# Patient Record
Sex: FEMALE | Race: WHITE | NOT HISPANIC OR LATINO | Employment: PART TIME | URBAN - METROPOLITAN AREA
[De-identification: names, ages, dates, MRNs, and addresses within clinical notes are randomized per-mention and may not be internally consistent; named-entity substitution may affect disease eponyms.]

---

## 2017-03-27 ENCOUNTER — TRANSFERRED RECORDS (OUTPATIENT)
Dept: HEALTH INFORMATION MANAGEMENT | Facility: CLINIC | Age: 27
End: 2017-03-27

## 2017-04-07 ENCOUNTER — TRANSFERRED RECORDS (OUTPATIENT)
Dept: HEALTH INFORMATION MANAGEMENT | Facility: CLINIC | Age: 27
End: 2017-04-07

## 2017-04-13 ENCOUNTER — TRANSFERRED RECORDS (OUTPATIENT)
Dept: HEALTH INFORMATION MANAGEMENT | Facility: CLINIC | Age: 27
End: 2017-04-13

## 2017-08-23 ENCOUNTER — TRANSFERRED RECORDS (OUTPATIENT)
Dept: HEALTH INFORMATION MANAGEMENT | Facility: CLINIC | Age: 27
End: 2017-08-23

## 2017-09-28 ENCOUNTER — RECORDS - HEALTHEAST (OUTPATIENT)
Dept: ADMINISTRATIVE | Facility: OTHER | Age: 27
End: 2017-09-28

## 2017-09-28 LAB

## 2018-01-09 ENCOUNTER — RECORDS - HEALTHEAST (OUTPATIENT)
Dept: LAB | Facility: CLINIC | Age: 28
End: 2018-01-09

## 2018-01-09 LAB — HIV 1+2 AB+HIV1 P24 AG SERPL QL IA: NEGATIVE

## 2018-01-10 LAB
C TRACH DNA SPEC QL PROBE+SIG AMP: NEGATIVE
N GONORRHOEA DNA SPEC QL NAA+PROBE: NEGATIVE
SYPHILIS RPR SCREEN - HISTORICAL: NORMAL

## 2018-02-07 ENCOUNTER — TRANSFERRED RECORDS (OUTPATIENT)
Dept: HEALTH INFORMATION MANAGEMENT | Facility: CLINIC | Age: 28
End: 2018-02-07

## 2019-04-24 ENCOUNTER — RECORDS - HEALTHEAST (OUTPATIENT)
Dept: LAB | Facility: HOSPITAL | Age: 29
End: 2019-04-24

## 2019-04-24 LAB
ALBUMIN SERPL-MCNC: 3.7 G/DL (ref 3.5–5)
ALP SERPL-CCNC: 61 U/L (ref 45–120)
ALT SERPL W P-5'-P-CCNC: 16 U/L (ref 0–45)
ANION GAP SERPL CALCULATED.3IONS-SCNC: 6 MMOL/L (ref 5–18)
AST SERPL W P-5'-P-CCNC: 15 U/L (ref 0–40)
BASOPHILS # BLD AUTO: 0 THOU/UL (ref 0–0.2)
BASOPHILS NFR BLD AUTO: 1 % (ref 0–2)
BILIRUB SERPL-MCNC: 0.8 MG/DL (ref 0–1)
BUN SERPL-MCNC: 10 MG/DL (ref 8–22)
CALCIUM SERPL-MCNC: 9.4 MG/DL (ref 8.5–10.5)
CHLORIDE BLD-SCNC: 105 MMOL/L (ref 98–107)
CO2 SERPL-SCNC: 29 MMOL/L (ref 22–31)
CREAT SERPL-MCNC: 0.71 MG/DL (ref 0.6–1.1)
EOSINOPHIL # BLD AUTO: 0.1 THOU/UL (ref 0–0.4)
EOSINOPHIL NFR BLD AUTO: 3 % (ref 0–6)
ERYTHROCYTE [DISTWIDTH] IN BLOOD BY AUTOMATED COUNT: 12.2 % (ref 11–14.5)
GFR SERPL CREATININE-BSD FRML MDRD: >60 ML/MIN/1.73M2
GLUCOSE BLD-MCNC: 84 MG/DL (ref 70–125)
HCT VFR BLD AUTO: 44.3 % (ref 35–47)
HGB BLD-MCNC: 14.9 G/DL (ref 12–16)
LYMPHOCYTES # BLD AUTO: 1.5 THOU/UL (ref 0.8–4.4)
LYMPHOCYTES NFR BLD AUTO: 41 % (ref 20–40)
MCH RBC QN AUTO: 31.7 PG (ref 27–34)
MCHC RBC AUTO-ENTMCNC: 33.6 G/DL (ref 32–36)
MCV RBC AUTO: 94 FL (ref 80–100)
MONOCYTES # BLD AUTO: 0.2 THOU/UL (ref 0–0.9)
MONOCYTES NFR BLD AUTO: 6 % (ref 2–10)
NEUTROPHILS # BLD AUTO: 1.8 THOU/UL (ref 2–7.7)
NEUTROPHILS NFR BLD AUTO: 50 % (ref 50–70)
PLATELET # BLD AUTO: 238 THOU/UL (ref 140–440)
PMV BLD AUTO: 11.9 FL (ref 8.5–12.5)
POTASSIUM BLD-SCNC: 4.7 MMOL/L (ref 3.5–5)
PROT SERPL-MCNC: 6.9 G/DL (ref 6–8)
RBC # BLD AUTO: 4.7 MILL/UL (ref 3.8–5.4)
SODIUM SERPL-SCNC: 140 MMOL/L (ref 136–145)
WBC: 3.7 THOU/UL (ref 4–11)

## 2019-04-25 LAB — 25(OH)D3 SERPL-MCNC: 57.7 NG/ML (ref 30–80)

## 2019-05-06 ENCOUNTER — TRANSFERRED RECORDS (OUTPATIENT)
Dept: HEALTH INFORMATION MANAGEMENT | Facility: CLINIC | Age: 29
End: 2019-05-06

## 2019-06-12 ENCOUNTER — TRANSFERRED RECORDS (OUTPATIENT)
Dept: HEALTH INFORMATION MANAGEMENT | Facility: CLINIC | Age: 29
End: 2019-06-12

## 2019-06-13 ENCOUNTER — TRANSFERRED RECORDS (OUTPATIENT)
Dept: HEALTH INFORMATION MANAGEMENT | Facility: CLINIC | Age: 29
End: 2019-06-13

## 2019-07-24 ENCOUNTER — RECORDS - HEALTHEAST (OUTPATIENT)
Dept: LAB | Facility: HOSPITAL | Age: 29
End: 2019-07-24

## 2019-07-24 ENCOUNTER — TRANSFERRED RECORDS (OUTPATIENT)
Dept: HEALTH INFORMATION MANAGEMENT | Facility: CLINIC | Age: 29
End: 2019-07-24

## 2019-07-24 LAB
ALBUMIN SERPL-MCNC: 3.9 G/DL (ref 3.5–5)
ALP SERPL-CCNC: 63 U/L (ref 45–120)
ALT SERPL W P-5'-P-CCNC: 13 U/L (ref 0–45)
ANION GAP SERPL CALCULATED.3IONS-SCNC: 6 MMOL/L (ref 5–18)
AST SERPL W P-5'-P-CCNC: 13 U/L (ref 0–40)
BASOPHILS # BLD AUTO: 0 THOU/UL (ref 0–0.2)
BASOPHILS NFR BLD AUTO: 0 % (ref 0–2)
BILIRUB SERPL-MCNC: 0.6 MG/DL (ref 0–1)
BUN SERPL-MCNC: 15 MG/DL (ref 8–22)
CALCIUM SERPL-MCNC: 9.3 MG/DL (ref 8.5–10.5)
CHLORIDE BLD-SCNC: 106 MMOL/L (ref 98–107)
CO2 SERPL-SCNC: 28 MMOL/L (ref 22–31)
CREAT SERPL-MCNC: 0.69 MG/DL (ref 0.6–1.1)
EOSINOPHIL # BLD AUTO: 0.1 THOU/UL (ref 0–0.4)
EOSINOPHIL NFR BLD AUTO: 2 % (ref 0–6)
ERYTHROCYTE [DISTWIDTH] IN BLOOD BY AUTOMATED COUNT: 12.7 % (ref 11–14.5)
GFR SERPL CREATININE-BSD FRML MDRD: >60 ML/MIN/1.73M2
GLUCOSE BLD-MCNC: 85 MG/DL (ref 70–125)
HCT VFR BLD AUTO: 42.8 % (ref 35–47)
HGB BLD-MCNC: 14.5 G/DL (ref 12–16)
LYMPHOCYTES # BLD AUTO: 1.4 THOU/UL (ref 0.8–4.4)
LYMPHOCYTES NFR BLD AUTO: 21 % (ref 20–40)
MCH RBC QN AUTO: 31.8 PG (ref 27–34)
MCHC RBC AUTO-ENTMCNC: 33.9 G/DL (ref 32–36)
MCV RBC AUTO: 94 FL (ref 80–100)
MONOCYTES # BLD AUTO: 0.4 THOU/UL (ref 0–0.9)
MONOCYTES NFR BLD AUTO: 6 % (ref 2–10)
NEUTROPHILS # BLD AUTO: 4.8 THOU/UL (ref 2–7.7)
NEUTROPHILS NFR BLD AUTO: 71 % (ref 50–70)
PLATELET # BLD AUTO: 225 THOU/UL (ref 140–440)
PMV BLD AUTO: 11.5 FL (ref 8.5–12.5)
POTASSIUM BLD-SCNC: 4.5 MMOL/L (ref 3.5–5)
PROT SERPL-MCNC: 6.7 G/DL (ref 6–8)
RBC # BLD AUTO: 4.56 MILL/UL (ref 3.8–5.4)
SODIUM SERPL-SCNC: 140 MMOL/L (ref 136–145)
WBC: 6.8 THOU/UL (ref 4–11)

## 2019-08-19 ENCOUNTER — RECORDS - HEALTHEAST (OUTPATIENT)
Dept: LAB | Facility: CLINIC | Age: 29
End: 2019-08-19

## 2019-08-19 LAB — HIV 1+2 AB+HIV1 P24 AG SERPL QL IA: NEGATIVE

## 2019-08-20 LAB
C TRACH DNA SPEC QL PROBE+SIG AMP: NEGATIVE
N GONORRHOEA DNA SPEC QL NAA+PROBE: NEGATIVE
T PALLIDUM AB SER QL: NEGATIVE

## 2019-12-24 ENCOUNTER — AMBULATORY - HEALTHEAST (OUTPATIENT)
Dept: OBGYN | Facility: CLINIC | Age: 29
End: 2019-12-24

## 2019-12-24 DIAGNOSIS — O98.311 GENITAL HERPES AFFECTING PREGNANCY IN FIRST TRIMESTER: ICD-10-CM

## 2019-12-24 DIAGNOSIS — A60.09 GENITAL HERPES AFFECTING PREGNANCY IN FIRST TRIMESTER: ICD-10-CM

## 2019-12-24 RX ORDER — VALACYCLOVIR HYDROCHLORIDE 1 G/1
1000 TABLET, FILM COATED ORAL DAILY
Qty: 5 TABLET | Refills: 10 | Status: SHIPPED | OUTPATIENT
Start: 2019-12-24 | End: 2024-06-26

## 2021-05-31 ENCOUNTER — RECORDS - HEALTHEAST (OUTPATIENT)
Dept: ADMINISTRATIVE | Facility: CLINIC | Age: 31
End: 2021-05-31

## 2022-09-20 ENCOUNTER — LAB REQUISITION (OUTPATIENT)
Dept: LAB | Facility: CLINIC | Age: 32
End: 2022-09-20

## 2022-09-20 DIAGNOSIS — Z12.4 ENCOUNTER FOR SCREENING FOR MALIGNANT NEOPLASM OF CERVIX: ICD-10-CM

## 2022-09-20 DIAGNOSIS — Z11.3 ENCOUNTER FOR SCREENING FOR INFECTIONS WITH A PREDOMINANTLY SEXUAL MODE OF TRANSMISSION: ICD-10-CM

## 2022-09-20 PROCEDURE — 86780 TREPONEMA PALLIDUM: CPT | Performed by: PHYSICIAN ASSISTANT

## 2022-09-20 PROCEDURE — 87389 HIV-1 AG W/HIV-1&-2 AB AG IA: CPT | Performed by: PHYSICIAN ASSISTANT

## 2022-09-20 PROCEDURE — 87624 HPV HI-RISK TYP POOLED RSLT: CPT | Performed by: PHYSICIAN ASSISTANT

## 2022-09-20 PROCEDURE — G0145 SCR C/V CYTO,THINLAYER,RESCR: HCPCS | Performed by: PHYSICIAN ASSISTANT

## 2022-09-21 LAB — T PALLIDUM AB SER QL: NONREACTIVE

## 2022-09-23 LAB
BKR LAB AP GYN ADEQUACY: NORMAL
BKR LAB AP GYN INTERPRETATION: NORMAL
BKR LAB AP HPV REFLEX: NORMAL
BKR LAB AP LMP: NORMAL
BKR LAB AP PREVIOUS ABNORMAL: NORMAL
HIV 1+2 AB+HIV1 P24 AG SERPL QL IA: NONREACTIVE
PATH REPORT.COMMENTS IMP SPEC: NORMAL
PATH REPORT.COMMENTS IMP SPEC: NORMAL
PATH REPORT.RELEVANT HX SPEC: NORMAL

## 2022-09-27 LAB
HUMAN PAPILLOMA VIRUS 16 DNA: NEGATIVE
HUMAN PAPILLOMA VIRUS 18 DNA: NEGATIVE
HUMAN PAPILLOMA VIRUS FINAL DIAGNOSIS: NORMAL
HUMAN PAPILLOMA VIRUS OTHER HR: NEGATIVE

## 2022-10-10 ENCOUNTER — LAB REQUISITION (OUTPATIENT)
Dept: LAB | Facility: CLINIC | Age: 32
End: 2022-10-10

## 2022-10-10 DIAGNOSIS — J02.9 ACUTE PHARYNGITIS, UNSPECIFIED: ICD-10-CM

## 2022-10-10 PROCEDURE — 87081 CULTURE SCREEN ONLY: CPT | Performed by: PHYSICIAN ASSISTANT

## 2022-10-12 LAB — BACTERIA SPEC CULT: NORMAL

## 2022-10-30 ENCOUNTER — ANESTHESIA EVENT (OUTPATIENT)
Dept: SURGERY | Facility: CLINIC | Age: 32
End: 2022-10-30
Payer: COMMERCIAL

## 2022-10-31 ENCOUNTER — ANESTHESIA (OUTPATIENT)
Dept: SURGERY | Facility: CLINIC | Age: 32
End: 2022-10-31
Payer: COMMERCIAL

## 2022-10-31 ENCOUNTER — HOSPITAL ENCOUNTER (OUTPATIENT)
Facility: CLINIC | Age: 32
Discharge: HOME | End: 2022-10-31
Attending: OBSTETRICS & GYNECOLOGY | Admitting: OBSTETRICS & GYNECOLOGY
Payer: COMMERCIAL

## 2022-10-31 VITALS
DIASTOLIC BLOOD PRESSURE: 78 MMHG | OXYGEN SATURATION: 96 % | SYSTOLIC BLOOD PRESSURE: 123 MMHG | TEMPERATURE: 100.2 F | HEART RATE: 93 BPM | BODY MASS INDEX: 22.23 KG/M2 | RESPIRATION RATE: 16 BRPM | HEIGHT: 69 IN | WEIGHT: 150.1 LBS

## 2022-10-31 DIAGNOSIS — Z90.710 S/P LAPAROSCOPIC HYSTERECTOMY: Primary | ICD-10-CM

## 2022-10-31 LAB — HCG SERPL QL: NEGATIVE

## 2022-10-31 PROCEDURE — 272N000001 HC OR GENERAL SUPPLY STERILE: Performed by: OBSTETRICS & GYNECOLOGY

## 2022-10-31 PROCEDURE — 360N000080 HC SURGERY LEVEL 7, PER MIN: Performed by: OBSTETRICS & GYNECOLOGY

## 2022-10-31 PROCEDURE — 88307 TISSUE EXAM BY PATHOLOGIST: CPT | Mod: TC | Performed by: OBSTETRICS & GYNECOLOGY

## 2022-10-31 PROCEDURE — 250N000011 HC RX IP 250 OP 636: Performed by: NURSE ANESTHETIST, CERTIFIED REGISTERED

## 2022-10-31 PROCEDURE — 250N000013 HC RX MED GY IP 250 OP 250 PS 637: Performed by: ANESTHESIOLOGY

## 2022-10-31 PROCEDURE — 258N000003 HC RX IP 258 OP 636: Performed by: OBSTETRICS & GYNECOLOGY

## 2022-10-31 PROCEDURE — 250N000025 HC SEVOFLURANE, PER MIN: Performed by: OBSTETRICS & GYNECOLOGY

## 2022-10-31 PROCEDURE — 250N000011 HC RX IP 250 OP 636: Performed by: OBSTETRICS & GYNECOLOGY

## 2022-10-31 PROCEDURE — 36415 COLL VENOUS BLD VENIPUNCTURE: CPT | Performed by: OBSTETRICS & GYNECOLOGY

## 2022-10-31 PROCEDURE — 250N000011 HC RX IP 250 OP 636: Performed by: ANESTHESIOLOGY

## 2022-10-31 PROCEDURE — 258N000003 HC RX IP 258 OP 636: Performed by: ANESTHESIOLOGY

## 2022-10-31 PROCEDURE — 710N000010 HC RECOVERY PHASE 1, LEVEL 2, PER MIN: Performed by: OBSTETRICS & GYNECOLOGY

## 2022-10-31 PROCEDURE — 710N000012 HC RECOVERY PHASE 2, PER MINUTE: Performed by: OBSTETRICS & GYNECOLOGY

## 2022-10-31 PROCEDURE — 88307 TISSUE EXAM BY PATHOLOGIST: CPT | Mod: 26 | Performed by: PATHOLOGY

## 2022-10-31 PROCEDURE — 250N000009 HC RX 250: Performed by: OBSTETRICS & GYNECOLOGY

## 2022-10-31 PROCEDURE — 250N000009 HC RX 250: Performed by: NURSE ANESTHETIST, CERTIFIED REGISTERED

## 2022-10-31 PROCEDURE — 999N000141 HC STATISTIC PRE-PROCEDURE NURSING ASSESSMENT: Performed by: OBSTETRICS & GYNECOLOGY

## 2022-10-31 PROCEDURE — 84703 CHORIONIC GONADOTROPIN ASSAY: CPT | Performed by: OBSTETRICS & GYNECOLOGY

## 2022-10-31 PROCEDURE — 370N000017 HC ANESTHESIA TECHNICAL FEE, PER MIN: Performed by: OBSTETRICS & GYNECOLOGY

## 2022-10-31 RX ORDER — KETOROLAC TROMETHAMINE 30 MG/ML
15 INJECTION, SOLUTION INTRAMUSCULAR; INTRAVENOUS EVERY 6 HOURS PRN
Status: DISCONTINUED | OUTPATIENT
Start: 2022-10-31 | End: 2022-10-31 | Stop reason: HOSPADM

## 2022-10-31 RX ORDER — SODIUM CHLORIDE, SODIUM LACTATE, POTASSIUM CHLORIDE, CALCIUM CHLORIDE 600; 310; 30; 20 MG/100ML; MG/100ML; MG/100ML; MG/100ML
INJECTION, SOLUTION INTRAVENOUS CONTINUOUS
Status: DISCONTINUED | OUTPATIENT
Start: 2022-10-31 | End: 2022-10-31 | Stop reason: HOSPADM

## 2022-10-31 RX ORDER — GLYCOPYRROLATE 0.2 MG/ML
INJECTION, SOLUTION INTRAMUSCULAR; INTRAVENOUS PRN
Status: DISCONTINUED | OUTPATIENT
Start: 2022-10-31 | End: 2022-10-31

## 2022-10-31 RX ORDER — ONDANSETRON 4 MG/1
4 TABLET, ORALLY DISINTEGRATING ORAL EVERY 30 MIN PRN
Status: DISCONTINUED | OUTPATIENT
Start: 2022-10-31 | End: 2022-10-31 | Stop reason: HOSPADM

## 2022-10-31 RX ORDER — BUPIVACAINE HYDROCHLORIDE 2.5 MG/ML
INJECTION, SOLUTION INFILTRATION; PERINEURAL PRN
Status: DISCONTINUED | OUTPATIENT
Start: 2022-10-31 | End: 2022-10-31 | Stop reason: HOSPADM

## 2022-10-31 RX ORDER — MEPERIDINE HYDROCHLORIDE 25 MG/ML
12.5 INJECTION INTRAMUSCULAR; INTRAVENOUS; SUBCUTANEOUS
Status: DISCONTINUED | OUTPATIENT
Start: 2022-10-31 | End: 2022-10-31 | Stop reason: HOSPADM

## 2022-10-31 RX ORDER — VENLAFAXINE HYDROCHLORIDE 75 MG/1
75 CAPSULE, EXTENDED RELEASE ORAL DAILY
COMMUNITY
Start: 2022-10-01 | End: 2024-02-20

## 2022-10-31 RX ORDER — ONDANSETRON 4 MG/1
4 TABLET, ORALLY DISINTEGRATING ORAL EVERY 8 HOURS PRN
Qty: 4 TABLET | Refills: 0 | Status: SHIPPED | OUTPATIENT
Start: 2022-10-31 | End: 2023-08-09

## 2022-10-31 RX ORDER — ONDANSETRON 2 MG/ML
4 INJECTION INTRAMUSCULAR; INTRAVENOUS EVERY 30 MIN PRN
Status: DISCONTINUED | OUTPATIENT
Start: 2022-10-31 | End: 2022-10-31 | Stop reason: HOSPADM

## 2022-10-31 RX ORDER — OXYCODONE HYDROCHLORIDE 5 MG/1
5-10 TABLET ORAL EVERY 4 HOURS PRN
Qty: 12 TABLET | Refills: 0 | Status: SHIPPED | OUTPATIENT
Start: 2022-10-31 | End: 2023-08-09

## 2022-10-31 RX ORDER — LIDOCAINE 40 MG/G
CREAM TOPICAL
Status: DISCONTINUED | OUTPATIENT
Start: 2022-10-31 | End: 2022-10-31 | Stop reason: HOSPADM

## 2022-10-31 RX ORDER — FENTANYL CITRATE 50 UG/ML
25 INJECTION, SOLUTION INTRAMUSCULAR; INTRAVENOUS
Status: DISCONTINUED | OUTPATIENT
Start: 2022-10-31 | End: 2022-10-31 | Stop reason: HOSPADM

## 2022-10-31 RX ORDER — HYDROMORPHONE HCL IN WATER/PF 6 MG/30 ML
0.2 PATIENT CONTROLLED ANALGESIA SYRINGE INTRAVENOUS EVERY 5 MIN PRN
Status: DISCONTINUED | OUTPATIENT
Start: 2022-10-31 | End: 2022-10-31 | Stop reason: HOSPADM

## 2022-10-31 RX ORDER — PROPOFOL 10 MG/ML
INJECTION, EMULSION INTRAVENOUS PRN
Status: DISCONTINUED | OUTPATIENT
Start: 2022-10-31 | End: 2022-10-31

## 2022-10-31 RX ORDER — SODIUM CHLORIDE, SODIUM LACTATE, POTASSIUM CHLORIDE, AND CALCIUM CHLORIDE .6; .31; .03; .02 G/100ML; G/100ML; G/100ML; G/100ML
IRRIGANT IRRIGATION PRN
Status: DISCONTINUED | OUTPATIENT
Start: 2022-10-31 | End: 2022-10-31 | Stop reason: HOSPADM

## 2022-10-31 RX ORDER — CEFAZOLIN SODIUM/WATER 2 G/20 ML
SYRINGE (ML) INTRAVENOUS PRN
Status: DISCONTINUED | OUTPATIENT
Start: 2022-10-31 | End: 2022-10-31

## 2022-10-31 RX ORDER — FENTANYL CITRATE 50 UG/ML
25 INJECTION, SOLUTION INTRAMUSCULAR; INTRAVENOUS EVERY 5 MIN PRN
Status: DISCONTINUED | OUTPATIENT
Start: 2022-10-31 | End: 2022-10-31 | Stop reason: HOSPADM

## 2022-10-31 RX ORDER — OXYCODONE HYDROCHLORIDE 5 MG/1
5 TABLET ORAL
Status: DISCONTINUED | OUTPATIENT
Start: 2022-10-31 | End: 2022-10-31 | Stop reason: HOSPADM

## 2022-10-31 RX ORDER — FENTANYL CITRATE 50 UG/ML
INJECTION, SOLUTION INTRAMUSCULAR; INTRAVENOUS PRN
Status: DISCONTINUED | OUTPATIENT
Start: 2022-10-31 | End: 2022-10-31

## 2022-10-31 RX ORDER — AMOXICILLIN 250 MG
1-2 CAPSULE ORAL 2 TIMES DAILY
Qty: 30 TABLET | Refills: 0 | Status: SHIPPED | OUTPATIENT
Start: 2022-10-31 | End: 2023-08-09

## 2022-10-31 RX ORDER — DEXAMETHASONE SODIUM PHOSPHATE 4 MG/ML
INJECTION, SOLUTION INTRA-ARTICULAR; INTRALESIONAL; INTRAMUSCULAR; INTRAVENOUS; SOFT TISSUE PRN
Status: DISCONTINUED | OUTPATIENT
Start: 2022-10-31 | End: 2022-10-31

## 2022-10-31 RX ORDER — ONDANSETRON 2 MG/ML
INJECTION INTRAMUSCULAR; INTRAVENOUS PRN
Status: DISCONTINUED | OUTPATIENT
Start: 2022-10-31 | End: 2022-10-31

## 2022-10-31 RX ORDER — OXYCODONE HYDROCHLORIDE 5 MG/1
5 TABLET ORAL EVERY 4 HOURS PRN
Status: DISCONTINUED | OUTPATIENT
Start: 2022-10-31 | End: 2022-10-31 | Stop reason: HOSPADM

## 2022-10-31 RX ADMIN — OXYCODONE HYDROCHLORIDE 5 MG: 5 TABLET ORAL at 10:46

## 2022-10-31 RX ADMIN — GLYCOPYRROLATE 0.2 MG: 0.2 INJECTION, SOLUTION INTRAMUSCULAR; INTRAVENOUS at 07:46

## 2022-10-31 RX ADMIN — HYDROMORPHONE HYDROCHLORIDE 0.5 MG: 1 INJECTION, SOLUTION INTRAMUSCULAR; INTRAVENOUS; SUBCUTANEOUS at 08:15

## 2022-10-31 RX ADMIN — MIDAZOLAM 2 MG: 1 INJECTION INTRAMUSCULAR; INTRAVENOUS at 07:41

## 2022-10-31 RX ADMIN — PROPOFOL 150 MG: 10 INJECTION, EMULSION INTRAVENOUS at 07:49

## 2022-10-31 RX ADMIN — SODIUM CHLORIDE, POTASSIUM CHLORIDE, SODIUM LACTATE AND CALCIUM CHLORIDE: 600; 310; 30; 20 INJECTION, SOLUTION INTRAVENOUS at 08:25

## 2022-10-31 RX ADMIN — FENTANYL CITRATE 25 MCG: 50 INJECTION, SOLUTION INTRAMUSCULAR; INTRAVENOUS at 09:45

## 2022-10-31 RX ADMIN — SODIUM CHLORIDE, POTASSIUM CHLORIDE, SODIUM LACTATE AND CALCIUM CHLORIDE: 600; 310; 30; 20 INJECTION, SOLUTION INTRAVENOUS at 07:08

## 2022-10-31 RX ADMIN — Medication 2 G: at 07:54

## 2022-10-31 RX ADMIN — SODIUM CHLORIDE, POTASSIUM CHLORIDE, SODIUM LACTATE AND CALCIUM CHLORIDE: 600; 310; 30; 20 INJECTION, SOLUTION INTRAVENOUS at 06:30

## 2022-10-31 RX ADMIN — SUGAMMADEX 100 MG: 100 INJECTION, SOLUTION INTRAVENOUS at 08:59

## 2022-10-31 RX ADMIN — ROCURONIUM BROMIDE 30 MG: 10 INJECTION, SOLUTION INTRAVENOUS at 07:49

## 2022-10-31 RX ADMIN — ONDANSETRON 4 MG: 2 INJECTION INTRAMUSCULAR; INTRAVENOUS at 07:49

## 2022-10-31 RX ADMIN — HYDROMORPHONE HYDROCHLORIDE 0.2 MG: 0.2 INJECTION, SOLUTION INTRAMUSCULAR; INTRAVENOUS; SUBCUTANEOUS at 10:05

## 2022-10-31 RX ADMIN — FENTANYL CITRATE 50 MCG: 50 INJECTION, SOLUTION INTRAMUSCULAR; INTRAVENOUS at 08:18

## 2022-10-31 RX ADMIN — ONDANSETRON 4 MG: 2 INJECTION INTRAMUSCULAR; INTRAVENOUS at 10:44

## 2022-10-31 RX ADMIN — DEXAMETHASONE SODIUM PHOSPHATE 10 MG: 4 INJECTION, SOLUTION INTRA-ARTICULAR; INTRALESIONAL; INTRAMUSCULAR; INTRAVENOUS; SOFT TISSUE at 07:58

## 2022-10-31 RX ADMIN — FENTANYL CITRATE 25 MCG: 50 INJECTION, SOLUTION INTRAMUSCULAR; INTRAVENOUS at 09:40

## 2022-10-31 RX ADMIN — KETOROLAC TROMETHAMINE 15 MG: 30 INJECTION, SOLUTION INTRAMUSCULAR; INTRAVENOUS at 09:57

## 2022-10-31 RX ADMIN — FENTANYL CITRATE 25 MCG: 50 INJECTION, SOLUTION INTRAMUSCULAR; INTRAVENOUS at 09:55

## 2022-10-31 RX ADMIN — FENTANYL CITRATE 100 MCG: 50 INJECTION, SOLUTION INTRAMUSCULAR; INTRAVENOUS at 07:49

## 2022-10-31 RX ADMIN — HYDROMORPHONE HYDROCHLORIDE 0.2 MG: 0.2 INJECTION, SOLUTION INTRAMUSCULAR; INTRAVENOUS; SUBCUTANEOUS at 10:12

## 2022-10-31 RX ADMIN — SUGAMMADEX 100 MG: 100 INJECTION, SOLUTION INTRAVENOUS at 09:08

## 2022-10-31 ASSESSMENT — ACTIVITIES OF DAILY LIVING (ADL)
ADLS_ACUITY_SCORE: 35

## 2022-10-31 NOTE — ANESTHESIA POSTPROCEDURE EVALUATION
Patient: Mary Joshua    Procedure: Procedure(s):  ROBOTIC TOTAL LAPAROSCOPIC HYSTERECTOMY, BILATERAL SALPINGECTOMY, CYSTOSCOPY       Anesthesia Type:  General    Note:  Disposition: Inpatient   Postop Pain Control: Uneventful            Sign Out: Well controlled pain   PONV: No   Neuro/Psych: Uneventful            Sign Out: Acceptable/Baseline neuro status   Airway/Respiratory: Uneventful            Sign Out: Acceptable/Baseline resp. status   CV/Hemodynamics: Uneventful            Sign Out: Acceptable CV status; No obvious hypovolemia; No obvious fluid overload   Other NRE: NONE   DID A NON-ROUTINE EVENT OCCUR? No           Last vitals:  Vitals Value Taken Time   /76 10/31/22 0940   Temp 36.7  C (98  F) 10/31/22 0916   Pulse 86 10/31/22 0947   Resp 14 10/31/22 0947   SpO2 95 % 10/31/22 0947   Vitals shown include unvalidated device data.    Electronically Signed By: Roshan Skinner MD  October 31, 2022  9:49 AM

## 2022-10-31 NOTE — ANESTHESIA CARE TRANSFER NOTE
Patient: Mary Joshua    Procedure: Procedure(s):  ROBOTIC TOTAL LAPAROSCOPIC HYSTERECTOMY, BILATERAL SALPINGECTOMY, CYSTOSCOPY       Diagnosis: Excessive and frequent menstruation with regular cycle [N92.0]  Diagnosis Additional Information: No value filed.    Anesthesia Type:   General     Note:    Oropharynx: oropharynx clear of all foreign objects  Level of Consciousness: awake  Oxygen Supplementation: face mask  Level of Supplemental Oxygen (L/min / FiO2): 5  Independent Airway: airway patency satisfactory and stable  Dentition: dentition unchanged  Vital Signs Stable: post-procedure vital signs reviewed and stable  Report to RN Given: handoff report given  Patient transferred to: PACU    Handoff Report: Identifed the Patient, Identified the Reponsible Provider, Reviewed the pertinent medical history, Discussed the surgical course, Reviewed Intra-OP anesthesia mangement and issues during anesthesia, Set expectations for post-procedure period and Allowed opportunity for questions and acknowledgement of understanding      Vitals:  Vitals Value Taken Time   /80 10/31/22 0917   Temp 36.7  C (98  F) 10/31/22 0916   Pulse 105 10/31/22 0917   Resp 17 10/31/22 0917   SpO2 100 % 10/31/22 0917   Vitals shown include unvalidated device data.    Electronically Signed By: KEVIN Alanis CRNA  October 31, 2022  9:19 AM

## 2022-10-31 NOTE — ANESTHESIA POSTPROCEDURE EVALUATION
Patient: Mary Joshua    Procedure: Procedure(s):  ROBOTIC TOTAL LAPAROSCOPIC HYSTERECTOMY, BILATERAL SALPINGECTOMY, CYSTOSCOPY       Anesthesia Type:  General    Note:  Disposition: Outpatient   Postop Pain Control: Uneventful            Sign Out: Well controlled pain   PONV: No   Neuro/Psych: Uneventful            Sign Out: Acceptable/Baseline neuro status   Airway/Respiratory: Uneventful            Sign Out: Acceptable/Baseline resp. status   CV/Hemodynamics: Uneventful            Sign Out: Acceptable CV status; No obvious hypovolemia; No obvious fluid overload   Other NRE: NONE   DID A NON-ROUTINE EVENT OCCUR? No           Last vitals:  Vitals Value Taken Time   /76 10/31/22 0940   Temp 36.7  C (98  F) 10/31/22 0916   Pulse 86 10/31/22 0948   Resp 13 10/31/22 0948   SpO2 95 % 10/31/22 0948   Vitals shown include unvalidated device data.    Electronically Signed By: Roshan Skinner MD  October 31, 2022  9:50 AM

## 2022-10-31 NOTE — ANESTHESIA PREPROCEDURE EVALUATION
Anesthesia Pre-Procedure Evaluation    Patient: Mary Joshua   MRN: [id number] : 1990        Procedure : Procedure(s):  ROBOTIC TOTAL LAPAROSCOPIC HYSTERECTOMY, BILATERAL SALPINGECTOMY,  POSSIBLE LEFT OVARIAN CYSTECTOMY          Past Medical History:   Diagnosis Date     Multiple sclerosis (H)       Past Surgical History:   Procedure Laterality Date     C/SECTION, LOW TRANSVERSE  2016    breech / dr kassie caruso / [facility]       SECTION       DILATION AND CURETTAGE       OTHER SURGICAL HISTORY      uterine septum revision      No Known Allergies   Social History     Tobacco Use     Smoking status: Never     Smokeless tobacco: Not on file   Substance Use Topics     Alcohol use: No      Wt Readings from Last 1 Encounters:   16 79.4 kg (175 lb)        Anesthesia Evaluation   Pt has had prior anesthetic.     No history of anesthetic complications       ROS/MED HX  ENT/Pulmonary:  - neg pulmonary ROS     Neurologic:     (+) Multiple Sclerosis,     Cardiovascular:  - neg cardiovascular ROS     METS/Exercise Tolerance:     Hematologic:  - neg hematologic  ROS     Musculoskeletal:  - neg musculoskeletal ROS     GI/Hepatic:  - neg GI/hepatic ROS     Renal/Genitourinary:  - neg Renal ROS     Endo:  - neg endo ROS     Psychiatric/Substance Use:  - neg psychiatric ROS     Infectious Disease:  - neg infectious disease ROS     Malignancy:  - neg malignancy ROS     Other:            Physical Exam    Airway  airway exam normal      Mallampati: I       Respiratory Devices and Support         Dental           Cardiovascular   cardiovascular exam normal       Rhythm and rate: regular and normal     Pulmonary   pulmonary exam normal        breath sounds clear to auscultation           OUTSIDE LABS:  CBC:   Lab Results   Component Value Date    WBC 6.8 2019    WBC 3.7 (L) 2019    HGB 14.5 2019    HGB 14.9 2019    HCT 42.8 2019    HCT 44.3 2019      07/24/2019     04/24/2019     BMP:   Lab Results   Component Value Date     07/24/2019     04/24/2019    POTASSIUM 4.5 07/24/2019    POTASSIUM 4.7 04/24/2019    CHLORIDE 106 07/24/2019    CHLORIDE 105 04/24/2019    CO2 28 07/24/2019    CO2 29 04/24/2019    BUN 15 07/24/2019    BUN 10 04/24/2019    CR 0.69 07/24/2019    CR 0.71 04/24/2019    GLC 85 07/24/2019    GLC 84 04/24/2019     COAGS: No results found for: PTT, INR, FIBR  POC: No results found for: BGM, HCG, HCGS  HEPATIC:   Lab Results   Component Value Date    ALBUMIN 3.9 07/24/2019    PROTTOTAL 6.7 07/24/2019    ALT 13 07/24/2019    AST 13 07/24/2019    ALKPHOS 63 07/24/2019    BILITOTAL 0.6 07/24/2019     OTHER:   Lab Results   Component Value Date    MICKEY 9.3 07/24/2019       Anesthesia Plan    ASA Status:  2      Anesthesia Type: General.     - Airway: ETT   Induction: Intravenous.   Maintenance: Balanced.        Consents    Anesthesia Plan(s) and associated risks, benefits, and realistic alternatives discussed. Questions answered and patient/representative(s) expressed understanding.    - Discussed:     - Discussed with:  Patient, Spouse         Postoperative Care    Pain management: IV analgesics.   PONV prophylaxis: Ondansetron (or other 5HT-3), Dexamethasone or Solumedrol     Comments:                Roshan Skinner MD

## 2022-10-31 NOTE — ANESTHESIA PROCEDURE NOTES
Airway         Procedure Start/Stop Times: 10/31/2022 7:52 AM  Staff -        CRNA: Santhosh Terry APRN CRNA       Performed By: CRNA  Consent for Airway        Urgency: elective  Indications and Patient Condition       Indications for airway management: astrid-procedural       Induction type:intravenous       Mask difficulty assessment: 1 - vent by mask    Final Airway Details       Final airway type: endotracheal airway       Successful airway: ETT - single  Endotracheal Airway Details        ETT size (mm): 7.0       Cuffed: yes       Successful intubation technique: direct laryngoscopy       DL Blade Type: Alfredo 2       Grade View of Cords: 1       Adjucts: stylet       Position: Right       Measured from: lips       Secured at (cm): 23       Bite block used: None    Post intubation assessment        Placement verified by: capnometry, equal breath sounds and chest rise        Number of attempts at approach: 1       Number of other approaches attempted: 0       Secured with: silk tape       Ease of procedure: easy       Dentition: Intact       Dental guard used and removed. Dental Guard Type: Proguard Red.    Medication(s) Administered   Medication Administration Time: 10/31/2022 7:52 AM

## 2022-10-31 NOTE — OP NOTE
Name:  Mary Joshua  Record # [id number]   : 1990  Care Provider: Tala Moreno DO   Admit Date:  10/31/2022       Obstetrics Gynecology - Operative Report    DATE OF SERVICE: 10/31/2022     PREOPERATIVE DIAGNOSIS: menorrhagia; left ovarian cyst  POSTOPERATIVE DIAGNOSIS: same; left ovarian cyst - resolved.    PROCEDURE:   Da Susie total laparoscopic assisted hysterectomy, bilateral salpingectomy and cystoscopy    FINDINGS:  Normal findings were noted. Via cystoscopy normal bladder with efflux of urine from both ureters at the completion of the procedure.    PHYSICIAN:  Tala Moreno DO     ASSISTANT:   Kristin Srivastava    ESTIMATED BLOOD LOSS: 50 ml    ANESTHESIA:  General.    SPECIMENS REMOVED:  Uterus, cervix and bilateral tubes.    COMPLICATIONS:  None noted.    COMMENTS: Mary Joshua was met preoperatively with her  where we discussed the procedure and the risks associated with the procedure.  She understood these to be, but not limited to injury to adjacent organs including bladder, bowel, ureter, infection and bleeding.  Patient signed consent and was brought back to the operating room in stable condition.    Patient underwent induction of a general anesthetic, she was carefully prepped and draped for the procedure in sterile fashion. Timeout was performed. Bladder was drained with a Woodall catheter, uterine manipulator placed into the uterus.     Attention was turned to the abdomen.  An incision was made above the umbilicus.  A Veress needle was introduced with a 2 pop technique, saline drop test confirmed adequate placement. Opening pressure was 5mmHg.   Insufflation was now done until 15mm of pressure were established.  An 11/12 nonbladed trocar was placed above the umbilicus. Two robotic assist ports were place approximately 8 cm lateral to this initial incision.  A right upper quadrant 8 nonbladed trocar was placed and a right lateral quadrant 5 mm trocar was placed. [facility]  assistance was used and the davinci was then brought to the patient s bedside.  The da Susie was then docked.  The PK bipolar forceps were placed at the left da Susie port, the Vessel Sealer scissors were placed in right side of the body and I took my turn to the da Susie console.     The procedure began with identifying the normal anatomy.  The uterus appeared normal in contour and was mobile.  The tube on the left side was cauterized, transected and removed.  The ovarian ligament on the left side was then cauterized and transected.  The round ligament on the left side was cauterized and transected creating an anterior and posterior leave of the broad ligament. This was carried out in similar fashion on the right side.  The anterior bladder flap was created.  The ureter was then identified and its course where it dives under the uterine vasculature.  This was repeated on the right side.  At this junction, the uterine vasculature on both sides near the uterocervical isthmus were cauterized and transected.  This cauterization and transection was continued along the cervix until the level of the cardinal ligament.  At this junction anterior colpotomy and posterior colpotomy were performed. The uterus, cervix and tubes were then delivered through the vagina.  The vaginal cuff was closed with 0 V-lock suture.  Both angles were elevated to its ipsilateral uterosacral ligament.    At this junction, the procedure  was complete.  Sponge, lap and needle counts were correct x 2.  I took my turn to cystoscopy, noting normal ureter and bladder anatomy. Strong urine efflux bilaterally was noted from bilateral ureteral orfices.    The trocars were removed and the gas was removed from the abdomen.  The fascia was closed with umbilical and upper right quadrant port with 0 Vicryl.  Skin was closed with 4-0 Monocryl. Steri-Strips applied.  She was brought to the recovery in stable condition.      Tala Moreno DO     Cc:

## 2022-11-01 LAB
PATH REPORT.COMMENTS IMP SPEC: NORMAL
PATH REPORT.FINAL DX SPEC: NORMAL
PATH REPORT.GROSS SPEC: NORMAL
PATH REPORT.RELEVANT HX SPEC: NORMAL
PHOTO IMAGE: NORMAL

## 2022-11-30 NOTE — TELEPHONE ENCOUNTER
"Action 11/30/22 MV 10.22am   Action Taken 1) records request faxed to  for old [facility] records  2) imaging request faxed to [facility]    12/5/22 Mv 2.41pm  1) Recs rec'd and sent to scanning ; [facility] images resolved in PACS  2) imaging request faxed to [facility]   3) records and imaging [facility]    12/6/22 V 1.59pm  [facility] images resolved in PACS ; reports sent to scanning - waiting for [facility] records and imaging    12/8/22 MV 9.21am  Records rec'd from [facility] and sent to scanning ; still need images    12/20/22 MV 2.14pm  Called [facility] and was able to find the images in PACS under \"MARYAM MATOS KATELYN\". Images resolved         RECORDS RECEIVED FROM: self   REASON FOR VISIT: MS   Date of Appt: 1/11/23   NOTES (FOR ALL VISITS) STATUS DETAILS   OFFICE NOTE from other specialist Received Dr Hector Yo @ [facility]:  7/24/19  4/24/19  10/3/16  (additional)    Dr Carie Gould @ [facility]:  8/23/17  3/27/17   MEDICATION LIST Internal    IMAGING  (FOR ALL VISITS)     MRI Received [facility]:  MRI Head 6/12/19  MRI Cervical 6/12/19  Thoracic Spine 6/12/19    [facility]:  MRI Cervical Spine 2/7/18  MRI Head 4/7/17  MRI Cervical Spine 4/7/17   CT (HEAD, NECK, SPINE) Received [facility]:  CT Cervical Spine 1/1/18  CT Head 1/1/18        "

## 2023-01-11 ENCOUNTER — PRE VISIT (OUTPATIENT)
Dept: NEUROLOGY | Facility: CLINIC | Age: 33
End: 2023-01-11

## 2023-01-13 ENCOUNTER — OFFICE VISIT (OUTPATIENT)
Dept: NEUROLOGY | Facility: CLINIC | Age: 33
End: 2023-01-13
Attending: PSYCHIATRY & NEUROLOGY
Payer: COMMERCIAL

## 2023-01-13 VITALS
WEIGHT: 156.4 LBS | OXYGEN SATURATION: 97 % | HEART RATE: 94 BPM | DIASTOLIC BLOOD PRESSURE: 86 MMHG | BODY MASS INDEX: 23.1 KG/M2 | SYSTOLIC BLOOD PRESSURE: 135 MMHG

## 2023-01-13 DIAGNOSIS — G35 MS (MULTIPLE SCLEROSIS) (H): Primary | ICD-10-CM

## 2023-01-13 DIAGNOSIS — R53.82 CHRONIC FATIGUE: ICD-10-CM

## 2023-01-13 DIAGNOSIS — N31.9 NEUROGENIC BLADDER: ICD-10-CM

## 2023-01-13 DIAGNOSIS — Z51.81 THERAPEUTIC DRUG MONITORING: ICD-10-CM

## 2023-01-13 PROCEDURE — 99205 OFFICE O/P NEW HI 60 MIN: CPT | Performed by: PSYCHIATRY & NEUROLOGY

## 2023-01-13 PROCEDURE — G0463 HOSPITAL OUTPT CLINIC VISIT: HCPCS

## 2023-01-13 PROCEDURE — G0463 HOSPITAL OUTPT CLINIC VISIT: HCPCS | Performed by: PSYCHIATRY & NEUROLOGY

## 2023-01-13 RX ORDER — DIPHENHYDRAMINE HYDROCHLORIDE 50 MG/ML
50 INJECTION INTRAMUSCULAR; INTRAVENOUS
Status: CANCELLED
Start: 2023-05-01

## 2023-01-13 RX ORDER — METHYLPREDNISOLONE SODIUM SUCCINATE 125 MG/2ML
125 INJECTION, POWDER, LYOPHILIZED, FOR SOLUTION INTRAMUSCULAR; INTRAVENOUS ONCE
Status: CANCELLED | OUTPATIENT
Start: 2023-05-01

## 2023-01-13 RX ORDER — ALBUTEROL SULFATE 0.83 MG/ML
2.5 SOLUTION RESPIRATORY (INHALATION)
Status: CANCELLED | OUTPATIENT
Start: 2023-05-01

## 2023-01-13 RX ORDER — HEPARIN SODIUM (PORCINE) LOCK FLUSH IV SOLN 100 UNIT/ML 100 UNIT/ML
5 SOLUTION INTRAVENOUS
Status: CANCELLED | OUTPATIENT
Start: 2023-05-01

## 2023-01-13 RX ORDER — METHYLPREDNISOLONE SODIUM SUCCINATE 125 MG/2ML
125 INJECTION, POWDER, LYOPHILIZED, FOR SOLUTION INTRAMUSCULAR; INTRAVENOUS
Status: CANCELLED
Start: 2023-05-01

## 2023-01-13 RX ORDER — MEPERIDINE HYDROCHLORIDE 25 MG/ML
25 INJECTION INTRAMUSCULAR; INTRAVENOUS; SUBCUTANEOUS EVERY 30 MIN PRN
Status: CANCELLED | OUTPATIENT
Start: 2023-05-01

## 2023-01-13 RX ORDER — ALBUTEROL SULFATE 90 UG/1
1-2 AEROSOL, METERED RESPIRATORY (INHALATION)
Status: CANCELLED
Start: 2023-05-01

## 2023-01-13 RX ORDER — ACETAMINOPHEN 325 MG/1
650 TABLET ORAL ONCE
Status: CANCELLED | OUTPATIENT
Start: 2023-05-01

## 2023-01-13 RX ORDER — MODAFINIL 100 MG/1
100-200 TABLET ORAL DAILY PRN
Qty: 60 TABLET | Refills: 5 | Status: SHIPPED | OUTPATIENT
Start: 2023-01-13 | End: 2023-08-09

## 2023-01-13 RX ORDER — EPINEPHRINE 1 MG/ML
0.3 INJECTION, SOLUTION, CONCENTRATE INTRAVENOUS EVERY 5 MIN PRN
Status: CANCELLED | OUTPATIENT
Start: 2023-05-01

## 2023-01-13 RX ORDER — DIPHENHYDRAMINE HCL 25 MG
50 CAPSULE ORAL ONCE
Status: CANCELLED | OUTPATIENT
Start: 2023-05-01

## 2023-01-13 RX ORDER — HEPARIN SODIUM,PORCINE 10 UNIT/ML
5 VIAL (ML) INTRAVENOUS
Status: CANCELLED | OUTPATIENT
Start: 2023-05-01

## 2023-01-13 RX ORDER — OXYBUTYNIN CHLORIDE 5 MG/1
5 TABLET ORAL 2 TIMES DAILY
Qty: 60 TABLET | Refills: 4 | Status: SHIPPED | OUTPATIENT
Start: 2023-01-13 | End: 2023-06-05

## 2023-01-13 ASSESSMENT — PAIN SCALES - GENERAL: PAINLEVEL: NO PAIN (0)

## 2023-01-13 NOTE — PROGRESS NOTES
Date of Service: 1/13/2023    [facility] Neurology   MS Clinic Evaluation    Subjective: 31 y/o woman who presents for evaluation of MS     Disease onset at age 17 when she experienced numbness and tingling in her hands and feet and a right ON.     She has been on a number of disease modifying therapies since diagnosis.  Most recently she was placed on ocrevus and is tolerating this well.     She notes that whenever she gets a fever she is not able to walk. She routinely experiences uhthoff's phenomenon.      Right foot drop sets in after less than a mile.   When writing her right hand will fatigue.     She exercises routinely.  Bikes x 1 hour.      She struggles with urinary urgency and occasional hesitation. No UTIs.     She struggles with fatigue that will hit her around 1PM.  Methylphenidate 5 mg bid worked well for her in the past, but she is open to other options. She doesn't like having the controlled substance in her home with young children.     She is taking vitamin D3 5000 international unit(s) daily.     Disease onset: age 17, cervical sensory myelitis, R ON   LR uncertain     DMD hx:   Avonex 2008, brief, suffered severe flu side effects  betaseron 2008 x several months, radiologic progression   Copaxone several years, interrupted for pregnancy  Copaxone 40 mg - developed hives  Gilenya 2016 - discontinued for pregnancy  Copaxone 20 mg consistently [id number], radiologic progression   tecfidera 2019   ocrevus LD 11/2022, tolerating       No Known Allergies    Current Outpatient Medications   Medication     ocrelizumab 600 mg     ondansetron (ZOFRAN ODT) 4 MG ODT tab     PNV62/FA/OM3/DHA/EPA/FISH OIL (PRENATAL GUMMY ORAL)     valACYclovir (VALTREX) 1000 MG tablet     venlafaxine (EFFEXOR XR) 75 MG 24 hr capsule     oxyCODONE (ROXICODONE) 5 MG tablet     senna-docusate (SENOKOT-S/PERICOLACE) 8.6-50 MG tablet     No current facility-administered medications for this visit.        Past medical, surgical,  social and family history was personally reviewed. Pertinent details noted above.     Physical Examination:   /86 (BP Location: Right arm, Patient Position: Sitting, Cuff Size: Adult Small)   Pulse 94   Wt 70.9 kg (156 lb 6.4 oz)   SpO2 97%   BMI 23.10 kg/m      General: no acute distress  Cranial nerves:   VFFC  PERRL w/no RAPD  EOM full w/no LUIS CARLOS   Face symmetric  Hearing intact  No dysarthria   Motor:   Tone is mildly increased in the lower extremities  Bulk is normal     R L  Deltoid  5 5  Biceps  5 5  Triceps 5 5  Wrist ext 5 5  Finger ext 5 5  Finger abd 5 5    Hip flexion 4+ 4+  Knee flexion 4+ 5  Knee ext 5 5  Ankle d/f 5 5    Reflexes: 2+ and symmetric throughout, babinski absent bilaterally  Sensory: vibration is severely reduced in the toes, mod ankles, mild knees, JPS normal in the toes   Romberg is present  Coordination: no ataxia or dysmetria  Gait: normal base and stride, tandem gait is nmildly impaired, able to balance on one foot and hop x 5 on the left foot, x 2 on the right     Tests/Imaging:     Vitamin D 57.7  JCV Ab unk    Abs Lymph unk    MRI Brain  2019 - low lesion burden, multiple small pvl, few jcl    MRI Cervical spine   2018 - study limited by motion artifact, multiple eccentric cord lesions in upper cervical cord with large lesion dorsal cord c2    MRI Thoracic spine   2019 - images done, but no t2 images available for personal review    Assessment: 31 y/o woman with relapsing remitting MS.  She does have some motor fatigue. Unclear if this is due to incomplete recovery from a relapse vs secondary progressive disease. She is appropriately maintaining an exercise routine.     It is appropriate for her to continue with a high potency disease modifying therapy given high lesion burden. She is tolerating ocrevus.  Monitoring discussed. Next due in May.     For fatigue she was agreeable to a trial of modafinil. Common risks and side effects were reviewed.     She is also having  symptoms of neurogenic bladder. Agreeable to a trial of oxybutynin prn.     Plan:   - blood work to assess for hematologic/immunologic toxicity  - continue ocrevus q6 mo   - modafinil 100-200 mg daily prn fatigue   - follow up in 6 months, mri 1 year    Note was completed with the assistance of Dragon Fluency software which can often result in accidental word substitutions.     Billing based on complexity  Darby Sandoval MD on 1/13/2023 at 2:57 PM

## 2023-01-13 NOTE — NURSING NOTE
Chief Complaint   Patient presents with     MS     New Patient     Establish care      Vitals were taken and medications were reconciled.   Son Gonzalez, EMT  2:32 PM

## 2023-01-13 NOTE — PATIENT INSTRUCTIONS
Continue Ocrevus     Blood work on the day of infusion     Take vitamin D3 5000 international unit(s) daily     Try oxybuytnin as needed for your bladder   Side effects include dry mouth    Modafinil for fatigue   You can take 1-2 tablets a day   Do not take after 3PM because it will interfere with sleep     Follow up in 6 months  *we need records from [facility]

## 2023-01-13 NOTE — LETTER
1/13/2023       RE: Mary Joshua  [address] [place] [address]     Dear Colleague,    Thank you for referring your patient, Mary Joshua, to the [facility] [place] [facility]. Please see a copy of my visit note below.    Date of Service: 1/13/2023    [facility] Neurology   MS Clinic Evaluation    Subjective: 31 y/o woman who presents for evaluation of MS     Disease onset at age 17 when she experienced numbness and tingling in her hands and feet and a right ON.     She has been on a number of disease modifying therapies since diagnosis.  Most recently she was placed on ocrevus and is tolerating this well.     She notes that whenever she gets a fever she is not able to walk. She routinely experiences uhthoff's phenomenon.      Right foot drop sets in after less than a mile.   When writing her right hand will fatigue.     She exercises routinely.  Bikes x 1 hour.      She struggles with urinary urgency and occasional hesitation. No UTIs.     She struggles with fatigue that will hit her around 1PM.  Methylphenidate 5 mg bid worked well for her in the past, but she is open to other options. She doesn't like having the controlled substance in her home with young children.     She is taking vitamin D3 5000 international unit(s) daily.     Disease onset: age 17, cervical sensory myelitis, R ON   LR uncertain     DMD hx:   Avonex 2008, brief, suffered severe flu side effects  betaseron 2008 x several months, radiologic progression   Copaxone several years, interrupted for pregnancy  Copaxone 40 mg - developed hives  Gilenya 2016 - discontinued for pregnancy  Copaxone 20 mg consistently [id number], radiologic progression   tecfidera 2019   ocrevus LD 11/2022, tolerating       No Known Allergies    Current Outpatient Medications   Medication     ocrelizumab 600 mg     ondansetron (ZOFRAN ODT) 4 MG ODT tab      PNV62/FA/OM3/DHA/EPA/FISH OIL (PRENATAL GUMMY ORAL)     valACYclovir (VALTREX) 1000 MG tablet     venlafaxine (EFFEXOR XR) 75 MG 24 hr capsule     oxyCODONE (ROXICODONE) 5 MG tablet     senna-docusate (SENOKOT-S/PERICOLACE) 8.6-50 MG tablet     No current facility-administered medications for this visit.        Past medical, surgical, social and family history was personally reviewed. Pertinent details noted above.     Physical Examination:   /86 (BP Location: Right arm, Patient Position: Sitting, Cuff Size: Adult Small)   Pulse 94   Wt 70.9 kg (156 lb 6.4 oz)   SpO2 97%   BMI 23.10 kg/m      General: no acute distress  Cranial nerves:   VFFC  PERRL w/no RAPD  EOM full w/no LUIS CARLOS   Face symmetric  Hearing intact  No dysarthria   Motor:   Tone is mildly increased in the lower extremities  Bulk is normal     R L  Deltoid  5 5  Biceps  5 5  Triceps 5 5  Wrist ext 5 5  Finger ext 5 5  Finger abd 5 5    Hip flexion 4+ 4+  Knee flexion 4+ 5  Knee ext 5 5  Ankle d/f 5 5    Reflexes: 2+ and symmetric throughout, babinski absent bilaterally  Sensory: vibration is severely reduced in the toes, mod ankles, mild knees, JPS normal in the toes   Romberg is present  Coordination: no ataxia or dysmetria  Gait: normal base and stride, tandem gait is nmildly impaired, able to balance on one foot and hop x 5 on the left foot, x 2 on the right     Tests/Imaging:     Vitamin D 57.7  JCV Ab unk    Abs Lymph unk    MRI Brain  2019 - low lesion burden, multiple small pvl, few jcl    MRI Cervical spine   2018 - study limited by motion artifact, multiple eccentric cord lesions in upper cervical cord with large lesion dorsal cord c2    MRI Thoracic spine   2019 - images done, but no t2 images available for personal review    Assessment: 31 y/o woman with relapsing remitting MS.  She does have some motor fatigue. Unclear if this is due to incomplete recovery from a relapse vs secondary progressive disease. She is appropriately  maintaining an exercise routine.     It is appropriate for her to continue with a high potency disease modifying therapy given high lesion burden. She is tolerating ocrevus.  Monitoring discussed. Next due in May.     For fatigue she was agreeable to a trial of modafinil. Common risks and side effects were reviewed.     She is also having symptoms of neurogenic bladder. Agreeable to a trial of oxybutynin prn.     Plan:   - blood work to assess for hematologic/immunologic toxicity  - continue ocrevus q6 mo   - modafinil 100-200 mg daily prn fatigue   - follow up in 6 months, mri 1 year    Note was completed with the assistance of Dragon Fluency software which can often result in accidental word substitutions.     Billing based on complexity      Darby Sandoval MD on 1/13/2023 at 2:57 PM

## 2023-05-04 ENCOUNTER — INFUSION THERAPY VISIT (OUTPATIENT)
Dept: INFUSION THERAPY | Facility: CLINIC | Age: 33
End: 2023-05-04
Attending: PHYSICIAN ASSISTANT
Payer: COMMERCIAL

## 2023-05-04 VITALS
WEIGHT: 140.9 LBS | DIASTOLIC BLOOD PRESSURE: 78 MMHG | TEMPERATURE: 97.8 F | SYSTOLIC BLOOD PRESSURE: 106 MMHG | RESPIRATION RATE: 16 BRPM | BODY MASS INDEX: 20.81 KG/M2 | HEART RATE: 75 BPM | OXYGEN SATURATION: 95 %

## 2023-05-04 DIAGNOSIS — G35 MS (MULTIPLE SCLEROSIS) (H): Primary | ICD-10-CM

## 2023-05-04 DIAGNOSIS — Z51.81 THERAPEUTIC DRUG MONITORING: ICD-10-CM

## 2023-05-04 LAB
BASOPHILS # BLD AUTO: 0 10E3/UL (ref 0–0.2)
BASOPHILS NFR BLD AUTO: 1 %
CD19 B CELL COMMENT: ABNORMAL
CD19 CELLS # BLD: <1 CELLS/UL (ref 107–698)
CD19 CELLS NFR BLD: <1 % (ref 6–27)
EOSINOPHIL # BLD AUTO: 0 10E3/UL (ref 0–0.7)
EOSINOPHIL NFR BLD AUTO: 1 %
ERYTHROCYTE [DISTWIDTH] IN BLOOD BY AUTOMATED COUNT: 12.3 % (ref 10–15)
HCT VFR BLD AUTO: 39.3 % (ref 35–47)
HGB BLD-MCNC: 13.4 G/DL (ref 11.7–15.7)
IGG SERPL-MCNC: 741 MG/DL (ref 610–1616)
IMM GRANULOCYTES # BLD: 0 10E3/UL
IMM GRANULOCYTES NFR BLD: 0 %
LYMPHOCYTES # BLD AUTO: 1 10E3/UL (ref 0.8–5.3)
LYMPHOCYTES NFR BLD AUTO: 33 %
MCH RBC QN AUTO: 32.1 PG (ref 26.5–33)
MCHC RBC AUTO-ENTMCNC: 34.1 G/DL (ref 31.5–36.5)
MCV RBC AUTO: 94 FL (ref 78–100)
MONOCYTES # BLD AUTO: 0.2 10E3/UL (ref 0–1.3)
MONOCYTES NFR BLD AUTO: 8 %
NEUTROPHILS # BLD AUTO: 1.8 10E3/UL (ref 1.6–8.3)
NEUTROPHILS NFR BLD AUTO: 57 %
NRBC # BLD AUTO: 0 10E3/UL
NRBC BLD AUTO-RTO: 0 /100
PLATELET # BLD AUTO: 192 10E3/UL (ref 150–450)
RBC # BLD AUTO: 4.18 10E6/UL (ref 3.8–5.2)
WBC # BLD AUTO: 3.1 10E3/UL (ref 4–11)

## 2023-05-04 PROCEDURE — 96366 THER/PROPH/DIAG IV INF ADDON: CPT

## 2023-05-04 PROCEDURE — 96365 THER/PROPH/DIAG IV INF INIT: CPT

## 2023-05-04 PROCEDURE — 250N000011 HC RX IP 250 OP 636: Performed by: PSYCHIATRY & NEUROLOGY

## 2023-05-04 PROCEDURE — 250N000013 HC RX MED GY IP 250 OP 250 PS 637: Performed by: PSYCHIATRY & NEUROLOGY

## 2023-05-04 PROCEDURE — 85025 COMPLETE CBC W/AUTO DIFF WBC: CPT

## 2023-05-04 PROCEDURE — 36415 COLL VENOUS BLD VENIPUNCTURE: CPT

## 2023-05-04 PROCEDURE — 258N000003 HC RX IP 258 OP 636: Performed by: PSYCHIATRY & NEUROLOGY

## 2023-05-04 PROCEDURE — 96375 TX/PRO/DX INJ NEW DRUG ADDON: CPT

## 2023-05-04 PROCEDURE — 82784 ASSAY IGA/IGD/IGG/IGM EACH: CPT

## 2023-05-04 PROCEDURE — 86355 B CELLS TOTAL COUNT: CPT

## 2023-05-04 RX ORDER — ALBUTEROL SULFATE 90 UG/1
1-2 AEROSOL, METERED RESPIRATORY (INHALATION)
Status: CANCELLED
Start: 2023-10-31

## 2023-05-04 RX ORDER — ALBUTEROL SULFATE 0.83 MG/ML
2.5 SOLUTION RESPIRATORY (INHALATION)
Status: DISCONTINUED | OUTPATIENT
Start: 2023-05-04 | End: 2023-05-04 | Stop reason: HOSPADM

## 2023-05-04 RX ORDER — ACETAMINOPHEN 325 MG/1
650 TABLET ORAL ONCE
Status: COMPLETED | OUTPATIENT
Start: 2023-05-04 | End: 2023-05-04

## 2023-05-04 RX ORDER — DIPHENHYDRAMINE HCL 50 MG
50 CAPSULE ORAL ONCE
Status: COMPLETED | OUTPATIENT
Start: 2023-05-04 | End: 2023-05-04

## 2023-05-04 RX ORDER — HEPARIN SODIUM (PORCINE) LOCK FLUSH IV SOLN 100 UNIT/ML 100 UNIT/ML
5 SOLUTION INTRAVENOUS
Status: CANCELLED | OUTPATIENT
Start: 2023-10-31

## 2023-05-04 RX ORDER — DIPHENHYDRAMINE HYDROCHLORIDE 50 MG/ML
50 INJECTION INTRAMUSCULAR; INTRAVENOUS
Status: CANCELLED
Start: 2023-10-31

## 2023-05-04 RX ORDER — METHYLPREDNISOLONE SODIUM SUCCINATE 125 MG/2ML
125 INJECTION, POWDER, LYOPHILIZED, FOR SOLUTION INTRAMUSCULAR; INTRAVENOUS ONCE
Status: COMPLETED | OUTPATIENT
Start: 2023-05-04 | End: 2023-05-04

## 2023-05-04 RX ORDER — DIPHENHYDRAMINE HYDROCHLORIDE 50 MG/ML
50 INJECTION INTRAMUSCULAR; INTRAVENOUS
Status: DISCONTINUED | OUTPATIENT
Start: 2023-05-04 | End: 2023-05-04 | Stop reason: HOSPADM

## 2023-05-04 RX ORDER — METHYLPREDNISOLONE SODIUM SUCCINATE 125 MG/2ML
125 INJECTION, POWDER, LYOPHILIZED, FOR SOLUTION INTRAMUSCULAR; INTRAVENOUS
Status: DISCONTINUED | OUTPATIENT
Start: 2023-05-04 | End: 2023-05-04 | Stop reason: HOSPADM

## 2023-05-04 RX ORDER — ALBUTEROL SULFATE 0.83 MG/ML
2.5 SOLUTION RESPIRATORY (INHALATION)
Status: CANCELLED | OUTPATIENT
Start: 2023-10-31

## 2023-05-04 RX ORDER — MEPERIDINE HYDROCHLORIDE 50 MG/ML
25 INJECTION INTRAMUSCULAR; INTRAVENOUS; SUBCUTANEOUS EVERY 30 MIN PRN
Status: DISCONTINUED | OUTPATIENT
Start: 2023-05-04 | End: 2023-05-04 | Stop reason: HOSPADM

## 2023-05-04 RX ORDER — METHYLPREDNISOLONE SODIUM SUCCINATE 125 MG/2ML
125 INJECTION, POWDER, LYOPHILIZED, FOR SOLUTION INTRAMUSCULAR; INTRAVENOUS
Status: CANCELLED
Start: 2023-10-31

## 2023-05-04 RX ORDER — EPINEPHRINE 1 MG/ML
0.3 INJECTION, SOLUTION INTRAMUSCULAR; SUBCUTANEOUS EVERY 5 MIN PRN
Status: CANCELLED | OUTPATIENT
Start: 2023-10-31

## 2023-05-04 RX ORDER — EPINEPHRINE 1 MG/ML
0.3 INJECTION, SOLUTION INTRAMUSCULAR; SUBCUTANEOUS EVERY 5 MIN PRN
Status: DISCONTINUED | OUTPATIENT
Start: 2023-05-04 | End: 2023-05-04 | Stop reason: HOSPADM

## 2023-05-04 RX ORDER — METHYLPREDNISOLONE SODIUM SUCCINATE 125 MG/2ML
125 INJECTION, POWDER, LYOPHILIZED, FOR SOLUTION INTRAMUSCULAR; INTRAVENOUS ONCE
Status: CANCELLED | OUTPATIENT
Start: 2023-10-31

## 2023-05-04 RX ORDER — HEPARIN SODIUM,PORCINE 10 UNIT/ML
5 VIAL (ML) INTRAVENOUS
Status: CANCELLED | OUTPATIENT
Start: 2023-10-31

## 2023-05-04 RX ORDER — DIPHENHYDRAMINE HCL 50 MG
50 CAPSULE ORAL ONCE
Status: CANCELLED | OUTPATIENT
Start: 2023-10-31

## 2023-05-04 RX ORDER — MEPERIDINE HYDROCHLORIDE 50 MG/ML
25 INJECTION INTRAMUSCULAR; INTRAVENOUS; SUBCUTANEOUS EVERY 30 MIN PRN
Status: CANCELLED | OUTPATIENT
Start: 2023-10-31

## 2023-05-04 RX ORDER — ACETAMINOPHEN 325 MG/1
650 TABLET ORAL ONCE
Status: CANCELLED | OUTPATIENT
Start: 2023-10-31

## 2023-05-04 RX ORDER — ALBUTEROL SULFATE 90 UG/1
1-2 AEROSOL, METERED RESPIRATORY (INHALATION)
Status: DISCONTINUED | OUTPATIENT
Start: 2023-05-04 | End: 2023-05-04 | Stop reason: HOSPADM

## 2023-05-04 RX ADMIN — DIPHENHYDRAMINE HYDROCHLORIDE 50 MG: 50 CAPSULE ORAL at 08:37

## 2023-05-04 RX ADMIN — OCRELIZUMAB 600 MG: 300 INJECTION INTRAVENOUS at 08:59

## 2023-05-04 RX ADMIN — SODIUM CHLORIDE 250 ML: 9 INJECTION, SOLUTION INTRAVENOUS at 08:44

## 2023-05-04 RX ADMIN — ACETAMINOPHEN 650 MG: 325 TABLET ORAL at 08:37

## 2023-05-04 RX ADMIN — METHYLPREDNISOLONE SODIUM SUCCINATE 125 MG: 125 INJECTION, POWDER, FOR SOLUTION INTRAMUSCULAR; INTRAVENOUS at 08:37

## 2023-05-04 NOTE — PROGRESS NOTES
~~~ NOTE: If the patient answers yes to any of the questions below, hold the infusion and contact ordering provider or on-call provider.    1. Have you recently had an elevated temperature, fever, chills, productive cough, coughing for 3 weeks or longer or hemoptysis,  abnormal vital signs, night sweats,  chest pain or have you noticed a decrease in your appetite, unexplained weight loss or fatigue? No  2. Do you have any open wounds or new incisions? No  3. Do you have any upcoming hospitalizations or surgeries? Does not include esophagogastroduodenoscopy, colonoscopy, endoscopic retrograde cholangiopancreatography (ERCP), endoscopic ultrasound (EUS), dental procedures or joint aspiration/steroid injections No  4. Do you currently have any signs of illness or infection or are you on any antibiotics? No  5. Have you had any new, sudden or worsening abdominal pain? No  6. Have you or anyone in your household received a live vaccination in the past 4 weeks? Please note: No live vaccines while on biologic/chemotherapy until 6 months after the last treatment. Patient can receive the flu vaccine (shot only), pneumovax and the Covid vaccine. It is optimal for the patient to get these vaccines mid cycle, but they can be given at any time as long as it is not on the day of the infusion. No  7. Have you recently been diagnosed with any new nervous system diseases (ie. Multiple sclerosis, Guillain [place], seizures, neurological changes) or cancer diagnosis? Are you on any form of radiation or chemotherapy? No  8. Are you pregnant or breast feeding or do you have plans of pregnancy in the future? No  9. Have you been having any signs of worsening depression or suicidal ideations?  (benlysta only) No  10. Have there been any other new onset medical symptoms? No  11. Have you had any new blood clots? (IVIG only) No       Infusion Nursing Note:  Mary Joshua presents today for Ocrevus for MS. Pt has received Ocrevus 5 or 6  times previously she states but just moved back to the [facility] from [place] and will get her 6 month Ocrevus infusion here.    Patient seen by provider today: No   present during visit today: Not Applicable.    Note: Pt tolerated infusion well. Pt instructed to stay for observation for 1 hour after infusion but pt declined. Vitals rechecked and pt discharged.  Pt given po benadryl, tylenol and solumedrol iv    Intravenous Access:  Peripheral IV placed.    Treatment Conditions:  Not Applicable.  Treatment not dependent on labs drawn.    Post Infusion Assessment:  Patient tolerated infusion without incident.       Discharge Plan:   Patient and/or family verbalized understanding of discharge instructions and all questions answered.      Dodie Nunn RN

## 2023-05-05 ENCOUNTER — DOCUMENTATION ONLY (OUTPATIENT)
Dept: NEUROLOGY | Facility: CLINIC | Age: 33
End: 2023-05-05
Payer: COMMERCIAL

## 2023-05-05 NOTE — PROGRESS NOTES
[facility] approval for service received, valid from 05/04/2023 through 05/02/2024.  Son Gonzalez EMT 05/05/2023 8:34AM

## 2023-05-05 NOTE — RESULT ENCOUNTER NOTE
Please notify pt that her blood work reveals appropriate absence of b cells. Her blood count is normal aside from her white blood cell count - it is a touch low because of her treatment. This is not concerning. Protein level is at goal. Darby Sandoval MD on 5/4/2023 at 7:06 PM

## 2023-05-11 LAB — SCANNED LAB RESULT: NORMAL

## 2023-06-05 DIAGNOSIS — N31.9 NEUROGENIC BLADDER: ICD-10-CM

## 2023-06-05 DIAGNOSIS — G35 MS (MULTIPLE SCLEROSIS) (H): ICD-10-CM

## 2023-06-05 RX ORDER — OXYBUTYNIN CHLORIDE 5 MG/1
5 TABLET ORAL 2 TIMES DAILY
Qty: 60 TABLET | Refills: 4 | Status: SHIPPED | OUTPATIENT
Start: 2023-06-05 | End: 2023-12-01

## 2023-06-05 NOTE — TELEPHONE ENCOUNTER
Received refill request for oxybutynin from [facility]; Patient was last seen in Jan 2023 and has follow up appointment in July 2023 with Dr Sandoval. Refilled per MS refill protocol.    Vidya Weinberg RN

## 2023-08-09 ENCOUNTER — OFFICE VISIT (OUTPATIENT)
Dept: NEUROLOGY | Facility: CLINIC | Age: 33
End: 2023-08-09
Attending: PSYCHIATRY & NEUROLOGY
Payer: COMMERCIAL

## 2023-08-09 VITALS
HEART RATE: 94 BPM | WEIGHT: 139.5 LBS | DIASTOLIC BLOOD PRESSURE: 87 MMHG | OXYGEN SATURATION: 98 % | BODY MASS INDEX: 20.6 KG/M2 | SYSTOLIC BLOOD PRESSURE: 121 MMHG

## 2023-08-09 DIAGNOSIS — R53.82 CHRONIC FATIGUE: ICD-10-CM

## 2023-08-09 DIAGNOSIS — R25.2 SPASTICITY: ICD-10-CM

## 2023-08-09 DIAGNOSIS — G82.20 PARAPARESIS (H): ICD-10-CM

## 2023-08-09 DIAGNOSIS — G35 MS (MULTIPLE SCLEROSIS) (H): Primary | ICD-10-CM

## 2023-08-09 PROCEDURE — 99214 OFFICE O/P EST MOD 30 MIN: CPT | Performed by: PSYCHIATRY & NEUROLOGY

## 2023-08-09 PROCEDURE — G0463 HOSPITAL OUTPT CLINIC VISIT: HCPCS | Performed by: PSYCHIATRY & NEUROLOGY

## 2023-08-09 RX ORDER — BACLOFEN 10 MG/1
TABLET ORAL
Qty: 60 TABLET | Refills: 5 | Status: SHIPPED | OUTPATIENT
Start: 2023-08-09 | End: 2023-10-23 | Stop reason: SINTOL

## 2023-08-09 RX ORDER — DIAZEPAM 5 MG
TABLET ORAL
Qty: 3 TABLET | Refills: 0 | Status: SHIPPED | OUTPATIENT
Start: 2024-01-15 | End: 2024-02-20

## 2023-08-09 RX ORDER — MODAFINIL 100 MG/1
100-200 TABLET ORAL DAILY PRN
Qty: 60 TABLET | Refills: 5 | Status: SHIPPED | OUTPATIENT
Start: 2023-08-09 | End: 2024-02-20

## 2023-08-09 ASSESSMENT — PAIN SCALES - GENERAL: PAINLEVEL: NO PAIN (0)

## 2023-08-09 NOTE — LETTER
8/9/2023       RE: Mary Joshua  [address] [place] [address]       Dear Colleague,    Thank you for referring your patient, Mary Joshua, to the [facility] [place] at [facility]. Please see a copy of my visit note below.    Date of Service: 8/9/2023    [facility] Neurology   MS Clinic Evaluation    Subjective: 34 y/o woman who presents for evaluation of MS     She does not report any discrete new symptoms related to multiple sclerosis.  However she does feel that her chronic symptoms have gotten worse progressively.  Leaves that the change has occurred gradually, though she is uncertain.  She notices that she has a tendency to sit to tip over when she stands up.  She has had an increased number of falls.  Symptoms are worse when she is active.  Difficulty walking through doorways.    She remains active by biking for approximately 1 hour each day.  Timing of the bike ride varies.  This is a stationary bike.    She reports some restlessness in her legs.  This alternates the right or the left.  It can happen during the daytime, but is a bit more common in the evening time.  It affects her calf.  It is a tightening sensation.  She does stretch regularly.  Symptoms also come on when she is driving or sitting for long period of time.  She has never tried a medication for spasticity.    She is taking vitamin D3 5000 international unit(s) daily.     Disease onset: age 17, cervical sensory myelitis, R ON   LR uncertain     DMD hx:   Avonex 2008, brief, suffered severe flu side effects  betaseron 2008 x several months, radiologic progression   Copaxone several years, interrupted for pregnancy  Copaxone 40 mg - developed hives  Gilenya 2016 - discontinued for pregnancy  Copaxone 20 mg consistently [id number], radiologic progression   tecfidera 2019   ocrevus LD 11/2022, tolerating       No Known Allergies    Current Outpatient  Medications   Medication    modafinil (PROVIGIL) 100 MG tablet    ocrelizumab 600 mg    oxybutynin (DITROPAN) 5 MG tablet    PNV62/FA/OM3/DHA/EPA/FISH OIL (PRENATAL GUMMY ORAL)    valACYclovir (VALTREX) 1000 MG tablet    venlafaxine (EFFEXOR XR) 75 MG 24 hr capsule     No current facility-administered medications for this visit.        Past medical, surgical, social and family history was personally reviewed. Pertinent details noted above.     Physical Examination:   /87 (BP Location: Left arm, Patient Position: Sitting, Cuff Size: Adult Small)   Pulse 94   Wt 63.3 kg (139 lb 8 oz)   SpO2 98%   BMI 20.60 kg/m      General: no acute distress  Cranial nerves:   VFFC  PERRL w/no RAPD  EOM full w/no LUIS CARLOS   Face symmetric  Hearing intact  No dysarthria   Motor:   Tone is mildly increased in the lower extremities  Bulk is normal     R L  Deltoid  5 5  Biceps  5 5  Triceps 5 5  Wrist ext 5 5  Finger ext 5 5  Finger abd 5 5    Hip flexion 4 4+  Knee flexion 4+ 5  Knee ext 5 5  Ankle d/f 5- 5    Reflexes: 2+ and symmetric throughout, babinski absent bilaterally  Sensory: vibration is severely reduced in the toes, mod ankles, mild knees, JPS normal in the toes   Romberg is present  Coordination: no ataxia or dysmetria  Gait: spastic LLE sl wide based, tandem gait is moderately impaired, able to balance on one foot x 10 seconds     Tests/Imaging:     Vitamin D 57.7  JCV Ab neg    ALC 1000  Igg 749    MRI Brain  2019 - low lesion burden, multiple small pvl, few jcl    MRI Cervical spine   2018 - study limited by motion artifact, multiple eccentric cord lesions in upper cervical cord with large lesion dorsal cord c2    MRI Thoracic spine   2019 - images done, but no t2 images available for personal review    Assessment: 34 y/o woman with relapsing remitting MS. clinical examination today does reveal some progression of right lower extremity weakness.  She also is noted to have spasticity of the left lower extremity when  she walks.  I suspect that this is related to aging of old MS lesions.  She will continue with Ocrevus every 6 months.  She has not experienced any adverse effects to the treatment.    Think that she would benefit from working with physical therapy.  I have also recommended a trial of baclofen.  Common risks and side effects were discussed in detail.    She was encouraged to continue with her routine exercise, though I do have concerns that if she exercises earlier in the day this may be draining herself of energy and contributing to gait instability.  I have therefore encouraged her to do her exercise routine toward the end of the day so that it does not increase her risk of falls.    Plan:   -Continue Ocrevus every 6 months  - Blood work the day of infusion to assess for hematologic or immunologic toxicity  - Baclofen trial  - Physical therapy referral  - Modafinil refilled  - Follow-up in 6 months    Note was completed with the assistance of Dragon Fluency software which can often result in accidental word substitutions.     30 minutes spent in the care of this patient on the date of service.  Darby Sandoval MD on 8/9/2023 at 3:39 PM        Again, thank you for allowing me to participate in the care of your patient.      Sincerely,    Darby Sandoval MD

## 2023-08-09 NOTE — PATIENT INSTRUCTIONS
Your right leg has gotten a bit weaker     I recommend you work with physical therapy     Biking - consider moving to evening hours (?using up your energy too early in the day)   Or split in two sessions     I think that muscle tightness is causing the restless legs and slowing down your walking   Try baclofen   Start with 5 mg twice per day for 1 week, then take 1 tablet twice per day   Let me know if you feel weaker or too tired when you take this medication     Mri in 6 months     See me after mri

## 2023-08-09 NOTE — NURSING NOTE
Chief Complaint   Patient presents with    MS    RECHECK     MS follow up     Vitals were taken and medications were reconciled.   Son Gonzalez, EMT  3:07 PM

## 2023-08-09 NOTE — PROGRESS NOTES
Date of Service: 8/9/2023    [facility] Neurology   MS Clinic Evaluation    Subjective: 34 y/o woman who presents for evaluation of MS     She does not report any discrete new symptoms related to multiple sclerosis.  However she does feel that her chronic symptoms have gotten worse progressively.  Leaves that the change has occurred gradually, though she is uncertain.  She notices that she has a tendency to sit to tip over when she stands up.  She has had an increased number of falls.  Symptoms are worse when she is active.  Difficulty walking through doorways.    She remains active by biking for approximately 1 hour each day.  Timing of the bike ride varies.  This is a stationary bike.    She reports some restlessness in her legs.  This alternates the right or the left.  It can happen during the daytime, but is a bit more common in the evening time.  It affects her calf.  It is a tightening sensation.  She does stretch regularly.  Symptoms also come on when she is driving or sitting for long period of time.  She has never tried a medication for spasticity.    She is taking vitamin D3 5000 international unit(s) daily.     Disease onset: age 17, cervical sensory myelitis, R ON   LR uncertain     DMD hx:   Avonex 2008, brief, suffered severe flu side effects  betaseron 2008 x several months, radiologic progression   Copaxone several years, interrupted for pregnancy  Copaxone 40 mg - developed hives  Gilenya 2016 - discontinued for pregnancy  Copaxone 20 mg consistently [id number], radiologic progression   tecfidera 2019   ocrevus LD 11/2022, tolerating       No Known Allergies    Current Outpatient Medications   Medication    modafinil (PROVIGIL) 100 MG tablet    ocrelizumab 600 mg    oxybutynin (DITROPAN) 5 MG tablet    PNV62/FA/OM3/DHA/EPA/FISH OIL (PRENATAL GUMMY ORAL)    valACYclovir (VALTREX) 1000 MG tablet    venlafaxine (EFFEXOR XR) 75 MG 24 hr capsule     No current facility-administered medications for this  visit.        Past medical, surgical, social and family history was personally reviewed. Pertinent details noted above.     Physical Examination:   /87 (BP Location: Left arm, Patient Position: Sitting, Cuff Size: Adult Small)   Pulse 94   Wt 63.3 kg (139 lb 8 oz)   SpO2 98%   BMI 20.60 kg/m      General: no acute distress  Cranial nerves:   VFFC  PERRL w/no RAPD  EOM full w/no LUIS CARLOS   Face symmetric  Hearing intact  No dysarthria   Motor:   Tone is mildly increased in the lower extremities  Bulk is normal     R L  Deltoid  5 5  Biceps  5 5  Triceps 5 5  Wrist ext 5 5  Finger ext 5 5  Finger abd 5 5    Hip flexion 4 4+  Knee flexion 4+ 5  Knee ext 5 5  Ankle d/f 5- 5    Reflexes: 2+ and symmetric throughout, babinski absent bilaterally  Sensory: vibration is severely reduced in the toes, mod ankles, mild knees, JPS normal in the toes   Romberg is present  Coordination: no ataxia or dysmetria  Gait: spastic LLE sl wide based, tandem gait is moderately impaired, able to balance on one foot x 10 seconds     Tests/Imaging:     Vitamin D 57.7  JCV Ab neg    ALC 1000  Igg 749    MRI Brain  2019 - low lesion burden, multiple small pvl, few jcl    MRI Cervical spine   2018 - study limited by motion artifact, multiple eccentric cord lesions in upper cervical cord with large lesion dorsal cord c2    MRI Thoracic spine   2019 - images done, but no t2 images available for personal review    Assessment: 32 y/o woman with relapsing remitting MS. clinical examination today does reveal some progression of right lower extremity weakness.  She also is noted to have spasticity of the left lower extremity when she walks.  I suspect that this is related to aging of old MS lesions.  She will continue with Ocrevus every 6 months.  She has not experienced any adverse effects to the treatment.    Think that she would benefit from working with physical therapy.  I have also recommended a trial of baclofen.  Common risks and side  effects were discussed in detail.    She was encouraged to continue with her routine exercise, though I do have concerns that if she exercises earlier in the day this may be draining herself of energy and contributing to gait instability.  I have therefore encouraged her to do her exercise routine toward the end of the day so that it does not increase her risk of falls.    Plan:   -Continue Ocrevus every 6 months  - Blood work the day of infusion to assess for hematologic or immunologic toxicity  - Baclofen trial  - Physical therapy referral  - Modafinil refilled  - Follow-up in 6 months    Note was completed with the assistance of Dragon Fluency software which can often result in accidental word substitutions.     30 minutes spent in the care of this patient on the date of service.  Darby Sandoval MD on 8/9/2023 at 3:39 PM

## 2023-08-18 ENCOUNTER — THERAPY VISIT (OUTPATIENT)
Dept: PHYSICAL THERAPY | Facility: CLINIC | Age: 33
End: 2023-08-18
Attending: PSYCHIATRY & NEUROLOGY
Payer: COMMERCIAL

## 2023-08-18 DIAGNOSIS — G35 MS (MULTIPLE SCLEROSIS) (H): ICD-10-CM

## 2023-08-18 DIAGNOSIS — G82.20 PARAPARESIS (H): ICD-10-CM

## 2023-08-18 DIAGNOSIS — R25.2 SPASTICITY: ICD-10-CM

## 2023-08-18 PROCEDURE — 97161 PT EVAL LOW COMPLEX 20 MIN: CPT | Mod: GP

## 2023-08-18 PROCEDURE — 97110 THERAPEUTIC EXERCISES: CPT | Mod: GP

## 2023-08-18 PROCEDURE — 97112 NEUROMUSCULAR REEDUCATION: CPT | Mod: GP

## 2023-08-18 NOTE — PROGRESS NOTES
PHYSICAL THERAPY EVALUATION  Type of Visit: Evaluation    See electronic medical record for Abuse and Falls Screening details.    Subjective       Presenting condition or subjective complaint:    Pt is a 32 y/o female with relapse remitting MS with complaints of worsening balance and gait. Stairs have become more difficult to coordinate. She started exercising more frequently recently which she says has improved her mood but has not improved symptoms. She has been having more falls recently (weekly), most recently fell walking up the stairs yesterday.  No injuries to report.  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  She went to the zoo today for an hour and had to use [employer] Dashboard for support after 20 minutes.    Date of onset: 06/02/08    Relevant medical history:     Dates & types of surgery:      Prior diagnostic imaging/testing results:     MRI  Prior therapy history for the same diagnosis, illness or injury:    Yes, most recently in 2016    Prior Level of Function  Transfers: Independent  Ambulation: Independent  ADL: Independent  IADL:  IND    Living Environment  Social support:   4 kids (2-12 years old), 2 with disability. Recently moved from [place] after divorce.  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  Pain more generalized, not focal.     Objective   Cognitive Status Examination  Orientation: Oriented to person, place and time   Level of Consciousness: Alert  Follows Commands and Answers Questions: 100% of the time  Personal Safety and Judgement: Intact  Memory: Intact    OBSERVATION: Pt is a pleasant 32 y/o female presenting alone to PT.  INTEGUMENTARY: Intact  POSTURE: Intoeing bilaterally, otherwise WFL  PALPATION: NT  RANGE OF MOTION: LE ROM WFL  UE ROM WFL  STRENGTH: UE Strength WFL  Pain: - none + mild ++ moderate +++ severe  Strength Scale: 0-5/5 Left Right   Ankle Dorsiflexion 4+ 4   Ankle Plantarflexion 4+ 4     Pain: - none + mild ++ moderate +++ severe  Strength Scale: 0-5/5 Left Right   Hip Flexion 4+ 4-   Hip Extension 4+ 4   Hip Abduction 5 4   Hip Adduction 5 4   Knee Flexion 5 4   Knee Extension 5 4       BED MOBILITY: Independent    TRANSFERS: Independent    WHEELCHAIR MOBILITY: NA    GAIT:   Level of Bibb: Independent  Assistive Device(s): None  Gait Deviations: Base of support increased  Stride length decreased  Toe in L  Toe in R  Drop foot R  Gait Distance: 100 ft  Stairs: NT    BALANCE: Sitting Balance (static):Normal  Sit to Stand Balance:Fair  Standing Balance (static):Fair  Standing Balance (dynamic):Fair    SPECIAL TESTS  6 Minute Walk Test (6MWT)   1,290 ft     Did not require standing rest break, Vitals response: VSS, Fatigue/OMNI Effort Scale: 2/10     Timed Up and Go (TUG) - sec 9.2 sec   Modified CTSIB Conditions (sec) Cond 1: 30s, no sway  Cond 2: 30s, max sway, writer's hands on GB but pt finished  Cond 3: 30s, min sway  Cond 4: 15s, max sway, writer's hands on GB to prevent LOB       SENSATION:   LE Sensory Exam Left LE Right LE   Light Touch Impaired Impaired   Vibration Impaired Impaired   Sharp/Dull Discrimination Impaired Impaired       REFLEXES: WNL  COORDINATION: grossly intact  MUSCLE TONE: Hypertonic, RUE tone abnormal, LLE tone abnormal      Assessment & Plan    CLINICAL IMPRESSIONS  Medical Diagnosis: MS (multiple sclerosis) (H) (G35)    Treatment Diagnosis: Fractionated movement deficit   Impression/Assessment: Patient is a 33 year old female with balance and fatigue complaints.  The following significant findings have been identified: Decreased strength, Impaired balance, Impaired sensation, Impaired gait, Impaired muscle performance, Decreased activity tolerance, and Instability. These impairments interfere with their ability to perform self care tasks, work tasks, recreational activities, household chores, and community mobility as compared to previous level of function.     Clinical Decision Making (Complexity):  Clinical Presentation: Evolving/Changing  Clinical Presentation Rationale: based on medical and personal factors listed in PT evaluation  Clinical Decision Making (Complexity): Moderate complexity    PLAN OF CARE  Treatment Interventions:  Interventions: Gait Training, Neuromuscular Re-education, Therapeutic Activity, Therapeutic Exercise, Self-Care/Home Management    Long Term Goals     PT Goal 1  Goal Identifier: HEP  Goal Description: Pt will perform HEP with IND progressions and regressions to help manage symptoms long term.  Goal Progress: HEP issued  Target Date: 11/15/23  PT Goal 2  Goal Identifier: Static balance  Goal Description: Pt will perform eyes closed Romberg stand with min sway for at least 30 sec and eyes closed foam pad stand for 30 sec (no sway limitations) to demonstrate improved balance without visual input.  Goal Progress: Eval: mCTSIB #2 (30 sec max sway), #4 (22 sec max sway, writer support to prevent LOB)  Target Date: 11/15/23  PT Goal 3  Goal Identifier: Falls  Goal Description: Pt will report fewer than 1 fall per month to demonstate improved safety at home and in the community.  Goal Progress: Eval: pt reports about 1 fall per week  Target Date: 11/15/23  PT Goal 4  Goal Identifier: 6MWT  Goal Description: Pt will show increase  of at least 258 ft (1,548 total) to demonstrate improved activity tolerance.  Goal Progress: Eval: 1,290 ft  Target Date: 11/15/23      Frequency of Treatment: 1x/week  Duration of Treatment: 90 days    Recommended Referrals to Other Professionals:     Education Assessment:   Learner/Method: Patient  Education Comments: Pt verbalized understanding and agreement with POC.    Risks and benefits of evaluation/treatment have been explained.   Patient/Family/caregiver agrees with Plan of Care.     Evaluation Time:     PT Eval, Low Complexity Minutes (80937): 25     Signing Clinician: RONNELL [facility]                                                                                   OUTPATIENT PHYSICAL THERAPY      PLAN OF TREATMENT FOR OUTPATIENT REHABILITATION   Patient's Last Name, First Name, Mary Jean YOB: 1990   Provider's Name   [facility]   Medical Record No.  [id number]     Onset Date: 06/02/08  Start of Care Date: 08/18/23     Medical Diagnosis:  MS (multiple sclerosis) (H) (G35)      PT Treatment Diagnosis:  Fractionated movement deficit Plan of Treatment  Frequency/Duration: 1x/week/ 90 days    Certification date from 08/18/23 to 11/15/23         See note for plan of treatment details and functional goals     Emmanuel Garcia, PT                         I CERTIFY THE NEED FOR THESE SERVICES FURNISHED UNDER        THIS PLAN OF TREATMENT AND WHILE UNDER MY CARE     (Physician attestation of this document indicates review and certification of the therapy plan).                Referring Provider:  Darby Sandoval      Initial Assessment  See Epic Evaluation- Start of Care Date: 08/18/23

## 2023-08-30 ENCOUNTER — THERAPY VISIT (OUTPATIENT)
Dept: PHYSICAL THERAPY | Facility: CLINIC | Age: 33
End: 2023-08-30
Attending: PSYCHIATRY & NEUROLOGY
Payer: COMMERCIAL

## 2023-08-30 DIAGNOSIS — G35 MS (MULTIPLE SCLEROSIS) (H): Primary | ICD-10-CM

## 2023-08-30 PROCEDURE — 97750 PHYSICAL PERFORMANCE TEST: CPT | Mod: GP,59

## 2023-08-30 PROCEDURE — 97110 THERAPEUTIC EXERCISES: CPT | Mod: GP

## 2023-08-30 PROCEDURE — 97530 THERAPEUTIC ACTIVITIES: CPT | Mod: GP

## 2023-09-06 ENCOUNTER — TELEPHONE (OUTPATIENT)
Dept: NEUROLOGY | Facility: CLINIC | Age: 33
End: 2023-09-06
Payer: COMMERCIAL

## 2023-09-06 DIAGNOSIS — R25.2 SPASTICITY: ICD-10-CM

## 2023-09-06 DIAGNOSIS — G35 MS (MULTIPLE SCLEROSIS) (H): Primary | ICD-10-CM

## 2023-09-06 NOTE — TELEPHONE ENCOUNTER
[facility] Call Center    Phone Message    May a detailed message be left on voicemail: yes     Reason for Call: Medication Question or concern regarding medication   Prescription Clarification  Name of Medication: baclofen (LIORESAL) 10 MG tablet   Prescribing Provider: Dr. Sandoval   Pharmacy:   [id number] [place] [id number] AT [facility] [address]      What on the order needs clarification?        pt states she is having bad side affects to the baclofen (LIORESAL) 10 MG tablet that was prescribed. Has been taking it for 2 days.    Please follow up with patient.    Phone number to reach patient:  Cell number on file:    Telephone Information:   Mobile [phone]       Action Taken: Message routed to: [facility]    Travel Screening: Not Applicable    Rickey Milner on 9/6/2023 at 9:46 AM   - Neurology

## 2023-09-06 NOTE — TELEPHONE ENCOUNTER
"Left voicemail for pt, asked for return call to clinic.    From last visit with Dr Sandoval:    \"I think that muscle tightness is causing the restless legs and slowing down your walking   Try baclofen   Start with 5 mg twice per day for 1 week, then take 1 tablet twice per day   Let me know if you feel weaker or too tired when you take this medication\"    Vidya Weinberg RN    "

## 2023-09-06 NOTE — TELEPHONE ENCOUNTER
Mary returned my phone call. Started baclofen two days ago. Started at 5 mg (half tablet) BID. Reporting weakness - worse in upper and lower extremities but also reporting some generalized weakness. In addition, having body aches, nausea, and diarrhea. Dr Sandoval, please advise.    Vidya Weinberg RN

## 2023-09-07 ENCOUNTER — LAB REQUISITION (OUTPATIENT)
Dept: LAB | Facility: CLINIC | Age: 33
End: 2023-09-07

## 2023-09-07 DIAGNOSIS — Z11.3 ENCOUNTER FOR SCREENING FOR INFECTIONS WITH A PREDOMINANTLY SEXUAL MODE OF TRANSMISSION: ICD-10-CM

## 2023-09-07 PROCEDURE — 87389 HIV-1 AG W/HIV-1&-2 AB AG IA: CPT | Performed by: FAMILY MEDICINE

## 2023-09-07 PROCEDURE — 87340 HEPATITIS B SURFACE AG IA: CPT | Performed by: FAMILY MEDICINE

## 2023-09-07 PROCEDURE — 86780 TREPONEMA PALLIDUM: CPT | Performed by: FAMILY MEDICINE

## 2023-09-07 PROCEDURE — 87491 CHLMYD TRACH DNA AMP PROBE: CPT | Performed by: FAMILY MEDICINE

## 2023-09-07 PROCEDURE — 87591 N.GONORRHOEAE DNA AMP PROB: CPT | Performed by: FAMILY MEDICINE

## 2023-09-07 RX ORDER — TIZANIDINE 2 MG/1
1-2 TABLET ORAL 2 TIMES DAILY
Qty: 60 TABLET | Refills: 5 | Status: SHIPPED | OUTPATIENT
Start: 2023-09-07 | End: 2024-02-20

## 2023-09-07 NOTE — TELEPHONE ENCOUNTER
Spoke with Mary and provided Dr Sandoval's recommendation. She would like to try tizanidine.    Vidya Weinberg RN

## 2023-09-07 NOTE — TELEPHONE ENCOUNTER
I recommend she stop baclofen     She should take a week to recover     If she would like to try tizanidine this is another muscle relaxant that can be helpful for many patients  Darby Sandoval MD on 9/6/2023 at 9:27 PM

## 2023-09-08 LAB
C TRACH DNA SPEC QL PROBE+SIG AMP: NEGATIVE
HBV SURFACE AG SERPL QL IA: NONREACTIVE
HIV 1+2 AB+HIV1 P24 AG SERPL QL IA: NONREACTIVE
N GONORRHOEA DNA SPEC QL NAA+PROBE: NEGATIVE
T PALLIDUM AB SER QL: NONREACTIVE

## 2023-09-15 ENCOUNTER — THERAPY VISIT (OUTPATIENT)
Dept: PHYSICAL THERAPY | Facility: CLINIC | Age: 33
End: 2023-09-15
Attending: PSYCHIATRY & NEUROLOGY
Payer: COMMERCIAL

## 2023-09-15 DIAGNOSIS — R25.2 SPASTICITY: ICD-10-CM

## 2023-09-15 DIAGNOSIS — G35 MS (MULTIPLE SCLEROSIS) (H): Primary | ICD-10-CM

## 2023-09-15 DIAGNOSIS — G82.20 PARAPARESIS (H): ICD-10-CM

## 2023-09-15 PROCEDURE — 97110 THERAPEUTIC EXERCISES: CPT | Mod: GP

## 2023-09-15 PROCEDURE — 97530 THERAPEUTIC ACTIVITIES: CPT | Mod: GP

## 2023-10-09 ENCOUNTER — THERAPY VISIT (OUTPATIENT)
Dept: PHYSICAL THERAPY | Facility: CLINIC | Age: 33
End: 2023-10-09
Attending: PSYCHIATRY & NEUROLOGY
Payer: COMMERCIAL

## 2023-10-09 DIAGNOSIS — G82.20 PARAPARESIS (H): ICD-10-CM

## 2023-10-09 DIAGNOSIS — G35 MS (MULTIPLE SCLEROSIS) (H): Primary | ICD-10-CM

## 2023-10-09 PROCEDURE — 97110 THERAPEUTIC EXERCISES: CPT | Mod: GP

## 2023-10-09 PROCEDURE — 97116 GAIT TRAINING THERAPY: CPT | Mod: GP

## 2023-10-09 PROCEDURE — 97530 THERAPEUTIC ACTIVITIES: CPT | Mod: GP

## 2023-10-09 NOTE — PATIENT INSTRUCTIONS
Call [facility] to inquire about coverage for the Sa[employer] Step, and possibly for 2!    Perform new HEP as able; if you need to use your energy elsewhere, though, that's okay. Do what you need to do!

## 2023-10-16 ENCOUNTER — THERAPY VISIT (OUTPATIENT)
Dept: PHYSICAL THERAPY | Facility: CLINIC | Age: 33
End: 2023-10-16
Attending: PSYCHIATRY & NEUROLOGY
Payer: COMMERCIAL

## 2023-10-16 DIAGNOSIS — G35 MS (MULTIPLE SCLEROSIS) (H): Primary | ICD-10-CM

## 2023-10-16 PROCEDURE — 97116 GAIT TRAINING THERAPY: CPT | Mod: GP

## 2023-10-16 PROCEDURE — 97530 THERAPEUTIC ACTIVITIES: CPT | Mod: GP

## 2023-10-23 ENCOUNTER — THERAPY VISIT (OUTPATIENT)
Dept: PHYSICAL THERAPY | Facility: CLINIC | Age: 33
End: 2023-10-23
Attending: PSYCHIATRY & NEUROLOGY
Payer: COMMERCIAL

## 2023-10-23 DIAGNOSIS — G35 MS (MULTIPLE SCLEROSIS) (H): Primary | ICD-10-CM

## 2023-10-23 PROCEDURE — 97530 THERAPEUTIC ACTIVITIES: CPT | Mod: GP

## 2023-10-23 PROCEDURE — 97112 NEUROMUSCULAR REEDUCATION: CPT | Mod: GP

## 2023-10-23 PROCEDURE — 97116 GAIT TRAINING THERAPY: CPT | Mod: GP

## 2023-10-30 ENCOUNTER — THERAPY VISIT (OUTPATIENT)
Dept: PHYSICAL THERAPY | Facility: CLINIC | Age: 33
End: 2023-10-30
Attending: PSYCHIATRY & NEUROLOGY
Payer: COMMERCIAL

## 2023-10-30 DIAGNOSIS — G35 MS (MULTIPLE SCLEROSIS) (H): Primary | ICD-10-CM

## 2023-10-30 PROCEDURE — 97116 GAIT TRAINING THERAPY: CPT | Mod: GP

## 2023-10-30 PROCEDURE — 97530 THERAPEUTIC ACTIVITIES: CPT | Mod: GP

## 2023-10-30 PROCEDURE — 97112 NEUROMUSCULAR REEDUCATION: CPT | Mod: GP

## 2023-10-30 NOTE — PROGRESS NOTES
10/30/23 0500   Appointment Info   Signing clinician's name / credentials Nestor Bailey DPT   Visits Used [address] [place] [address]   Medical Diagnosis MS (multiple sclerosis) (H) (G35)   PT Tx Diagnosis Fractionated movement deficit   Progress Note/Certification   Start of Care Date 08/18/23   Onset of illness/injury or Date of Surgery 06/02/08   Therapy Frequency 1x/week   Predicted Duration 90 days   Certification date from 10/30/23   Certification date to 01/27/24   GOALS   PT Goals 2;3;4   PT Goal 1   Goal Identifier HEP   Goal Description Pt will perform HEP with IND progressions and regressions to help manage symptoms long term.   Rationale to maximize safety and independence within the community   Goal Progress Ongoing: 10/30 - pt reports continuing to perform HEP at home. HEP issued   Target Date 01/27/24   PT Goal 2   Goal Identifier Static balance   Goal Description Pt will perform eyes closed Romberg stand with min sway for at least 30 sec and eyes closed foam pad stand for 30 sec (no sway limitations) to demonstrate improved balance without visual input.   Rationale to maximize safety and independence within the home   Goal Progress Ongoing: 10/30 - #2 for 30 sec and #5 for 30 sec, noting min-moderate sway during assessment. Eval: mCTSIB #2 (30 sec max sway), #4 (22 sec max sway, writer support to prevent LOB).   Target Date 01/27/24   PT Goal 3   Goal Identifier Falls   Goal Description Pt will report fewer than 1 fall per month to demonstate improved safety at home and in the community.   Rationale to maximize safety and independence within the home;to maximize safety and independence within the community   Goal Progress Ongoing: 10/30 - pt reports 4-5 falls/week. Eval: pt reports about 1 fall per week   Target Date 01/27/24   PT Goal 4   Goal Identifier 6MWT   Goal Description Pt will show increase of at least 258 ft (1,548 total) to demonstrate improved activity tolerance.   Rationale to maximize safety and  independence within the community   Goal Progress Ongoing: 10/30 - 1228 ft, no AD, no rest breaks. Eval: 1,290 ft   Target Date 01/27/24   Subjective Report   Subjective Report Pt reports she was able to attend a party recently, had a bad fall but reports no injury from the fall. Reports having 4+ falls/week. Pt reports continuing to perform ex at home. Reports nutrition has been the same. Going to see her neurologist 11/17, has discussed obtaining order for AFOs but reports she will have to see her neurologist for a F2F for the AFO orders.   Objective Measures   Objective Measures Objective Measure 1;Objective Measure 2   Objective Measure 1   Objective Measure 6MWT   Details 1228 ft, no AD, no LOB, no rest breaks.   Objective Measure 2   Objective Measure mCTSIB   Details Only performed #2 and #5. #2) 30 sec after initially needing to catch // bar at 8 sec. #5) 30 sec after initial LOB post/L at 4 sec needing to catch // bar for support.   Therapeutic Activity   Therapeutic Activities: dynamic activities to improve functional performance minutes (01604) 16   Ther Act 1 Care planning and education   Ther Act 1 - Details Discussed current plan re: medical management of MS, changing neuro sx, and scheduling therapy. Pt reporting she has to see MD face to face for MD to place B AFO order.   Neuromuscular Re-education   Neuromuscular re-ed of mvmt, balance, coord, kinesthetic sense, posture, proprioception minutes (91262) 15   Neuro Re-ed 1 Standing static balance   Neuro Re-ed 1 - Details SLS in // bars - started light hand assist on // bar for support, 3 sec hold x 8 RLE and 15 sec hold x2 LLE. Transitioned to standing with light assist reaching up on wall for support, 15 sec x 3 LLE and 5 sec x5 RLE. Improved core activation and stance time. Added these ex to HEP on PTRx.   PTRx Neuro Re-ed 1 Outcome Measure   PTRx Neuro Re-ed 1 - Details mCTSIB - see above   Gait Training   Gait Training Minutes, includes stair  climbing (48312) 10   Gait 1 Outcome Measures   Gait 1 - Details 6MWT, see above, rest break post.   Education   Learner/Method Patient   Education Comments see above   Plan   Home program PTRx, cycling, dumbbells in her car for her BUE   Updates to plan of care Added balance ex to PTRx HEP   Plan for next session Plan from MD re: MS (med changes?), order in for B AFOs? Stretching BLE and trunk with overpressure, amb on TM with B AFOs for endurance training, and standing static/dynamic balance   Comments   Comments Pt seems to be about the same in re: to her gait and gait mechanics, slightly worse on 6MWT but not significantly changed since initial eval. Reports new sx re: her MS, working with neuro team to manage. Pt would like to pursue B AFOs, not sure if she will always wear them but reports they do help her feel better when she walks longer distances and makes her balance feel more stable. Focusing on dynamic and static balance to reduce risks of falls, but pt reports she has been falling more often since eval (4-5x/week at least now vs 1x/week at eval), so need to continue to monitor neuro presentation while improving balance and keeping an eye on nutrition and mental health in re: to their contribution to her overall movement and health.   Total Session Time   Timed Code Treatment Minutes 41   Total Treatment Time (sum of timed and untimed services) 41         [facility]                                                                                   OUTPATIENT PHYSICAL THERAPY    PLAN OF TREATMENT FOR OUTPATIENT REHABILITATION   Patient's Last Name, First Name, Mary Weston YOB: 1990   Provider's Name   [facility]   Medical Record No.  [id number]     Onset Date: 06/02/08  Start of Care Date: 08/18/23     Medical Diagnosis:  MS (multiple sclerosis) (H) (G35)      PT Treatment Diagnosis:  Fractionated movement deficit  Plan of Treatment  Frequency/Duration: 1x/week/ 90 days    Certification date from 10/30/23 to 01/27/24         See note for plan of treatment details and functional goals     [place] Bailey, PT                         I CERTIFY THE NEED FOR THESE SERVICES FURNISHED UNDER        THIS PLAN OF TREATMENT AND WHILE UNDER MY CARE     (Physician attestation of this document indicates review and certification of the therapy plan).                Referring Provider:  Darby Sandoval      Initial Assessment  See Epic Evaluation- Start of Care Date: 08/18/23

## 2023-10-31 ENCOUNTER — TELEPHONE (OUTPATIENT)
Dept: NEUROLOGY | Facility: CLINIC | Age: 33
End: 2023-10-31
Payer: COMMERCIAL

## 2023-10-31 DIAGNOSIS — G82.20 PARAPARESIS (H): ICD-10-CM

## 2023-10-31 DIAGNOSIS — G35 MS (MULTIPLE SCLEROSIS) (H): Primary | ICD-10-CM

## 2023-10-31 NOTE — TELEPHONE ENCOUNTER
----- Message from [place] Bailey, PT sent at 10/23/2023 11:10 AM CDT -----  Regarding: B AFOs  Hi Dr. Sandoval,  My name is Nestor and I'm the PT working with Mary. Nice to meet you!    Could you please put in an orthotics referral for B carbon fiber AFOs when you have time? They've definitely helped keep Mary's R>L foot up while she's walking, especially over longer distances, so going forward, I think they'd be a good tool for her to have.    Thanks!    Nestor Johnson

## 2023-11-01 ENCOUNTER — INFUSION THERAPY VISIT (OUTPATIENT)
Dept: INFUSION THERAPY | Facility: CLINIC | Age: 33
End: 2023-11-01
Attending: PSYCHIATRY & NEUROLOGY
Payer: COMMERCIAL

## 2023-11-01 ENCOUNTER — OFFICE VISIT (OUTPATIENT)
Dept: NEUROLOGY | Facility: CLINIC | Age: 33
End: 2023-11-01
Attending: PSYCHIATRY & NEUROLOGY
Payer: COMMERCIAL

## 2023-11-01 VITALS
TEMPERATURE: 98.1 F | DIASTOLIC BLOOD PRESSURE: 82 MMHG | SYSTOLIC BLOOD PRESSURE: 111 MMHG | OXYGEN SATURATION: 97 % | HEART RATE: 82 BPM

## 2023-11-01 VITALS — SYSTOLIC BLOOD PRESSURE: 133 MMHG | DIASTOLIC BLOOD PRESSURE: 81 MMHG | HEART RATE: 99 BPM | OXYGEN SATURATION: 98 %

## 2023-11-01 DIAGNOSIS — G35 MS (MULTIPLE SCLEROSIS) (H): Primary | ICD-10-CM

## 2023-11-01 DIAGNOSIS — Z51.81 THERAPEUTIC DRUG MONITORING: ICD-10-CM

## 2023-11-01 DIAGNOSIS — G82.20 PARAPARESIS (H): ICD-10-CM

## 2023-11-01 DIAGNOSIS — R25.2 SPASTICITY: ICD-10-CM

## 2023-11-01 LAB
BASOPHILS # BLD AUTO: 0 10E3/UL (ref 0–0.2)
BASOPHILS NFR BLD AUTO: 1 %
CD19 B CELL COMMENT: ABNORMAL
CD19 CELLS # BLD: 1 CELLS/UL (ref 107–698)
CD19 CELLS NFR BLD: <1 % (ref 6–27)
EOSINOPHIL # BLD AUTO: 0 10E3/UL (ref 0–0.7)
EOSINOPHIL NFR BLD AUTO: 1 %
ERYTHROCYTE [DISTWIDTH] IN BLOOD BY AUTOMATED COUNT: 13.6 % (ref 10–15)
HCT VFR BLD AUTO: 42.5 % (ref 35–47)
HGB BLD-MCNC: 14.4 G/DL (ref 11.7–15.7)
IGG SERPL-MCNC: 802 MG/DL (ref 610–1616)
IMM GRANULOCYTES # BLD: 0 10E3/UL
IMM GRANULOCYTES NFR BLD: 0 %
LYMPHOCYTES # BLD AUTO: 1 10E3/UL (ref 0.8–5.3)
LYMPHOCYTES NFR BLD AUTO: 33 %
MCH RBC QN AUTO: 33.2 PG (ref 26.5–33)
MCHC RBC AUTO-ENTMCNC: 33.9 G/DL (ref 31.5–36.5)
MCV RBC AUTO: 98 FL (ref 78–100)
MONOCYTES # BLD AUTO: 0.3 10E3/UL (ref 0–1.3)
MONOCYTES NFR BLD AUTO: 9 %
NEUTROPHILS # BLD AUTO: 1.7 10E3/UL (ref 1.6–8.3)
NEUTROPHILS NFR BLD AUTO: 56 %
NRBC # BLD AUTO: 0 10E3/UL
NRBC BLD AUTO-RTO: 0 /100
PLATELET # BLD AUTO: 232 10E3/UL (ref 150–450)
RBC # BLD AUTO: 4.34 10E6/UL (ref 3.8–5.2)
WBC # BLD AUTO: 3.1 10E3/UL (ref 4–11)

## 2023-11-01 PROCEDURE — 96375 TX/PRO/DX INJ NEW DRUG ADDON: CPT

## 2023-11-01 PROCEDURE — 99214 OFFICE O/P EST MOD 30 MIN: CPT | Performed by: PSYCHIATRY & NEUROLOGY

## 2023-11-01 PROCEDURE — 258N000003 HC RX IP 258 OP 636: Performed by: PSYCHIATRY & NEUROLOGY

## 2023-11-01 PROCEDURE — 82565 ASSAY OF CREATININE: CPT

## 2023-11-01 PROCEDURE — 250N000011 HC RX IP 250 OP 636: Mod: JZ | Performed by: PSYCHIATRY & NEUROLOGY

## 2023-11-01 PROCEDURE — 82784 ASSAY IGA/IGD/IGG/IGM EACH: CPT

## 2023-11-01 PROCEDURE — G0463 HOSPITAL OUTPT CLINIC VISIT: HCPCS | Performed by: PSYCHIATRY & NEUROLOGY

## 2023-11-01 PROCEDURE — 96365 THER/PROPH/DIAG IV INF INIT: CPT

## 2023-11-01 PROCEDURE — 36415 COLL VENOUS BLD VENIPUNCTURE: CPT

## 2023-11-01 PROCEDURE — 250N000013 HC RX MED GY IP 250 OP 250 PS 637: Performed by: PSYCHIATRY & NEUROLOGY

## 2023-11-01 PROCEDURE — 96366 THER/PROPH/DIAG IV INF ADDON: CPT

## 2023-11-01 PROCEDURE — 85025 COMPLETE CBC W/AUTO DIFF WBC: CPT

## 2023-11-01 PROCEDURE — 86355 B CELLS TOTAL COUNT: CPT

## 2023-11-01 RX ORDER — ACETAMINOPHEN 325 MG/1
650 TABLET ORAL ONCE
Status: CANCELLED | OUTPATIENT
Start: 2024-04-28

## 2023-11-01 RX ORDER — ALBUTEROL SULFATE 90 UG/1
1-2 AEROSOL, METERED RESPIRATORY (INHALATION)
Status: DISCONTINUED | OUTPATIENT
Start: 2023-11-01 | End: 2023-11-01 | Stop reason: HOSPADM

## 2023-11-01 RX ORDER — DIPHENHYDRAMINE HYDROCHLORIDE 50 MG/ML
50 INJECTION INTRAMUSCULAR; INTRAVENOUS
Status: CANCELLED
Start: 2024-04-28

## 2023-11-01 RX ORDER — EPINEPHRINE 1 MG/ML
0.3 INJECTION, SOLUTION INTRAMUSCULAR; SUBCUTANEOUS EVERY 5 MIN PRN
Status: DISCONTINUED | OUTPATIENT
Start: 2023-11-01 | End: 2023-11-01 | Stop reason: HOSPADM

## 2023-11-01 RX ORDER — DIPHENHYDRAMINE HYDROCHLORIDE 50 MG/ML
50 INJECTION INTRAMUSCULAR; INTRAVENOUS
Status: DISCONTINUED | OUTPATIENT
Start: 2023-11-01 | End: 2023-11-01 | Stop reason: HOSPADM

## 2023-11-01 RX ORDER — ALBUTEROL SULFATE 0.83 MG/ML
2.5 SOLUTION RESPIRATORY (INHALATION)
Status: DISCONTINUED | OUTPATIENT
Start: 2023-11-01 | End: 2023-11-01 | Stop reason: HOSPADM

## 2023-11-01 RX ORDER — HEPARIN SODIUM,PORCINE 10 UNIT/ML
5 VIAL (ML) INTRAVENOUS
Status: CANCELLED | OUTPATIENT
Start: 2024-04-28

## 2023-11-01 RX ORDER — DALFAMPRIDINE 10 MG/1
10 TABLET, FILM COATED, EXTENDED RELEASE ORAL 2 TIMES DAILY
Qty: 60 TABLET | Refills: 11 | Status: SHIPPED | OUTPATIENT
Start: 2023-11-01

## 2023-11-01 RX ORDER — ALBUTEROL SULFATE 0.83 MG/ML
2.5 SOLUTION RESPIRATORY (INHALATION)
Status: CANCELLED | OUTPATIENT
Start: 2024-04-28

## 2023-11-01 RX ORDER — DIPHENHYDRAMINE HCL 50 MG
50 CAPSULE ORAL ONCE
Status: CANCELLED | OUTPATIENT
Start: 2024-04-28

## 2023-11-01 RX ORDER — PRAMIPEXOLE DIHYDROCHLORIDE 0.75 MG/1
1 TABLET ORAL
COMMUNITY
Start: 2023-10-26 | End: 2024-02-12

## 2023-11-01 RX ORDER — METHYLPREDNISOLONE SODIUM SUCCINATE 125 MG/2ML
125 INJECTION, POWDER, LYOPHILIZED, FOR SOLUTION INTRAMUSCULAR; INTRAVENOUS ONCE
Status: CANCELLED | OUTPATIENT
Start: 2024-04-28

## 2023-11-01 RX ORDER — LAMOTRIGINE 25 MG/1
25 TABLET ORAL DAILY
COMMUNITY
End: 2024-02-20

## 2023-11-01 RX ORDER — ACETAMINOPHEN 325 MG/1
650 TABLET ORAL ONCE
Status: COMPLETED | OUTPATIENT
Start: 2023-11-01 | End: 2023-11-01

## 2023-11-01 RX ORDER — HEPARIN SODIUM (PORCINE) LOCK FLUSH IV SOLN 100 UNIT/ML 100 UNIT/ML
5 SOLUTION INTRAVENOUS
Status: CANCELLED | OUTPATIENT
Start: 2024-04-28

## 2023-11-01 RX ORDER — DIPHENHYDRAMINE HCL 50 MG
50 CAPSULE ORAL ONCE
Status: COMPLETED | OUTPATIENT
Start: 2023-11-01 | End: 2023-11-01

## 2023-11-01 RX ORDER — METHYLPREDNISOLONE SODIUM SUCCINATE 125 MG/2ML
125 INJECTION, POWDER, LYOPHILIZED, FOR SOLUTION INTRAMUSCULAR; INTRAVENOUS
Status: DISCONTINUED | OUTPATIENT
Start: 2023-11-01 | End: 2023-11-01 | Stop reason: HOSPADM

## 2023-11-01 RX ORDER — METHYLPREDNISOLONE SODIUM SUCCINATE 125 MG/2ML
125 INJECTION, POWDER, LYOPHILIZED, FOR SOLUTION INTRAMUSCULAR; INTRAVENOUS
Status: CANCELLED
Start: 2024-04-28

## 2023-11-01 RX ORDER — METHYLPREDNISOLONE SODIUM SUCCINATE 125 MG/2ML
125 INJECTION, POWDER, LYOPHILIZED, FOR SOLUTION INTRAMUSCULAR; INTRAVENOUS ONCE
Status: COMPLETED | OUTPATIENT
Start: 2023-11-01 | End: 2023-11-01

## 2023-11-01 RX ORDER — MEPERIDINE HYDROCHLORIDE 50 MG/ML
25 INJECTION INTRAMUSCULAR; INTRAVENOUS; SUBCUTANEOUS EVERY 30 MIN PRN
Status: DISCONTINUED | OUTPATIENT
Start: 2023-11-01 | End: 2023-11-01 | Stop reason: HOSPADM

## 2023-11-01 RX ORDER — ALBUTEROL SULFATE 90 UG/1
1-2 AEROSOL, METERED RESPIRATORY (INHALATION)
Status: CANCELLED
Start: 2024-04-28

## 2023-11-01 RX ORDER — EPINEPHRINE 1 MG/ML
0.3 INJECTION, SOLUTION INTRAMUSCULAR; SUBCUTANEOUS EVERY 5 MIN PRN
Status: CANCELLED | OUTPATIENT
Start: 2024-04-28

## 2023-11-01 RX ORDER — MEPERIDINE HYDROCHLORIDE 50 MG/ML
25 INJECTION INTRAMUSCULAR; INTRAVENOUS; SUBCUTANEOUS EVERY 30 MIN PRN
Status: CANCELLED | OUTPATIENT
Start: 2024-04-28

## 2023-11-01 RX ADMIN — ACETAMINOPHEN 650 MG: 325 TABLET ORAL at 08:19

## 2023-11-01 RX ADMIN — SODIUM CHLORIDE 250 ML: 9 INJECTION, SOLUTION INTRAVENOUS at 08:23

## 2023-11-01 RX ADMIN — OCRELIZUMAB 600 MG: 300 INJECTION INTRAVENOUS at 08:53

## 2023-11-01 RX ADMIN — DIPHENHYDRAMINE HYDROCHLORIDE 50 MG: 50 CAPSULE ORAL at 08:20

## 2023-11-01 RX ADMIN — METHYLPREDNISOLONE SODIUM SUCCINATE 125 MG: 125 INJECTION, POWDER, FOR SOLUTION INTRAMUSCULAR; INTRAVENOUS at 08:27

## 2023-11-01 ASSESSMENT — PAIN SCALES - GENERAL: PAINLEVEL: MODERATE PAIN (4)

## 2023-11-01 NOTE — PATIENT INSTRUCTIONS
Have a vitamin B6 level drawn   High levels can contribute to imbalance    Your symptoms could be related to everything you have going on     Update MRI     Try tizanidine again - stop if you feel more unsteady after taking     Lamotrigine can eventually help with some of the spasms    Try dalfampridine  This medication can help you walk a bit better and farther   Stop if no benefit after 1 month     Ask PT about a walking pole    Follow up in 3 months

## 2023-11-01 NOTE — LETTER
11/1/2023       RE: Mary Evans  [address] [place] [address]     Dear Colleague,    Thank you for referring your patient, Mary Evans, to the [facility] [place] at [facility]. Please see a copy of my visit note below.    Date of Service: 11/1/2023    [facility] Neurology   MS Clinic Evaluation    Subjective: 32 y/o woman who presents for evaluation of MS     She presents today for expedited follow-up.  Clinically she is getting worse.  She reports that over the past 3 months her balance has been declining.  She is falling more often and is often times bumping into things.  Falls most commonly occur because she trips over her toe, has difficulty going up the stairs, and will have to carry her 40 pound 2-year-old daughter.    Symptoms have worsened in the past couple weeks.  She is having difficulty getting out of bed because she is so stiff in the morning.  Try to stretch without benefit.    She has had difficulty sleeping.  She has experienced restless legs symptoms.  She is taking pramipexole and has found this helpful.    She has had some bowel incontinence, though does acknowledge that she is taking magnesium.    If she touches hot, cold or has a painful stimuli in her legs she will suffer a severe spasm.    She has been under considerable stress.  He is caring for her children and is undergoing a divorce.  Her ex is no longer living with her.  She does feel safe at home.  She does experience episodic suicidal ideation.  She has called the suicidal hotline and has found this helpful.  She also follows with a therapist.  She has been advised to start lamotrigine.    She did receive Ocrevus today.  She tolerated the infusion without substantial side effects.    She is taking vitamin D3 5000 international unit(s) daily.     Disease onset: age 17, cervical sensory myelitis, R ON   LR uncertain     DMD hx:   Avonex  2008, brief, suffered severe flu side effects  betaseron 2008 x several months, radiologic progression   Copaxone several years, interrupted for pregnancy  Copaxone 40 mg - developed hives  Gilenya 2016 - discontinued for pregnancy  Copaxone 20 mg consistently [id number], radiologic progression   tecfidera 2019   ocrevus 2019-present, LD 11/1/2023       No Known Allergies    Current Outpatient Medications   Medication    [START ON 1/15/2024] diazepam (VALIUM) 5 MG tablet    modafinil (PROVIGIL) 100 MG tablet    ocrelizumab 600 mg    oxybutynin (DITROPAN) 5 MG tablet    pramipexole (MIRAPEX) 0.75 MG tablet    tiZANidine (ZANAFLEX) 2 MG tablet    venlafaxine (EFFEXOR XR) 75 MG 24 hr capsule    dalfampridine (AMPYRA) 10 MG TB12 12 hr tablet    PNV62/FA/OM3/DHA/EPA/FISH OIL (PRENATAL GUMMY ORAL)    valACYclovir (VALTREX) 1000 MG tablet     No current facility-administered medications for this visit.        Past medical, surgical, social and family history was personally reviewed. Pertinent details noted above.     Physical Examination:   There were no vitals taken for this visit.    General: no acute distress  Cranial nerves:   EOM full w/no LUIS CARLOS   Face symmetric  Hearing intact  No dysarthria   Motor:   Tone is mildly increased in the lower extremities  Bulk is normal     R L  Deltoid  5 5  Biceps  5 5  Triceps 5 5  Wrist ext 5 5  Finger ext 5 5  Finger abd 5 5    Hip flexion 4* 4+ *uses accessory muscles  Knee flexion 4+ 5  Knee ext 5 5  Ankle d/f 4+ 5    Reflexes: 1+ and symmetric throughout, babinski absent bilaterally  Sensory: vibration is severely reduced in the ankles, mod in the knees  Coordination: no ataxia or dysmetria  Gait: spastic LLE sl wide based  25 foot walk 9.07 sec    Tests/Imaging:     Vitamin D 57.7  JCV Ab neg    ALC 1000 -> 1000  Igg 749 -> 802    MRI Brain  2019 - low lesion burden, multiple small pvl, few jcl    MRI Cervical spine   2018 - study limited by motion artifact, multiple eccentric  cord lesions in upper cervical cord with large lesion dorsal cord c2    MRI Thoracic spine   2019 - images done, but no t2 images available for personal review    Assessment: 34 y/o woman with relapsing remitting MS. she has experienced a clinical decline.  She is due for radiologic surveillance.  I recommended she update this now.    We discussed the possibility of starting a medication that will impact the T cells as well.  This would be advised if we noticed any new lesions on MRI.    She does drink multiple energy drinks.  I recommended checking a vitamin B6 level.  We discussed how toxicity can cause some of her symptoms.    She would benefit from a trial of dalfampridine given her gait impairment.  Risks and benefits were discussed in detail.    She has appropriately been advised to trial AFOs.  I encouraged her to ask her physical therapist if she would benefit from a walking stick as well.  She was encouraged to consider moving to a home without stairs.    We did discuss how the risk of suicide is greater and multiple sclerosis.  She is encouraged to utilize the suicide hotline if symptoms recur.  She will continue care with psychiatry and her therapist.    Finally, I recommended she try tizanidine again, as it seems that her clinical decline has been irrespective of her tizanidine trial.    Plan:   -Vitamin B6 level  - Resume tizanidine  - Dalfampridine trial  - MRI  - Follow-up in 3 months    Note was completed with the assistance of Dragon Fluency software which can often result in accidental word substitutions.     30 minutes spent in the care of this patient on the date of service.  Darby Sandoval MD on 11/1/2023 at 2:09 PM          Again, thank you for allowing me to participate in the care of your patient.      Sincerely,    Darby Sandoval MD

## 2023-11-01 NOTE — PROGRESS NOTES
Infusion Nursing Note:  Mary Evans presents today for Ocrevus infusion.       Note: Pt states that she is having an exacerbation which was also observed with her ambulation taking extra effort and noticeable muscle weakness.        Intravenous Access:  Peripheral IV placed.     Treatment Conditions:  Biological Infusion Checklist:  ~~~ NOTE: If the patient answers yes to any of the questions below, hold the infusion and contact ordering provider or on-call provider.    Have you recently had an elevated temperature, fever, chills, productive cough, coughing for 3 weeks or longer or hemoptysis,  abnormal vital signs, night sweats,  chest pain or have you noticed a decrease in your appetite, unexplained weight loss or fatigue? No  Do you have any open wounds or new incisions? No  Do you have any upcoming hospitalizations or surgeries? Does not include esophagogastroduodenoscopy, colonoscopy, endoscopic retrograde cholangiopancreatography (ERCP), endoscopic ultrasound (EUS), dental procedures or joint aspiration/steroid injections No  Do you currently have any signs of illness or infection or are you on any antibiotics? No  Have you had any new, sudden or worsening abdominal pain? No  Have you or anyone in your household received a live vaccination in the past 4 weeks? Please note: No live vaccines while on biologic/chemotherapy until 6 months after the last treatment. Patient can receive the flu vaccine (shot only), pneumovax and the Covid vaccine. It is optimal for the patient to get these vaccines mid cycle, but they can be given at any time as long as it is not on the day of the infusion. No  Have you recently been diagnosed with any new nervous system diseases (ie. Multiple sclerosis, Guillain [place], seizures, neurological changes) or cancer diagnosis? Are you on any form of radiation or chemotherapy? No  Are you pregnant or breast feeding or do you have plans of pregnancy in the future? No  Have you been  having any signs of worsening depression or suicidal ideations?  (benlysta only) No  Have there been any other new onset medical symptoms? No  Have you had any new blood clots? (IVIG only) No        Post Infusion Assessment:  Patient tolerated first maintainence dose without incident.         Discharge Plan:   Patient discharged in stable condition accompanied by: ryan GIBBS RN

## 2023-11-01 NOTE — NURSING NOTE
Chief Complaint   Patient presents with    MS    RECHECK     Ms follow up      Vitals were taken and medications were reconciled.   Son Gonzalez, EMT  2:51 PM

## 2023-11-01 NOTE — PROGRESS NOTES
Date of Service: 11/1/2023    [facility] Neurology   MS Clinic Evaluation    Subjective: 34 y/o woman who presents for evaluation of MS     She presents today for expedited follow-up.  Clinically she is getting worse.  She reports that over the past 3 months her balance has been declining.  She is falling more often and is often times bumping into things.  Falls most commonly occur because she trips over her toe, has difficulty going up the stairs, and will have to carry her 40 pound 2-year-old daughter.    Symptoms have worsened in the past couple weeks.  She is having difficulty getting out of bed because she is so stiff in the morning.  Try to stretch without benefit.    She has had difficulty sleeping.  She has experienced restless legs symptoms.  She is taking pramipexole and has found this helpful.    She has had some bowel incontinence, though does acknowledge that she is taking magnesium.    If she touches hot, cold or has a painful stimuli in her legs she will suffer a severe spasm.    She has been under considerable stress.  He is caring for her children and is undergoing a divorce.  Her ex is no longer living with her.  She does feel safe at home.  She does experience episodic suicidal ideation.  She has called the suicidal hotline and has found this helpful.  She also follows with a therapist.  She has been advised to start lamotrigine.    She did receive Ocrevus today.  She tolerated the infusion without substantial side effects.    She is taking vitamin D3 5000 international unit(s) daily.     Disease onset: age 17, cervical sensory myelitis, R ON   LR uncertain     DMD hx:   Avonex 2008, brief, suffered severe flu side effects  betaseron 2008 x several months, radiologic progression   Copaxone several years, interrupted for pregnancy  Copaxone 40 mg - developed hives  Gilenya 2016 - discontinued for pregnancy  Copaxone 20 mg consistently [id number], radiologic progression   tecfidera 2019   ocrevus  2019-present, LD 11/1/2023       No Known Allergies    Current Outpatient Medications   Medication    [START ON 1/15/2024] diazepam (VALIUM) 5 MG tablet    modafinil (PROVIGIL) 100 MG tablet    ocrelizumab 600 mg    oxybutynin (DITROPAN) 5 MG tablet    pramipexole (MIRAPEX) 0.75 MG tablet    tiZANidine (ZANAFLEX) 2 MG tablet    venlafaxine (EFFEXOR XR) 75 MG 24 hr capsule    dalfampridine (AMPYRA) 10 MG TB12 12 hr tablet    PNV62/FA/OM3/DHA/EPA/FISH OIL (PRENATAL GUMMY ORAL)    valACYclovir (VALTREX) 1000 MG tablet     No current facility-administered medications for this visit.        Past medical, surgical, social and family history was personally reviewed. Pertinent details noted above.     Physical Examination:   There were no vitals taken for this visit.    General: no acute distress  Cranial nerves:   EOM full w/no LUIS CARLOS   Face symmetric  Hearing intact  No dysarthria   Motor:   Tone is mildly increased in the lower extremities  Bulk is normal     R L  Deltoid  5 5  Biceps  5 5  Triceps 5 5  Wrist ext 5 5  Finger ext 5 5  Finger abd 5 5    Hip flexion 4* 4+ *uses accessory muscles  Knee flexion 4+ 5  Knee ext 5 5  Ankle d/f 4+ 5    Reflexes: 1+ and symmetric throughout, babinski absent bilaterally  Sensory: vibration is severely reduced in the ankles, mod in the knees  Coordination: no ataxia or dysmetria  Gait: spastic LLE sl wide based  25 foot walk 9.07 sec    Tests/Imaging:     Vitamin D 57.7  JCV Ab neg    ALC 1000 -> 1000  Igg 749 -> 802    MRI Brain  2019 - low lesion burden, multiple small pvl, few jcl    MRI Cervical spine   2018 - study limited by motion artifact, multiple eccentric cord lesions in upper cervical cord with large lesion dorsal cord c2    MRI Thoracic spine   2019 - images done, but no t2 images available for personal review    Assessment: 32 y/o woman with relapsing remitting MS. she has experienced a clinical decline.  She is due for radiologic surveillance.  I recommended she update  this now.    We discussed the possibility of starting a medication that will impact the T cells as well.  This would be advised if we noticed any new lesions on MRI.    She does drink multiple energy drinks.  I recommended checking a vitamin B6 level.  We discussed how toxicity can cause some of her symptoms.    She would benefit from a trial of dalfampridine given her gait impairment.  Risks and benefits were discussed in detail.    She has appropriately been advised to trial AFOs.  I encouraged her to ask her physical therapist if she would benefit from a walking stick as well.  She was encouraged to consider moving to a home without stairs.    We did discuss how the risk of suicide is greater and multiple sclerosis.  She is encouraged to utilize the suicide hotline if symptoms recur.  She will continue care with psychiatry and her therapist.    Finally, I recommended she try tizanidine again, as it seems that her clinical decline has been irrespective of her tizanidine trial.    Plan:   -Vitamin B6 level  - Resume tizanidine  - Dalfampridine trial  - MRI  - Follow-up in 3 months    Note was completed with the assistance of Dragon Fluency software which can often result in accidental word substitutions.     30 minutes spent in the care of this patient on the date of service.  Darby Sandoval MD on 11/1/2023 at 2:09 PM

## 2023-11-01 NOTE — PROGRESS NOTES
Infusion Nursing Note:  Mary Evans presents today for ***.    Patient seen by provider today: {YES (EXPLAIN)/NO:[id number]}   present during visit today: {UMKAPILGE:524375}    Note: {Not Applicable or free text:057618:s}.      Intravenous Access:  {UMHIVACCESS:203820}    Treatment Conditions:  Biological Infusion Checklist:  ~~~ NOTE: If the patient answers yes to any of the questions below, hold the infusion and contact ordering provider or on-call provider.    Have you recently had an elevated temperature, fever, chills, productive cough, coughing for 3 weeks or longer or hemoptysis,  abnormal vital signs, night sweats,  chest pain or have you noticed a decrease in your appetite, unexplained weight loss or fatigue? No  Do you have any open wounds or new incisions? No  Do you have any upcoming hospitalizations or surgeries? Does not include esophagogastroduodenoscopy, colonoscopy, endoscopic retrograde cholangiopancreatography (ERCP), endoscopic ultrasound (EUS), dental procedures or joint aspiration/steroid injections No  Do you currently have any signs of illness or infection or are you on any antibiotics? No  Have you had any new, sudden or worsening abdominal pain? No  Have you or anyone in your household received a live vaccination in the past 4 weeks? Please note: No live vaccines while on biologic/chemotherapy until 6 months after the last treatment. Patient can receive the flu vaccine (shot only), pneumovax and the Covid vaccine. It is optimal for the patient to get these vaccines mid cycle, but they can be given at any time as long as it is not on the day of the infusion. No  Have you recently been diagnosed with any new nervous system diseases (ie. Multiple sclerosis, Guillain [place], seizures, neurological changes) or cancer diagnosis? Are you on any form of radiation or chemotherapy? No  Are you pregnant or breast feeding or do you have plans of pregnancy in the future? No  Have you been  having any signs of worsening depression or suicidal ideations?  (benlysta only) No  Have there been any other new onset medical symptoms? No  Have you had any new blood clots? (IVIG only) No      Post Infusion Assessment:  {UMHPOSTINFUSION:333221}       Discharge Plan:   {UMHDISCHARGE:024734}      Jazmyn Goel RN

## 2023-11-02 ENCOUNTER — TELEPHONE (OUTPATIENT)
Dept: NEUROLOGY | Facility: CLINIC | Age: 33
End: 2023-11-02
Payer: COMMERCIAL

## 2023-11-02 LAB
CREAT SERPL-MCNC: 0.58 MG/DL (ref 0.51–0.95)
EGFRCR SERPLBLD CKD-EPI 2021: >90 ML/MIN/1.73M2

## 2023-11-02 NOTE — TELEPHONE ENCOUNTER
PA Initiation    Medication: DALFAMPRIDINE ER 10 MG PO TB12  Insurance Company: [employer] - Phone [phone] Fax [phone]  Pharmacy Filling the Rx: [facility] - [place], [place] - [address]  Filling Pharmacy Phone:    Filling Pharmacy Fax:    Start Date: 11/2/2023          Thank you,    Romelia Guidry [facility]  Specialty Pharmacy Clinic Liaison - [facility] [address]  [address] [place] [address]  Ph: [phone] Fax: [phone]  [email or web address]

## 2023-11-03 NOTE — RESULT ENCOUNTER NOTE
Please notify pt that her b cells are appropriately low. Her blood count reveals a mildly low white blood cell count, but not to a concerning level. Kidney function is normal. She can try dalfampridine. Darby Sandoval MD

## 2023-11-06 NOTE — TELEPHONE ENCOUNTER
Prior Authorization Approval    Medication: DALFAMPRIDINE ER 10 MG PO TB12  Authorization Effective Date: 10/3/2023  Authorization Expiration Date: 11/1/2024  Approved Dose/Quantity: 30 days  Reference #:     Insurance Company: [employer] - Phone [phone] Fax [phone]  Expected CoPay: $ 1  CoPay Card Available: No    Financial Assistance Needed: N/A  Which Pharmacy is filling the prescription: [facility] - [place], [place] - [address]  Pharmacy Notified: Yes  Patient Notified: Yes          Thank you,    Romelia Guidry h-T  Specialty Pharmacy Clinic Liaison - [facility] [address] [address]  Ph: [phone] Fax: [phone]  [email or web address]

## 2023-11-17 ENCOUNTER — THERAPY VISIT (OUTPATIENT)
Dept: PHYSICAL THERAPY | Facility: CLINIC | Age: 33
End: 2023-11-17
Attending: PSYCHIATRY & NEUROLOGY
Payer: COMMERCIAL

## 2023-11-17 DIAGNOSIS — G35 MS (MULTIPLE SCLEROSIS) (H): Primary | ICD-10-CM

## 2023-11-17 PROCEDURE — 97116 GAIT TRAINING THERAPY: CPT | Mod: GP

## 2023-11-17 PROCEDURE — 97112 NEUROMUSCULAR REEDUCATION: CPT | Mod: GP

## 2023-11-17 PROCEDURE — 97530 THERAPEUTIC ACTIVITIES: CPT | Mod: GP

## 2023-11-22 ENCOUNTER — TELEPHONE (OUTPATIENT)
Dept: PHYSICAL THERAPY | Facility: CLINIC | Age: 33
End: 2023-11-22
Payer: COMMERCIAL

## 2023-12-01 DIAGNOSIS — G35 MS (MULTIPLE SCLEROSIS) (H): ICD-10-CM

## 2023-12-01 DIAGNOSIS — N31.9 NEUROGENIC BLADDER: ICD-10-CM

## 2023-12-01 RX ORDER — OXYBUTYNIN CHLORIDE 5 MG/1
5 TABLET ORAL 2 TIMES DAILY
Qty: 60 TABLET | Refills: 4 | Status: SHIPPED | OUTPATIENT
Start: 2023-12-01 | End: 2024-02-20

## 2023-12-01 NOTE — TELEPHONE ENCOUNTER
Received refill request for oxybutynin from [facility]; Patient was last seen in Nov 2023 and has follow up appointment in Feb 2024 with Dr Sandoval. Refilled per [facility] refill protocol.    Vidya Weinberg RN

## 2023-12-07 ENCOUNTER — TELEPHONE (OUTPATIENT)
Dept: PHYSICAL THERAPY | Facility: CLINIC | Age: 33
End: 2023-12-07
Payer: COMMERCIAL

## 2023-12-21 ENCOUNTER — THERAPY VISIT (OUTPATIENT)
Dept: PHYSICAL THERAPY | Facility: CLINIC | Age: 33
End: 2023-12-21
Attending: PSYCHIATRY & NEUROLOGY
Payer: COMMERCIAL

## 2023-12-21 DIAGNOSIS — R25.2 SPASTICITY: ICD-10-CM

## 2023-12-21 DIAGNOSIS — G82.20 PARAPARESIS (H): ICD-10-CM

## 2023-12-21 DIAGNOSIS — G35 MS (MULTIPLE SCLEROSIS) (H): Primary | ICD-10-CM

## 2023-12-21 PROCEDURE — 97530 THERAPEUTIC ACTIVITIES: CPT | Mod: GP

## 2023-12-21 PROCEDURE — 97112 NEUROMUSCULAR REEDUCATION: CPT | Mod: GP

## 2024-01-04 ENCOUNTER — THERAPY VISIT (OUTPATIENT)
Dept: PHYSICAL THERAPY | Facility: CLINIC | Age: 34
End: 2024-01-04
Attending: PSYCHIATRY & NEUROLOGY
Payer: COMMERCIAL

## 2024-01-04 DIAGNOSIS — G82.20 PARAPARESIS (H): ICD-10-CM

## 2024-01-04 DIAGNOSIS — G35 MS (MULTIPLE SCLEROSIS) (H): Primary | ICD-10-CM

## 2024-01-04 PROCEDURE — 97112 NEUROMUSCULAR REEDUCATION: CPT | Mod: GP

## 2024-01-04 PROCEDURE — 97110 THERAPEUTIC EXERCISES: CPT | Mod: GP

## 2024-01-04 PROCEDURE — 97530 THERAPEUTIC ACTIVITIES: CPT | Mod: GP

## 2024-01-04 NOTE — PROGRESS NOTES
01/04/24 0500   Appointment Info   Signing clinician's name / credentials Nestor Bailey DPT   Total/Authorized Visits 10   Visits Used [facility]   Medical Diagnosis MS (multiple sclerosis) (H) (G35)   PT Tx Diagnosis Fractionated movement deficit   Progress Note/Certification   Start of Care Date 08/18/23   Onset of illness/injury or Date of Surgery 06/02/08   Therapy Frequency 1x/week   Predicted Duration 90 days   Certification date from 10/30/23   Certification date to 01/27/24   GOALS   PT Goals 2;3;4   PT Goal 1   Goal Identifier HEP   Goal Description Pt will perform HEP with IND progressions and regressions to help manage symptoms long term.   Rationale to maximize safety and independence within the community   Goal Progress Ongoing: 10/30 - pt reports continuing to perform HEP at home. HEP issued   Target Date 01/27/24   PT Goal 2   Goal Identifier Static balance   Goal Description Pt will perform eyes closed Romberg stand with min sway for at least 30 sec and eyes closed foam pad stand for 30 sec (no sway limitations) to demonstrate improved balance without visual input.   Rationale to maximize safety and independence within the home   Goal Progress Ongoing: 10/30 - #2 for 30 sec and #5 for 30 sec, noting min-moderate sway during assessment. Eval: mCTSIB #2 (30 sec max sway), #4 (22 sec max sway, writer support to prevent LOB).   Target Date 01/27/24   PT Goal 3   Goal Identifier Falls   Goal Description Pt will report fewer than 1 fall per month to demonstate improved safety at home and in the community.   Rationale to maximize safety and independence within the home;to maximize safety and independence within the community   Goal Progress Ongoing: 10/30 - pt reports 4-5 falls/week. Eval: pt reports about 1 fall per week   Target Date 01/27/24   PT Goal 4   Goal Identifier 6MWT   Goal Description Pt will show increase of at least 258 ft (1,548 total) to demonstrate improved activity tolerance.    Rationale to maximize safety and independence within the community   Goal Progress Ongoing: 10/30 - 1228 ft, no AD, no rest breaks. Eval: 1,290 ft   Target Date 01/27/24   Subjective Report   Subjective Report Pt present with her dtr, Sary. Pt reports having a low key [place], glad holidays have passed, a few falls at home, no major injuries. Pt presents wearing B AFOs, reports they have been helping but she doesn't use them around the house. Received a Peloton bike for [place]. Pt plans to look into [facility] for electrolyte supplementation.   Therapeutic Procedure/Exercise   Therapeutic Procedures: strength, endurance, ROM, flexibillity minutes (30262) 15   Ther Proc 1 HEP   Ther Proc 1 - Details Reviewed HEP verbally and which ex pt has been performing; practiced yoga ex (Warrior 2, Tree with kickstand) and heel/toe raises on pillow for ankle stability with and without AFOs,   Therapeutic Activity   Therapeutic Activities: dynamic activities to improve functional performance minutes (88493) 10   Ther Act 1 Care planning and education   Ther Act 1 - Details Discussed Peloton setup, showed pt how the andrew works, and recommended certain types of exercise (cardio, strength training, yoga) and instructors to try at home, discussed how to clip into bike and how to setup to safely for repeated use.   Neuromuscular Re-education   Neuromuscular re-ed of mvmt, balance, coord, kinesthetic sense, posture, proprioception minutes (09175) 20   Neuro Re-ed 1 Dynamic balance standing   Neuro Re-ed 1 - Details Blaze Pods with dtr present for dynamic balance, tried to incorporate dtr into activity but dtr distractable (only 2 1/2 years old, some developmental delay). Had pods setup in a [place], initially all on the floor, then had half on floor and half on chairs. Pt carrying dtr and using foot to press floor pods and hand to press chair pods, no LOB, having to crouch low with no significantly limitations from spasticity.   PTRx  Neuro Re-ed 1 Standing static balance   PTRx Neuro Re-ed 1 - Details Heel/toe raises in // bars on rocker board with AFOs donned 12x, heel/toe raises on Bosu ball BUE support AFOs donned 8x, no LOB. Tried to incorporate dtr into activity but pt having to attend to dtrs needs (tube feeding, redirecting) intermittently throughout intervention.   Education   Learner/Method Patient   Education Comments Peloton use at home for part of HEP, dietary health including assist with finding electrolyte supplements   Plan   Home program PTRx, cycling on Peloton, dumbbells in her car for her BUE   Updates to plan of care Updated PTRx HEP   Plan for next session How have Peloton workouts been? mCTSIB, 6MWT, standing balance on balance board and Bosu in // bars, seated foot intrinsic and ankle strengthening   Total Session Time   Timed Code Treatment Minutes 45   Total Treatment Time (sum of timed and untimed services) 45         PLAN  Continue therapy per current plan of care. Focusing on long term management of spasticity (AFOs, lifestyle modifications and exercise focusing on balance) to maintain and progress Mary's balance to functionally be able to amb community distances and perform her duties as a mother. Has been hesitant to consistently use AFOs, does not use in house but does use more often around the community, has noted an improvement in her stability/balance with them donned.     Beginning/End Dates of Progress Note Reporting Period:   8/18/23 to 01/04/2024    Referring Provider:  Darby Sandoval

## 2024-01-04 NOTE — PATIENT INSTRUCTIONS
Electrolyte Supplements:  LMNT  Nuun tabs  Liquid IV    Exercises:  Start the Peloton!   Recommended Exercises - yoga, biking, strength training  Recommended Instructors - Kumar (pushes hard, 90s hip hop), Braydon (flamboyant, fun, lots of pop), Katherine (calm, focused, low key yoga)  Home Exercise Program - refer to [employer] andrew or handouts.

## 2024-01-18 ENCOUNTER — LAB (OUTPATIENT)
Dept: LAB | Facility: CLINIC | Age: 34
End: 2024-01-18
Payer: COMMERCIAL

## 2024-01-18 DIAGNOSIS — M62.81 GENERALIZED MUSCLE WEAKNESS: ICD-10-CM

## 2024-01-18 DIAGNOSIS — G82.20 PARAPARESIS (H): ICD-10-CM

## 2024-01-18 DIAGNOSIS — G35 MS (MULTIPLE SCLEROSIS) (H): ICD-10-CM

## 2024-01-18 LAB
ALBUMIN UR-MCNC: NEGATIVE MG/DL
APPEARANCE UR: CLEAR
BILIRUB UR QL STRIP: NEGATIVE
COLOR UR AUTO: YELLOW
GLUCOSE UR STRIP-MCNC: NEGATIVE MG/DL
HGB UR QL STRIP: NEGATIVE
KETONES UR STRIP-MCNC: NEGATIVE MG/DL
LEUKOCYTE ESTERASE UR QL STRIP: NEGATIVE
NITRATE UR QL: NEGATIVE
PH UR STRIP: 6.5 [PH] (ref 5–8)
SP GR UR STRIP: 1.02 (ref 1–1.03)
UROBILINOGEN UR STRIP-ACNC: 0.2 E.U./DL

## 2024-01-18 PROCEDURE — 81003 URINALYSIS AUTO W/O SCOPE: CPT

## 2024-01-18 PROCEDURE — 99000 SPECIMEN HANDLING OFFICE-LAB: CPT

## 2024-01-18 PROCEDURE — 36415 COLL VENOUS BLD VENIPUNCTURE: CPT

## 2024-01-18 PROCEDURE — 84207 ASSAY OF VITAMIN B-6: CPT | Mod: 90

## 2024-01-22 LAB — PYRIDOXAL PHOS SERPL-SCNC: 36.6 NMOL/L

## 2024-01-25 ENCOUNTER — THERAPY VISIT (OUTPATIENT)
Dept: PHYSICAL THERAPY | Facility: CLINIC | Age: 34
End: 2024-01-25
Attending: PSYCHIATRY & NEUROLOGY
Payer: COMMERCIAL

## 2024-01-25 DIAGNOSIS — G82.20 PARAPARESIS (H): ICD-10-CM

## 2024-01-25 DIAGNOSIS — G35 MS (MULTIPLE SCLEROSIS) (H): Primary | ICD-10-CM

## 2024-01-25 PROCEDURE — 97530 THERAPEUTIC ACTIVITIES: CPT | Mod: GP

## 2024-01-26 ENCOUNTER — ANCILLARY PROCEDURE (OUTPATIENT)
Dept: MRI IMAGING | Facility: CLINIC | Age: 34
End: 2024-01-26
Attending: PSYCHIATRY & NEUROLOGY
Payer: COMMERCIAL

## 2024-01-26 DIAGNOSIS — G35 MS (MULTIPLE SCLEROSIS) (H): ICD-10-CM

## 2024-01-26 PROCEDURE — 72156 MRI NECK SPINE W/O & W/DYE: CPT | Mod: GC | Performed by: RADIOLOGY

## 2024-01-26 PROCEDURE — A9585 GADOBUTROL INJECTION: HCPCS | Performed by: RADIOLOGY

## 2024-01-26 PROCEDURE — 70553 MRI BRAIN STEM W/O & W/DYE: CPT | Mod: GC | Performed by: RADIOLOGY

## 2024-01-26 PROCEDURE — 72157 MRI CHEST SPINE W/O & W/DYE: CPT | Mod: GC | Performed by: RADIOLOGY

## 2024-01-26 RX ORDER — GADOBUTROL 604.72 MG/ML
7.5 INJECTION INTRAVENOUS ONCE
Status: COMPLETED | OUTPATIENT
Start: 2024-01-26 | End: 2024-01-26

## 2024-01-26 RX ADMIN — GADOBUTROL 6.5 ML: 604.72 INJECTION INTRAVENOUS at 17:38

## 2024-01-29 NOTE — RESULT ENCOUNTER NOTE
Please notify patient that MRI reveals one new lesion in the brain, it does not enhance, so it could have formed anytime between now and 2019.  There are no definite new lesions in the spinal cord.  I understand that she is struggling significantly. It would be appropriate for her to try a course of steroids to see if this helps with any of her symptoms. Please see if she would like to pursue this (3 days IV or 10 day oral couse of lower dose) Darby Sandoval MD on 1/29/2024 at 6:58 AM

## 2024-02-01 ENCOUNTER — TELEPHONE (OUTPATIENT)
Dept: NEUROLOGY | Facility: CLINIC | Age: 34
End: 2024-02-01
Payer: COMMERCIAL

## 2024-02-01 DIAGNOSIS — G82.20 PARAPARESIS (H): ICD-10-CM

## 2024-02-01 DIAGNOSIS — G35 MS (MULTIPLE SCLEROSIS) (H): Primary | ICD-10-CM

## 2024-02-01 NOTE — TELEPHONE ENCOUNTER
----- Message from Montana Bailey, PT sent at 2/1/2024  9:24 AM CST -----  Regarding: RE: Wheelchair Evaluation  Hey Dr. Sandoval,  Unfortunately, it has to be OT because our seating/ in clinic here is an OT.    Nestor Johnson    ----- Message -----  From: Shelia Sandoval MD  Sent: 1/31/2024   2:05 PM CST  To: Montana Bailey PT  Subject: RE: Wheelchair Evaluation                        Gui Baez,     I did place a PT seating evaluation order on 1/19 --- is this adequate?     shelia Salinas  ----- Message -----  From: Montana Bailey, PT  Sent: 1/25/2024   7:58 AM CST  To: Shelia Sandoval MD  Subject: Wheelchair Evaluation                            Good morning Dr. Sandoval,  When you have time, could you please put in a referral to OT for a Seating/Wheelchair Evaluation? Mary is going to start the process to obtain a manual w/c. We'll get her on the schedule as soon as we can with Malathi Roberto, OT.    Thanks!    Nestor Johnson

## 2024-02-05 ENCOUNTER — THERAPY VISIT (OUTPATIENT)
Dept: PHYSICAL THERAPY | Facility: CLINIC | Age: 34
End: 2024-02-05
Attending: PSYCHIATRY & NEUROLOGY
Payer: COMMERCIAL

## 2024-02-05 ENCOUNTER — INFUSION THERAPY VISIT (OUTPATIENT)
Dept: INFUSION THERAPY | Facility: HOSPITAL | Age: 34
End: 2024-02-05
Attending: PSYCHIATRY & NEUROLOGY
Payer: COMMERCIAL

## 2024-02-05 VITALS
DIASTOLIC BLOOD PRESSURE: 74 MMHG | RESPIRATION RATE: 16 BRPM | HEART RATE: 81 BPM | OXYGEN SATURATION: 95 % | SYSTOLIC BLOOD PRESSURE: 117 MMHG | TEMPERATURE: 97.5 F

## 2024-02-05 DIAGNOSIS — G35 MS (MULTIPLE SCLEROSIS) (H): Primary | ICD-10-CM

## 2024-02-05 DIAGNOSIS — G82.20 PARAPARESIS (H): ICD-10-CM

## 2024-02-05 PROCEDURE — 258N000003 HC RX IP 258 OP 636: Performed by: PSYCHIATRY & NEUROLOGY

## 2024-02-05 PROCEDURE — 97110 THERAPEUTIC EXERCISES: CPT | Mod: GP

## 2024-02-05 PROCEDURE — 250N000011 HC RX IP 250 OP 636: Performed by: PSYCHIATRY & NEUROLOGY

## 2024-02-05 PROCEDURE — 96365 THER/PROPH/DIAG IV INF INIT: CPT

## 2024-02-05 PROCEDURE — 97530 THERAPEUTIC ACTIVITIES: CPT | Mod: GP

## 2024-02-05 RX ORDER — MEPERIDINE HYDROCHLORIDE 50 MG/ML
25 INJECTION INTRAMUSCULAR; INTRAVENOUS; SUBCUTANEOUS EVERY 30 MIN PRN
Status: CANCELLED | OUTPATIENT
Start: 2024-02-06

## 2024-02-05 RX ORDER — ALBUTEROL SULFATE 90 UG/1
1-2 AEROSOL, METERED RESPIRATORY (INHALATION)
Status: DISCONTINUED | OUTPATIENT
Start: 2024-02-05 | End: 2024-02-05 | Stop reason: HOSPADM

## 2024-02-05 RX ORDER — ALBUTEROL SULFATE 0.83 MG/ML
2.5 SOLUTION RESPIRATORY (INHALATION)
Status: CANCELLED | OUTPATIENT
Start: 2024-02-06

## 2024-02-05 RX ORDER — HEPARIN SODIUM,PORCINE 10 UNIT/ML
5-20 VIAL (ML) INTRAVENOUS DAILY PRN
Status: CANCELLED | OUTPATIENT
Start: 2024-02-06

## 2024-02-05 RX ORDER — DIPHENHYDRAMINE HYDROCHLORIDE 50 MG/ML
50 INJECTION INTRAMUSCULAR; INTRAVENOUS
Status: DISCONTINUED | OUTPATIENT
Start: 2024-02-05 | End: 2024-02-05 | Stop reason: HOSPADM

## 2024-02-05 RX ORDER — DIPHENHYDRAMINE HYDROCHLORIDE 50 MG/ML
50 INJECTION INTRAMUSCULAR; INTRAVENOUS
Status: CANCELLED
Start: 2024-02-06

## 2024-02-05 RX ORDER — METHYLPREDNISOLONE SODIUM SUCCINATE 125 MG/2ML
125 INJECTION, POWDER, LYOPHILIZED, FOR SOLUTION INTRAMUSCULAR; INTRAVENOUS
Status: CANCELLED
Start: 2024-02-06

## 2024-02-05 RX ORDER — MEPERIDINE HYDROCHLORIDE 50 MG/ML
25 INJECTION INTRAMUSCULAR; INTRAVENOUS; SUBCUTANEOUS EVERY 30 MIN PRN
Status: DISCONTINUED | OUTPATIENT
Start: 2024-02-05 | End: 2024-02-05 | Stop reason: HOSPADM

## 2024-02-05 RX ORDER — ALBUTEROL SULFATE 0.83 MG/ML
2.5 SOLUTION RESPIRATORY (INHALATION)
Status: DISCONTINUED | OUTPATIENT
Start: 2024-02-05 | End: 2024-02-05 | Stop reason: HOSPADM

## 2024-02-05 RX ORDER — METHYLPREDNISOLONE SODIUM SUCCINATE 125 MG/2ML
125 INJECTION, POWDER, LYOPHILIZED, FOR SOLUTION INTRAMUSCULAR; INTRAVENOUS
Status: DISCONTINUED | OUTPATIENT
Start: 2024-02-05 | End: 2024-02-05 | Stop reason: HOSPADM

## 2024-02-05 RX ORDER — EPINEPHRINE 1 MG/ML
0.3 INJECTION, SOLUTION INTRAMUSCULAR; SUBCUTANEOUS EVERY 5 MIN PRN
Status: CANCELLED | OUTPATIENT
Start: 2024-02-06

## 2024-02-05 RX ORDER — EPINEPHRINE 1 MG/ML
0.3 INJECTION, SOLUTION INTRAMUSCULAR; SUBCUTANEOUS EVERY 5 MIN PRN
Status: DISCONTINUED | OUTPATIENT
Start: 2024-02-05 | End: 2024-02-05 | Stop reason: HOSPADM

## 2024-02-05 RX ORDER — HEPARIN SODIUM (PORCINE) LOCK FLUSH IV SOLN 100 UNIT/ML 100 UNIT/ML
5 SOLUTION INTRAVENOUS
Status: CANCELLED | OUTPATIENT
Start: 2024-02-06

## 2024-02-05 RX ORDER — ALBUTEROL SULFATE 90 UG/1
1-2 AEROSOL, METERED RESPIRATORY (INHALATION)
Status: CANCELLED
Start: 2024-02-06

## 2024-02-05 RX ADMIN — SODIUM CHLORIDE 1000 MG: 9 INJECTION, SOLUTION INTRAVENOUS at 08:34

## 2024-02-05 RX ADMIN — SODIUM CHLORIDE 250 ML: 9 INJECTION, SOLUTION INTRAVENOUS at 08:34

## 2024-02-05 NOTE — PROGRESS NOTES
Infusion Nursing Note:  Mary Evans presents today for Methylprednisone.    Patient seen by provider today: No   present during visit today: Not Applicable.    Note: Patient states she has not been getting any stonger with Ocrevus infusion. Pt. Fell last week and has sore muscles in her hip and joints, but has a hairline fracture of her right wrist. Pt. Has received good results with methylprednisone in the past and tolerated well.PIV wrapped with gauze and coban and left in place for tomorrows 2nd infusion.      Intravenous Access:  Peripheral IV placed.    Treatment Conditions:  Not Applicable.      Post Infusion Assessment:  Patient tolerated infusion without incident.  Site patent and intact, free from redness, edema or discomfort.  No evidence of extravasations.       Discharge Plan:   Patient and/or family verbalized understanding of discharge instructions and all questions answered.      Jaci Nunez RN

## 2024-02-06 ENCOUNTER — INFUSION THERAPY VISIT (OUTPATIENT)
Dept: INFUSION THERAPY | Facility: HOSPITAL | Age: 34
End: 2024-02-06
Attending: PSYCHIATRY & NEUROLOGY
Payer: COMMERCIAL

## 2024-02-06 VITALS
SYSTOLIC BLOOD PRESSURE: 112 MMHG | HEART RATE: 100 BPM | DIASTOLIC BLOOD PRESSURE: 80 MMHG | RESPIRATION RATE: 16 BRPM | OXYGEN SATURATION: 98 % | TEMPERATURE: 98.3 F

## 2024-02-06 DIAGNOSIS — G35 MS (MULTIPLE SCLEROSIS) (H): Primary | ICD-10-CM

## 2024-02-06 PROCEDURE — 96365 THER/PROPH/DIAG IV INF INIT: CPT

## 2024-02-06 PROCEDURE — 258N000003 HC RX IP 258 OP 636: Performed by: PSYCHIATRY & NEUROLOGY

## 2024-02-06 PROCEDURE — 250N000011 HC RX IP 250 OP 636: Performed by: PSYCHIATRY & NEUROLOGY

## 2024-02-06 RX ORDER — DIPHENHYDRAMINE HYDROCHLORIDE 50 MG/ML
50 INJECTION INTRAMUSCULAR; INTRAVENOUS
Status: CANCELLED
Start: 2024-02-07

## 2024-02-06 RX ORDER — METHYLPREDNISOLONE SODIUM SUCCINATE 125 MG/2ML
125 INJECTION, POWDER, LYOPHILIZED, FOR SOLUTION INTRAMUSCULAR; INTRAVENOUS
Status: CANCELLED
Start: 2024-02-07

## 2024-02-06 RX ORDER — HEPARIN SODIUM (PORCINE) LOCK FLUSH IV SOLN 100 UNIT/ML 100 UNIT/ML
5 SOLUTION INTRAVENOUS
Status: CANCELLED | OUTPATIENT
Start: 2024-02-07

## 2024-02-06 RX ORDER — METHYLPREDNISOLONE SODIUM SUCCINATE 125 MG/2ML
125 INJECTION, POWDER, LYOPHILIZED, FOR SOLUTION INTRAMUSCULAR; INTRAVENOUS
Status: DISCONTINUED | OUTPATIENT
Start: 2024-02-06 | End: 2024-02-06 | Stop reason: HOSPADM

## 2024-02-06 RX ORDER — ALBUTEROL SULFATE 90 UG/1
1-2 AEROSOL, METERED RESPIRATORY (INHALATION)
Status: DISCONTINUED | OUTPATIENT
Start: 2024-02-06 | End: 2024-02-06 | Stop reason: HOSPADM

## 2024-02-06 RX ORDER — ALBUTEROL SULFATE 0.83 MG/ML
2.5 SOLUTION RESPIRATORY (INHALATION)
Status: DISCONTINUED | OUTPATIENT
Start: 2024-02-06 | End: 2024-02-06 | Stop reason: HOSPADM

## 2024-02-06 RX ORDER — DIPHENHYDRAMINE HYDROCHLORIDE 50 MG/ML
50 INJECTION INTRAMUSCULAR; INTRAVENOUS
Status: DISCONTINUED | OUTPATIENT
Start: 2024-02-06 | End: 2024-02-06 | Stop reason: HOSPADM

## 2024-02-06 RX ORDER — MEPERIDINE HYDROCHLORIDE 50 MG/ML
25 INJECTION INTRAMUSCULAR; INTRAVENOUS; SUBCUTANEOUS EVERY 30 MIN PRN
Status: DISCONTINUED | OUTPATIENT
Start: 2024-02-06 | End: 2024-02-06 | Stop reason: HOSPADM

## 2024-02-06 RX ORDER — ALBUTEROL SULFATE 0.83 MG/ML
2.5 SOLUTION RESPIRATORY (INHALATION)
Status: CANCELLED | OUTPATIENT
Start: 2024-02-07

## 2024-02-06 RX ORDER — MEPERIDINE HYDROCHLORIDE 50 MG/ML
25 INJECTION INTRAMUSCULAR; INTRAVENOUS; SUBCUTANEOUS EVERY 30 MIN PRN
Status: CANCELLED | OUTPATIENT
Start: 2024-02-07

## 2024-02-06 RX ORDER — HEPARIN SODIUM,PORCINE 10 UNIT/ML
5-20 VIAL (ML) INTRAVENOUS DAILY PRN
Status: CANCELLED | OUTPATIENT
Start: 2024-02-07

## 2024-02-06 RX ORDER — EPINEPHRINE 1 MG/ML
0.3 INJECTION, SOLUTION INTRAMUSCULAR; SUBCUTANEOUS EVERY 5 MIN PRN
Status: DISCONTINUED | OUTPATIENT
Start: 2024-02-06 | End: 2024-02-06 | Stop reason: HOSPADM

## 2024-02-06 RX ORDER — ALBUTEROL SULFATE 90 UG/1
1-2 AEROSOL, METERED RESPIRATORY (INHALATION)
Status: CANCELLED
Start: 2024-02-07

## 2024-02-06 RX ORDER — EPINEPHRINE 1 MG/ML
0.3 INJECTION, SOLUTION INTRAMUSCULAR; SUBCUTANEOUS EVERY 5 MIN PRN
Status: CANCELLED | OUTPATIENT
Start: 2024-02-07

## 2024-02-06 RX ADMIN — SODIUM CHLORIDE 1000 MG: 9 INJECTION, SOLUTION INTRAVENOUS at 08:29

## 2024-02-06 RX ADMIN — SODIUM CHLORIDE 250 ML: 9 INJECTION, SOLUTION INTRAVENOUS at 08:23

## 2024-02-06 NOTE — PROGRESS NOTES
Infusion Nursing Note:  Mary Evans presents today for Methylprednisolone infusion #2.    Patient seen by provider today: No   present during visit today: Not Applicable.    Note: Pt reports she did not sleep well last night after infusion. We went over side effects of steroids and this can be one of them. Pt otherwise feels good. No new problems to report.      Intravenous Access:  Peripheral IV placed.    Treatment Conditions:  Not Applicable.      Post Infusion Assessment:  Patient tolerated infusion without incident.  Site patent and intact, free from redness, edema or discomfort. Pt kept iv in left forearm for treatment tomorrow.      Discharge Plan:   Patient and/or family verbalized understanding of discharge instructions and all questions answered.      Dodie Nunn RN

## 2024-02-07 ENCOUNTER — INFUSION THERAPY VISIT (OUTPATIENT)
Dept: INFUSION THERAPY | Facility: HOSPITAL | Age: 34
End: 2024-02-07
Attending: PSYCHIATRY & NEUROLOGY
Payer: COMMERCIAL

## 2024-02-07 VITALS
TEMPERATURE: 97.4 F | OXYGEN SATURATION: 95 % | RESPIRATION RATE: 16 BRPM | SYSTOLIC BLOOD PRESSURE: 109 MMHG | HEART RATE: 83 BPM | DIASTOLIC BLOOD PRESSURE: 85 MMHG

## 2024-02-07 DIAGNOSIS — G35 MS (MULTIPLE SCLEROSIS) (H): Primary | ICD-10-CM

## 2024-02-07 PROCEDURE — 96365 THER/PROPH/DIAG IV INF INIT: CPT

## 2024-02-07 PROCEDURE — 250N000011 HC RX IP 250 OP 636: Performed by: PSYCHIATRY & NEUROLOGY

## 2024-02-07 PROCEDURE — 258N000003 HC RX IP 258 OP 636: Performed by: PSYCHIATRY & NEUROLOGY

## 2024-02-07 RX ORDER — HEPARIN SODIUM,PORCINE 10 UNIT/ML
5-20 VIAL (ML) INTRAVENOUS DAILY PRN
OUTPATIENT
Start: 2024-02-07

## 2024-02-07 RX ORDER — DIPHENHYDRAMINE HYDROCHLORIDE 50 MG/ML
50 INJECTION INTRAMUSCULAR; INTRAVENOUS
Status: DISCONTINUED | OUTPATIENT
Start: 2024-02-07 | End: 2024-02-07 | Stop reason: HOSPADM

## 2024-02-07 RX ORDER — EPINEPHRINE 1 MG/ML
0.3 INJECTION, SOLUTION INTRAMUSCULAR; SUBCUTANEOUS EVERY 5 MIN PRN
OUTPATIENT
Start: 2024-02-07

## 2024-02-07 RX ORDER — ALBUTEROL SULFATE 0.83 MG/ML
2.5 SOLUTION RESPIRATORY (INHALATION)
OUTPATIENT
Start: 2024-02-07

## 2024-02-07 RX ORDER — DIPHENHYDRAMINE HYDROCHLORIDE 50 MG/ML
50 INJECTION INTRAMUSCULAR; INTRAVENOUS
Start: 2024-02-07

## 2024-02-07 RX ORDER — ALBUTEROL SULFATE 90 UG/1
1-2 AEROSOL, METERED RESPIRATORY (INHALATION)
Status: DISCONTINUED | OUTPATIENT
Start: 2024-02-07 | End: 2024-02-07 | Stop reason: HOSPADM

## 2024-02-07 RX ORDER — ALBUTEROL SULFATE 90 UG/1
1-2 AEROSOL, METERED RESPIRATORY (INHALATION)
Start: 2024-02-07

## 2024-02-07 RX ORDER — MEPERIDINE HYDROCHLORIDE 50 MG/ML
25 INJECTION INTRAMUSCULAR; INTRAVENOUS; SUBCUTANEOUS EVERY 30 MIN PRN
OUTPATIENT
Start: 2024-02-07

## 2024-02-07 RX ORDER — MEPERIDINE HYDROCHLORIDE 50 MG/ML
25 INJECTION INTRAMUSCULAR; INTRAVENOUS; SUBCUTANEOUS EVERY 30 MIN PRN
Status: DISCONTINUED | OUTPATIENT
Start: 2024-02-07 | End: 2024-02-07 | Stop reason: HOSPADM

## 2024-02-07 RX ORDER — METHYLPREDNISOLONE SODIUM SUCCINATE 125 MG/2ML
125 INJECTION, POWDER, LYOPHILIZED, FOR SOLUTION INTRAMUSCULAR; INTRAVENOUS
Start: 2024-02-07

## 2024-02-07 RX ORDER — EPINEPHRINE 1 MG/ML
0.3 INJECTION, SOLUTION INTRAMUSCULAR; SUBCUTANEOUS EVERY 5 MIN PRN
Status: DISCONTINUED | OUTPATIENT
Start: 2024-02-07 | End: 2024-02-07 | Stop reason: HOSPADM

## 2024-02-07 RX ORDER — HEPARIN SODIUM (PORCINE) LOCK FLUSH IV SOLN 100 UNIT/ML 100 UNIT/ML
5 SOLUTION INTRAVENOUS
Status: DISCONTINUED | OUTPATIENT
Start: 2024-02-07 | End: 2024-02-07 | Stop reason: HOSPADM

## 2024-02-07 RX ORDER — HEPARIN SODIUM,PORCINE 10 UNIT/ML
5-20 VIAL (ML) INTRAVENOUS DAILY PRN
Status: DISCONTINUED | OUTPATIENT
Start: 2024-02-07 | End: 2024-02-07 | Stop reason: HOSPADM

## 2024-02-07 RX ORDER — HEPARIN SODIUM (PORCINE) LOCK FLUSH IV SOLN 100 UNIT/ML 100 UNIT/ML
5 SOLUTION INTRAVENOUS
OUTPATIENT
Start: 2024-02-07

## 2024-02-07 RX ORDER — METHYLPREDNISOLONE SODIUM SUCCINATE 125 MG/2ML
125 INJECTION, POWDER, LYOPHILIZED, FOR SOLUTION INTRAMUSCULAR; INTRAVENOUS
Status: DISCONTINUED | OUTPATIENT
Start: 2024-02-07 | End: 2024-02-07 | Stop reason: HOSPADM

## 2024-02-07 RX ORDER — ALBUTEROL SULFATE 0.83 MG/ML
2.5 SOLUTION RESPIRATORY (INHALATION)
Status: DISCONTINUED | OUTPATIENT
Start: 2024-02-07 | End: 2024-02-07 | Stop reason: HOSPADM

## 2024-02-07 RX ADMIN — SODIUM CHLORIDE 1000 MG: 9 INJECTION, SOLUTION INTRAVENOUS at 08:33

## 2024-02-07 NOTE — PROGRESS NOTES
Infusion Nursing Note:  Mary Evans presents today for daily methylprednisolone infusion.    Patient seen by provider today: No   present during visit today: Not Applicable.    Note: /85 (Patient Position: Sitting)   Pulse 83   Temp 97.4  F (36.3  C) (Oral)   Resp 16   SpO2 95% .  Methylprednisolone infused.    Intravenous Access:  Peripheral IV placed.    Treatment Conditions:  Not Applicable.      Post Infusion Assessment:  Patient tolerated infusion without incident.  Blood return noted pre and post infusion.  Site patent and intact, free from redness, edema or discomfort.  No evidence of extravasations.  Access discontinued per protocol.       Discharge Plan:   Discharge instructions reviewed with: Patient.  Patient and/or family verbalized understanding of discharge instructions and all questions answered.  Patient discharged in stable condition accompanied by: self.  Departure Mode: Ambulatory.      Silvia Stephens RN

## 2024-02-12 ENCOUNTER — THERAPY VISIT (OUTPATIENT)
Dept: PHYSICAL THERAPY | Facility: CLINIC | Age: 34
End: 2024-02-12
Attending: PSYCHIATRY & NEUROLOGY
Payer: COMMERCIAL

## 2024-02-12 ENCOUNTER — VIRTUAL VISIT (OUTPATIENT)
Dept: NEUROLOGY | Facility: CLINIC | Age: 34
End: 2024-02-12
Payer: COMMERCIAL

## 2024-02-12 ENCOUNTER — TELEPHONE (OUTPATIENT)
Dept: NEUROLOGY | Facility: CLINIC | Age: 34
End: 2024-02-12

## 2024-02-12 VITALS — HEIGHT: 69 IN | WEIGHT: 150 LBS | BODY MASS INDEX: 22.22 KG/M2

## 2024-02-12 DIAGNOSIS — G35 MS (MULTIPLE SCLEROSIS) (H): Primary | ICD-10-CM

## 2024-02-12 DIAGNOSIS — G82.20 PARAPARESIS (H): ICD-10-CM

## 2024-02-12 DIAGNOSIS — R26.81 GAIT INSTABILITY: ICD-10-CM

## 2024-02-12 DIAGNOSIS — G25.81 RESTLESS LEG SYNDROME: ICD-10-CM

## 2024-02-12 PROCEDURE — 97530 THERAPEUTIC ACTIVITIES: CPT | Mod: GP

## 2024-02-12 PROCEDURE — 97110 THERAPEUTIC EXERCISES: CPT | Mod: GP

## 2024-02-12 PROCEDURE — 99215 OFFICE O/P EST HI 40 MIN: CPT | Mod: 95 | Performed by: PSYCHIATRY & NEUROLOGY

## 2024-02-12 RX ORDER — GABAPENTIN 300 MG/1
300 CAPSULE ORAL AT BEDTIME
Qty: 30 CAPSULE | Refills: 5 | Status: SHIPPED | OUTPATIENT
Start: 2024-02-12 | End: 2024-06-26

## 2024-02-12 RX ORDER — METHYLPREDNISOLONE 4 MG
TABLET, DOSE PACK ORAL
Qty: 42 TABLET | Refills: 0 | Status: SHIPPED | OUTPATIENT
Start: 2024-02-12 | End: 2024-02-20

## 2024-02-12 ASSESSMENT — PATIENT HEALTH QUESTIONNAIRE - PHQ9: SUM OF ALL RESPONSES TO PHQ QUESTIONS 1-9: 27

## 2024-02-12 ASSESSMENT — PAIN SCALES - GENERAL: PAINLEVEL: SEVERE PAIN (7)

## 2024-02-12 NOTE — LETTER
February 12, 2024      Mary Evans  [address]  [place] [facility] [place] [address]        To Whom It May Concern:    Mary Evans is currently under my care for a chronic neurologic condition (multiple sclerosis) that can affect balance, walking ability, and energy levels.  It is my understanding that Ms. Evans is currently locked into a lease with your company for rental of her current unit.  However, her chronic condition has recently progressed and her physical abilities have changed. The unit that she currently inhabits is not appropriate for her currently level of function. She is at high risk of enduring a fall with a critical injury.     I am writing to request to allow her end her lease early without consequences.     I am making this request because her unit currently requires her to ascend one small flight of stairs (from the garage to the building, no handrails available) and full flight of stairs to enter her unit. Due to the progression of her condition she falls on a daily basis.  75% of the falls are occurring when she is trying enter the building from the garage.  This is partly because the stairwell does not have handrails, but also because the doors to the building are not handicap accessible (automated).      Ms. Evans was not anticipating this decline in her physical function.  If she continues living in her current residence she will be at high risk of fall with serious injury.  Please allow her end her lease early.     Sincerely,       Darby Sandoval MD

## 2024-02-12 NOTE — LETTER
2/12/2024         RE: Mary Evans  [address]  [place] [place] [facility] [place] [address]        Dear Colleague,    Thank you for referring your patient, Mary Evans, to the [facility]. Please see a copy of my visit note below.    Date of Service: 2/12/2024    [facility] Neurology   MS Clinic Follow-up     Subjective: 33-year-old woman who presents in follow-up for multiple sclerosis.    She has been working with physical therapy, but continues to struggle significantly with gait stability.  She is falling on a routine basis.  She falls at least daily.  She was able to obtain a walker which has been helpful for stability.  However she is struggling with her current residence.  She has to ascend 4 stairs to get from her garage to the hallway of her building.  This stairwell does not have any handrails.  This is where most of the falls occur.  She estimates 75% of the falls occur here.  There are an additional 13-15 stairs from the hallway up to her unit.  She also struggles to get in and out of the bathtub.  She is not able to take showers because of her instability.    She has significantly declined over the past few months.  As a mention she has been working with physical therapy, but they are seeing minimal benefit.  She did complete an updated MRI which reveals a new lesion.  I recommended a course of steroids.  Subsequent to the steroid she did notice improved energy, her mood actually improved, and she felt a bit stronger.  Her balance remained poor.  She completed the steroids last week, but today is feeling very stiff and heavy and is experiencing hot flashes.  She did have difficulty sleeping when receiving the IV steroids.    Her last dose of Ocrevus took place in November.  She is open to switching medications.    Of note, she had a suicide attempt in early February.  She notes that she has had passive thoughts in the past.  Depression has been  "significant for her since she  from her partner.  There has been considerable stress.  However she has never thought about acting on any of the thoughts.  This was particularly unusual for her.  Her primary care had started her on pramipexole for management of restless legs.  We discussed how this medication potentially could have played a role.    No Known Allergies    Current Outpatient Medications   Medication     gabapentin (NEURONTIN) 300 MG capsule     methylPREDNISolone (MEDROL DOSEPAK) 4 MG tablet therapy pack     dalfampridine (AMPYRA) 10 MG TB12 12 hr tablet     diazepam (VALIUM) 5 MG tablet     lamoTRIgine (LAMICTAL) 25 MG tablet     modafinil (PROVIGIL) 100 MG tablet     ocrelizumab 600 mg     oxyBUTYnin (DITROPAN) 5 MG tablet     PNV62/FA/OM3/DHA/EPA/FISH OIL (PRENATAL GUMMY ORAL)     tiZANidine (ZANAFLEX) 2 MG tablet     valACYclovir (VALTREX) 1000 MG tablet     venlafaxine (EFFEXOR XR) 75 MG 24 hr capsule     No current facility-administered medications for this visit.        Past medical, surgical, social and family history was personally reviewed. Pertinent details noted above.     Physical Examination:   Ht 1.753 m (5' 9\")   Wt 68 kg (150 lb)   BMI 22.15 kg/m      General: intermittently tearful   Speech is fluent and prosodic, responses to questions are appropriate, she asks appropriate questions and was engaged in the conversation     Tests/Imaging:     MRI results were reviewed     Assessment: 33-year-old woman with a chronic history of multiple sclerosis.  She has been radiologically stable on Ocrevus, but has experienced a clinical decline over the past 6 to 12 months.  Her most recent MRI did reveal new lesion.  She experienced clinical improvement with steroids.    It is my impression that she has ongoing inflammation related to multiple sclerosis.  Though certainly some of her symptoms are related to pseudo exacerbation.    I think it would be appropriate for her to switch " disease modifying therapies to a medication that affects B and T cells.  We discussed the risks and benefits of Mavenclad and Lemtrada in detail.  I recommended a trial of Mavenclad as this is less likely to cause significant fatigue and impair her ability to care for her children.    She was encouraged to proceed with her plans to move in with family as I think that the additional emotional and physical support will be beneficial for her.  A letter was written for her today supporting an early termination of her lease.    Plan:   -MTM referral for Mavenclad start  - Stop pramipexole  - Gabapentin trial for restless legs  - Baseline blood work  - Follow-up in 3 months    Note was completed with the assistance of Dragon Fluency software which can often result in accidental word substitutions.     A total of 50 minutes on the date of service were spent in the care of this patient.   Darby Sandoval MD on 2/12/2024 at 12:55 PM        Virtual Visit Details    Type of service:  Video Visit   Video Start Time:  1151  Video End Time: 1220    Originating Location (pt. Location): Home  {PROVIDER LOCATION On-site should be selected for visits conducted from your clinic location or adjoining [facility] hospital, academic office, or other nearby [facility] building. Off-site should be selected for all other provider locations, including home:[id number]}  Distant Location (provider location):  On-site  Platform used for Video Visit: Fredi      Again, thank you for allowing me to participate in the care of your patient.        Sincerely,        Darby Sandoval MD

## 2024-02-12 NOTE — PROGRESS NOTES
Date of Service: 2/12/2024    [facility] Neurology   MS Clinic Follow-up     Subjective: 33-year-old woman who presents in follow-up for multiple sclerosis.    She has been working with physical therapy, but continues to struggle significantly with gait stability.  She is falling on a routine basis.  She falls at least daily.  She was able to obtain a walker which has been helpful for stability.  However she is struggling with her current residence.  She has to ascend 4 stairs to get from her garage to the hallway of her building.  This stairwell does not have any handrails.  This is where most of the falls occur.  She estimates 75% of the falls occur here.  There are an additional 13-15 stairs from the hallway up to her unit.  She also struggles to get in and out of the bathtub.  She is not able to take showers because of her instability.    She has significantly declined over the past few months.  As a mention she has been working with physical therapy, but they are seeing minimal benefit.  She did complete an updated MRI which reveals a new lesion.  I recommended a course of steroids.  Subsequent to the steroid she did notice improved energy, her mood actually improved, and she felt a bit stronger.  Her balance remained poor.  She completed the steroids last week, but today is feeling very stiff and heavy and is experiencing hot flashes.  She did have difficulty sleeping when receiving the IV steroids.    Her last dose of Ocrevus took place in November.  She is open to switching medications.    Of note, she had a suicide attempt in early February.  She notes that she has had passive thoughts in the past.  Depression has been significant for her since she  from her partner.  There has been considerable stress.  However she has never thought about acting on any of the thoughts.  This was particularly unusual for her.  Her primary care had started her on pramipexole for management of restless legs.  We  "discussed how this medication potentially could have played a role.    No Known Allergies    Current Outpatient Medications   Medication    gabapentin (NEURONTIN) 300 MG capsule    methylPREDNISolone (MEDROL DOSEPAK) 4 MG tablet therapy pack    dalfampridine (AMPYRA) 10 MG TB12 12 hr tablet    diazepam (VALIUM) 5 MG tablet    lamoTRIgine (LAMICTAL) 25 MG tablet    modafinil (PROVIGIL) 100 MG tablet    ocrelizumab 600 mg    oxyBUTYnin (DITROPAN) 5 MG tablet    PNV62/FA/OM3/DHA/EPA/FISH OIL (PRENATAL GUMMY ORAL)    tiZANidine (ZANAFLEX) 2 MG tablet    valACYclovir (VALTREX) 1000 MG tablet    venlafaxine (EFFEXOR XR) 75 MG 24 hr capsule     No current facility-administered medications for this visit.        Past medical, surgical, social and family history was personally reviewed. Pertinent details noted above.     Physical Examination:   Ht 1.753 m (5' 9\")   Wt 68 kg (150 lb)   BMI 22.15 kg/m      General: intermittently tearful   Speech is fluent and prosodic, responses to questions are appropriate, she asks appropriate questions and was engaged in the conversation     Tests/Imaging:     MRI results were reviewed     Assessment: 33-year-old woman with a chronic history of multiple sclerosis.  She has been radiologically stable on Ocrevus, but has experienced a clinical decline over the past 6 to 12 months.  Her most recent MRI did reveal new lesion.  She experienced clinical improvement with steroids.    It is my impression that she has ongoing inflammation related to multiple sclerosis.  Though certainly some of her symptoms are related to pseudo exacerbation.    I think it would be appropriate for her to switch disease modifying therapies to a medication that affects B and T cells.  We discussed the risks and benefits of Mavenclad and Lemtrada in detail.  I recommended a trial of Mavenclad as this is less likely to cause significant fatigue and impair her ability to care for her children.    She was encouraged to " proceed with her plans to move in with family as I think that the additional emotional and physical support will be beneficial for her.  A letter was written for her today supporting an early termination of her lease.    Plan:   -MTM referral for Mavenclad start  - Stop pramipexole  - Gabapentin trial for restless legs  - Baseline blood work  - Follow-up in 3 months    Note was completed with the assistance of Dragon Fluency software which can often result in accidental word substitutions.     A total of 50 minutes on the date of service were spent in the care of this patient.   Darby Sandoval MD on 2/12/2024 at 12:55 PM        Virtual Visit Details    Type of service:  Video Visit   Video Start Time:  1151  Video End Time: 1220    Originating Location (pt. Location): Home    Distant Location (provider location):  On-site  Platform used for Video Visit: Fredi      ADDENDUM:   Due to the significant functional decline that Ms. Evans has suffered secondary to her multiple sclerosis, she would benefit from a scooter to aid in energy conservation and safe ambulation when performing activities of daily living.   Darby Sandoval MD on 4/30/2024 at 3:16 PM

## 2024-02-12 NOTE — NURSING NOTE
Is the patient currently in the state of [place]? YES    Visit mode:VIDEO    If the visit is dropped, the patient can be reconnected by: VIDEO VISIT: Text to cell phone:   Telephone Information:   Mobile [phone]       Will anyone else be joining the visit? NO  (If patient encounters technical issues they should call [phone] :[phone])    How would you like to obtain your AVS? MyChart    Are changes needed to the allergy or medication list? No    Reason for visit: Follow Up    [facility]    Depression Response    Patient completed the PHQ-9 assessment for depression and scored >9? Yes  Question 9 on the PHQ-9 was positive for suicidality? Yes  Does patient have current mental health provider? Yes    Is this a virtual visit? Yes   Does patient have suicidal ideation (positive question 9)? NO     I personally notified the following: visit provider

## 2024-02-12 NOTE — PATIENT INSTRUCTIONS
You are experiencing a decline in function   Your MRI was remarkable for a single new lesion in the brain   You did experience an improvement in energy level, strength, and endurance while you received steroids     You are currently feeling stiff and fatigued   I believe that you are experiencing steroid withdrawal --> oral steroids were sent to your pharmacy   Take all pills for the given day in the morning with food in your stomach    I also recommend you change therapies --> start mavenclad   Due to the unique dosing of this medication, I have requested pharmacy to help coordinate   They will do a video visit with you   Other patients have found this very helpful     You reported restless legs   Pramipexole has helped, but this medication can cause compulsive thoughts and I am concerned that it might have contributed to your recent suicide attempt --> stop this medication   Start gabapentin 300 mg at bedtime   Let me know if this makes you too sleepy or imbalanced    I have written a letter to help you get out of your lease     Follow up with me in 3 months

## 2024-02-14 ENCOUNTER — TELEPHONE (OUTPATIENT)
Dept: NEUROLOGY | Facility: CLINIC | Age: 34
End: 2024-02-14
Payer: COMMERCIAL

## 2024-02-14 NOTE — TELEPHONE ENCOUNTER
Health Call Center    Phone Message    May a detailed message be left on voicemail: yes     Reason for Call: Other: Pt is requesting a call back to see if Dr. Sandoval has written a letter to get out of her lease due to there being no elevator and only stairs.      Pt also states her Gabapentin gave her a bad reaction yesterday on 2/13/24.       Please call to advise at # [phone]    Action Taken: Message routed to:  [facility] ([facility] Neurology     Travel Screening: Not Applicable

## 2024-02-14 NOTE — TELEPHONE ENCOUNTER
Called and spoke with patient.    She states the letter could be left at the office desk in [place], and she would swing by to pick it up.    In regards to the Gabapentin, patient states after first dose she felt her restlessness was amplified (leading to pacing around and just an overall uncomfortable). Writer did confirm that patient had recently stopped the pramipexole in the previous day or two, so unsure if that was also contributing to these perceived side effects.    Will have MD advise.    Be Cruz RN, BSN  [facility] Neurology

## 2024-02-14 NOTE — TELEPHONE ENCOUNTER
Be,     I checked into the pt's chart and looks like the letter is in. I was going to call the pt back in regards to her letter but she had a question on gabapentin. Per msg below, she stated it gave her a bad reaction.      Can you please call her in regards to the reaction and if you can let her know that the letter was written as well. Can you please ask her if she would like the letter to be faxed, mailed to her, or be picked up in clinic. You can forward message back to the care team pool.      Thank you Be.      ELIZABETH Dejesus on 2/14/2024 at 2:01 PM

## 2024-02-15 NOTE — TELEPHONE ENCOUNTER
Agree with concern that recent discontinuation of pramipexole could contribute     Advise she try one more time     If same symptoms occur, then would advise trial of pregabalin instead

## 2024-02-15 NOTE — TELEPHONE ENCOUNTER
Per Lien,    Pt came into clinic this morning to  letter that Dr. Sandoval wrote. The letter was printed and given to Lien to give it to the pt.          ELIZABETH Dejesus on 2/15/2024 at 9:55 AM

## 2024-02-15 NOTE — TELEPHONE ENCOUNTER
"Called and left message for patient, advising that patient try another dose of the gabapentin.    Per provider:  Darby Sandoval MD  \"Agree with concern that recent discontinuation of pramipexole could contribute   Advise she try one more time   If same symptoms occur, then would advise trial of pregabalin instead\"    Encouraged call back to clinic for further questions/concerns.          Be Cruz RN, BSN  [facility] Neurology    "

## 2024-02-16 ENCOUNTER — TELEPHONE (OUTPATIENT)
Dept: NEUROLOGY | Facility: CLINIC | Age: 34
End: 2024-02-16
Payer: COMMERCIAL

## 2024-02-16 NOTE — TELEPHONE ENCOUNTER
[facility] Call Center    Phone Message    May a detailed message be left on voicemail: yes     Reason for Call: Medication Question or concern regarding medication   Prescription Clarification  Name of Medication: gabapentin (NEURONTIN) 300 MG capsule [6544] (Order [id number])      Prescribing Provider: Darby Sandoval MD   Pharmacy: [id number] [place] [id number] AT [facility] [address]   What on the order needs clarification? Pt says the above medication is still not working, she is asking to start the Pregablin.    Please call Mary at [phone] to discuss further.      Action Taken: Message routed to:  Other: [facility] Neurology    Travel Screening: Not Applicable

## 2024-02-19 NOTE — TELEPHONE ENCOUNTER
"Called and spoke with patient, who stated that in all honesty they hadn't tried taking the gabapentin again, even with the recommendation from the previous encounter (because of that initial experience).    Writer re-educated on Dr. Sandoval's previous insights \"Agree with concern that recent discontinuation of pramipexole could contribute   Advise she try one more time   If same symptoms occur, then would advise trial of pregabalin instead.\"    Patient also has appointment with MTM-Referral tomorrow and writer advised further conversation with Sushila Sung.    Patient amenable to this plan and will call back as needed (if/when a new medication is needed).    Be Cruz, RN, BSN  [facility] Neurology    "

## 2024-02-20 ENCOUNTER — VIRTUAL VISIT (OUTPATIENT)
Dept: NEUROLOGY | Facility: CLINIC | Age: 34
End: 2024-02-20
Attending: PSYCHIATRY & NEUROLOGY
Payer: COMMERCIAL

## 2024-02-20 DIAGNOSIS — G35 MS (MULTIPLE SCLEROSIS) (H): Primary | ICD-10-CM

## 2024-02-20 DIAGNOSIS — Z78.9 TAKES DIETARY SUPPLEMENTS: ICD-10-CM

## 2024-02-20 DIAGNOSIS — R11.0 NAUSEA: ICD-10-CM

## 2024-02-20 DIAGNOSIS — F32.A DEPRESSION: ICD-10-CM

## 2024-02-20 DIAGNOSIS — Z86.19 H/O COLD SORES: ICD-10-CM

## 2024-02-20 DIAGNOSIS — G25.81 RESTLESS LEG SYNDROME: ICD-10-CM

## 2024-02-20 RX ORDER — VENLAFAXINE HYDROCHLORIDE 225 MG/1
225 TABLET, EXTENDED RELEASE ORAL DAILY
COMMUNITY
Start: 2024-01-07

## 2024-02-20 RX ORDER — ONDANSETRON 8 MG/1
8 TABLET, ORALLY DISINTEGRATING ORAL EVERY 8 HOURS PRN
COMMUNITY
Start: 2023-05-30 | End: 2024-06-26

## 2024-02-20 RX ORDER — LURASIDONE HYDROCHLORIDE 20 MG/1
30 TABLET, FILM COATED ORAL DAILY
COMMUNITY
Start: 2024-02-08 | End: 2024-06-26

## 2024-02-20 RX ORDER — CLADRIBINE 10 MG/1
TABLET ORAL
Status: CANCELLED | OUTPATIENT
Start: 2024-02-20

## 2024-02-20 NOTE — PROGRESS NOTES
Medication Therapy Management (MTM) Encounter    ASSESSMENT:                            Medication Adherence/Access: No issues identified    MS:   Patient would benefit from starting Mavenclad. Will send prescription to pharmacy once baseline labs result.  All patient's questions were answered. Education provided to the patient on medication dosing, administration, and potential side effects. Patient educated on the need to avoid live-vaccinations. If patient is not immune to varicella, recommend vaccination about 4-6 weeks prior to initiation of treatment.  Of note, tizanidine, oxybutynin and modafinil removed from patient's medication list as she is not using these medications.   Addendum 2/27/24: baseline labs reviewed. Prescription for Mavenclad sent to pharmacy for patient to start after 2/1/24 (3 months after last dose of Ocrevus)    Restless Leg Syndrome:   Encouraged patient to continue to try newly prescribed gabapentin as recommended by neurologist.     Depression:   Ensured patient does not have active plan for suicide. Patient confirms she has crisis hotlines and support. Recommend patient continue to work with psychiatrist and counselor. May benefit from consolidating to one prescriber for mood medications.     Cold Sores:   No changes recommended. Appropriately taking valacyclovir as needed.     Nausea:   Infrequent use as needed anti-nausea medication.     Supplements:   Stable.     PLAN:                            Medication list updated   Once your labs result, I will send in the prescription for Mavenclad to [facility]   Addendum 2/27/24: baseline labs reviewed. Prescription for Mavenclad sent to pharmacy for patient to start after 2/1/24 (3 months after last dose of Ocrevus). Patient notified. Patient needs repeat labs prior to second cycle.     Follow-up: 4/3 @ 10:30AM via phone call     SUBJECTIVE/OBJECTIVE:                          Mary Evans is a 33 year old female called  for an initial visit. She was referred to me from Dr. Sandoval.      Reason for visit: Initial MTM; Mamyriamd start.    Allergies/ADRs: Reviewed in chart  Past Medical History: Reviewed in chart  Tobacco: She reports that she has never smoked. She does not have any smokeless tobacco history on file.  Alcohol: none    Medication Adherence/Access: no issues reported    MS:   - Dalfampridine 10mg twice daily  - Prescribed but no longer taking modafinil 100-200mg every day as needed for chronic fatigue. Last dose was a few days ago. Feels like medication is very helpful with her fatigue but also makes her more impulsive which she is nervous about given her suicidal ideations/mental health.   - Prescribed but not taking oxybutynin 5mg twice daily for neurogenic bladder. Has issues with urinary urgency. Does not think the oxybutynin was very effective when she did take it.  She has been wearing diapers and does not plan on retrying the oxybutynin.   - Prescribed but not taking tizanidine 1-2mg twice daily for spasticity    - Was on Ocrevus, last infusion 11/1/23. No issues with infusions but does not report any benefit from the treatment.   - Received high dose methylprednisolone IV beginning of February. She then was started on Medrol dosepak. Completed Medrol dosepak about 2 days ago. Feels tired and cranky since she was not sleeping much while on this.  Found the dosepak to be helpful after the high dose IV steroids.   Notes that she is still having daily falls and issues with gait. Does care for her three children, two of them have medical needs.      Baseline screenings:     Addendum 2/27/24:   HIV nonreactive  Quantiferon TB Gold Plus negative  Hep B surface antibody reactive  Hep B surface antigen non-reactive  Hep B core antibody non-reactive  Hep C antibody non-reactive  Lymphocytes pending  Varicella antibody positive  CMP complete  TSH WNL   CBC with platelets complete; lymphocytes >800 cells/uL  Pregnancy  negative   Hepatic Function WNL     Disease Modifying Therapy History:   - Avonex 2008, brief, suffered severe flu side effects  - Betaseron 2008 x several months, radiologic progression   - Copaxone several years, interrupted for pregnancy; 40mg (hives); 20mg radiologic progression  - Gilenya 2016 - discontinued for pregnancy  - Tecfidera 2019   - Ocrevus [id number]    Restless Leg Syndrome:   - Gabapentin 300mg at bedtime. She took 1 dose of this medication and had bad night. She has been hesitant to take subsequent doses. She spoke with Dr. Sandoval regarding this and was informed to try the gabapentin again. When starting pramipezole she had a really great improvement in RLS but this also caused worsening suicidal ideation.     Depression:   - Venlafaxine XR 225mg every day. Prescribed by her primary care doctor.    - Lurasidone 30mg every day. Prescribed by her psychiatrist at [facility].   Has upcoming appt with Patricia in 2 weeks. Follows with psychiatrist and counselor. Earlier this month she was started on pramipexole for RLS. This caused her to have suicidal ideations with attempt that resulted in a visit to the emergency room. She notes that she would like to have one provider prescribing all her mood medications. Because of this incident, she is hesitant to take medications and this is the reason she is stopping the modafinil. Since stopping pramipexole, her suicidal ideations are much less severe and she no longer has a plan but she still feels her mood could be improved.          2/12/2024    11:22 AM   PHQ   PHQ-9 Total Score 27   Q9: Thoughts of better off dead/self-harm past 2 weeks Nearly every day       Cold Sores:   - Valacyclovir 1000mg every day x3 days for oral cold sores. She currently has cold sores so she is taking the medication. She also has a history of genital herpes but has not had a breakout since pregnancy.     Nausea:   - Zofran 8mg ODT every 8 hours as needed. Has not used in the last  month.     Supplements:   - Vitamin D3 every day   - Magnesium every day   - Prenatal vitamin every day   Patient does not know the strengths of the magnesium or vitamin D3. She buys these over the counter.       Today's Vitals: There were no vitals taken for this visit.  ----------------  Post Discharge Medication Reconciliation Status: patient was not discharged from an inpatient facility or TCU.    I spent 48 minutes with this patient today. All changes were made via verbal approval with Dr. Sandoval. A copy of the visit note was provided to the patient's provider(s). ED visit 2/2/24.     A summary of these recommendations was declined by patient.    Sushila Sung, Pharm.D., MPH  Medication Therapy Management Pharmacist   [facility]    Telemedicine Visit Details  Type of service:  Telephone visit  Start Time: 10:12 AM  End Time:  11:00 AM     Medication Therapy Recommendations  No medication therapy recommendations to display

## 2024-02-20 NOTE — Clinical Note
2/20/2024       RE: Mary Evans  [address]  [facility] [address]     Dear Colleague,    Thank you for referring your patient, Mary Evans, to the [facility] [place] [facility]. Please see a copy of my visit note below.    Medication Therapy Management (MTM) Encounter    ASSESSMENT:                            Medication Adherence/Access: {adherencechoices:150693}    ***:  ***      PLAN:                            ***    Follow-up: {followuptest2:422532}    SUBJECTIVE/OBJECTIVE:                          Mary Evans is a 33 year old female called for an initial visit. She was referred to me from Dr. Sandoval.      Reason for visit: Initial MTM; Mamyriamd start.    Allergies/ADRs: Reviewed in chart  Past Medical History: Reviewed in chart  Tobacco: She reports that she has never smoked. She does not have any smokeless tobacco history on file.  Alcohol: none    Medication Adherence/Access: no issues reported    MS:   - Modafinil 100-200mg every day as needed for chronic fatigue. Last dose was a few days ago. Is planning on stopping this medication all together. She notes that this makes her more impulsive so she is neroous suidicial ideation ***  -  prescribed but not taking oxybutynin 5mg twice daily for neurogenic bladder. Has issues with urinary urgency. Does not think the oxybutynin in a while. She instead has been wearing diapers and that is effective.   - Prescribed but not taking tizanidine 1-2mg twice daily for spasticity    - Medrol dosepak. Completed this about 2 days ago. Feels tired and cranky since she was not sleeping much while on this ***   - Dalfampridine 10mg twice daily.    Having daily falls stills.     Oral: 3.5 mg/kg over 2-year treatment course, administered as 1.75 mg/kg in each year. Divide the 1.75 mg/kg dose over 2 cycles, each cycle lasting 4 to 5 consecutive days; do not  administer more than 20 mg/day. In the first-year treatment course, initiate the first cycle at any time; administer the second cycle 23 to 27 days after the last dose of the first cycle. In the second-year treatment course, initiate the first cycle ?43 weeks after the last dose of the first year's second cycle. Administer the second cycle 23 to 27 days after the last dose of the second year's first cycle. Following 2 years of treatment, do not administer oral cladribine during the next 2 years. Refer to 's labeling for additional dosing details, including dosing tablesMissed doses: If a dose is not administered on a scheduled day, administer the missed dose on the following day and extend the number of days in that treatment cycle. If 2 consecutive doses are missed, extended the treatment cycle by 2 days.    When using for the treatment of MS, complete necessary immunizations at least 4 to 6 weeks prior to initiating cladribine. Avoid live-attenuated vaccines in patients who currently receive or have recently discontinued cladribine; consider using live-attenuated vaccines only if risk of infection is high and killed vaccines are unavailable (AAN [Desmond 2019]). Provide varicella zoster virus (VZV) vaccination prior to initiation of therapy in VZV antibody-negative patients. In antibody-positive patients, vaccination with zoster vaccine (recombinant) is recommended any time prior to or during the course of treatment; may also administer if lymphocyte counts are ?500 cells/mm3     Administer with water (with or without food); swallow whole immediately after removing from packaging; do not chew. Patients should use dry hands for handling and avoid prolonged contact with skin; wash hands and any surface that came in contact with the tablet thoroughly afterwards. Separate administration from any other oral medication by 3 hours    Baseline screenings:   HIV pending  TB pending  Hep B surface pending  Hep B  surface antigen pending  Hep B core antibody pending  Hep C pending  Lymphocytes pending  Varicella antibody positive ***  CBC with differential completed   Hepatic panel completed ***    Disease Modifying Therapy History:   - Ocrevus; stopping due to disease progression***    Restless Leg Syndrome:   - Gabapentin 300mg at bedtime. She took 1 dose of this medication and noticed she had a  bad night. She spoke with Dr. Sandoval regarding this and Dr. Sandoval felt this may have been seoncdary to stoppign the pamiprexole. When starting pramipezole she had a really great improvement in RLS but this also caused worsening suicidal ideation. Still having SI but this is much improvement since stopping. Workign with psych and counselor and therapy and has numbers.     Mood:   - Venlafaxine XR 225mg every day. Prescribed by her primary care doctor.    - Lurasidone 30mg every day. Prescribed by her psychiatrist at [facility].   Has upcoming appt with Patricia in 2 weeks.     Cold Sores:   - Valacyclovir 1000mg every day x3 days for oral cold sores currently using this ***   Has a current cold sore but has not had any genital herpes since pregnancy. ***     Nausea:   - Zofran as needed. Has not used in the last month.     Supplements:   - Vitamin D3 every day   - Magnesium every day   - Prenatal vitamin every day   Patient does not know the strengths of the magnesium or vitamin D3. She buys this OTC.       Today's Vitals: There were no vitals taken for this visit.  ----------------  {MANUELA?:482954}    I spent 48 minutes with this patient today. All changes were made via verbal approval with Dr. Sandoval. A copy of the visit note was provided to the patient's provider(s).    A summary of these recommendations was sent via [employer].    Sushila Sung, Pharm.D., MPH  Medication Therapy Management Pharmacist   [facility]    Telemedicine Visit Details  Type of service:  Telephone visit  Start Time: 10:12 AM  End Time:  11:00  AM     Medication Therapy Recommendations  No medication therapy recommendations to display       Medication Therapy Management (MTM) Encounter    ASSESSMENT:                            Medication Adherence/Access: {adherencechoices:104201}    ***:  ***      PLAN:                            ***    Follow-up: {followuptest2:025677}    SUBJECTIVE/OBJECTIVE:                          Mary Evans is a 33 year old female called for an initial visit. She was referred to me from Dr. Sandoval.      Reason for visit: Initial MTM; Mavencald start.    Allergies/ADRs: Reviewed in chart  Past Medical History: Reviewed in chart  Tobacco: She reports that she has never smoked. She does not have any smokeless tobacco history on file.  Alcohol: none    Medication Adherence/Access: no issues reported    MS:   - Modafinil 100-200mg every day as needed for chronic fatigue. Last dose was a few days ago. Is planning on stopping this medication all together. She notes that this makes her more impulsive so she is neroous suidicial ideation ***  -  prescribed but not taking oxybutynin 5mg twice daily for neurogenic bladder. Has issues with urinary urgency. Does not think the oxybutynin in a while. She instead has been wearing diapers and that is effective.   - Prescribed but not taking tizanidine 1-2mg twice daily for spasticity    - Medrol dosepak. Completed this about 2 days ago. Feels tired and cranky since she was not sleeping much while on this ***   - Dalfampridine 10mg twice daily.    Having daily falls stills.     Oral: 3.5 mg/kg over 2-year treatment course, administered as 1.75 mg/kg in each year. Divide the 1.75 mg/kg dose over 2 cycles, each cycle lasting 4 to 5 consecutive days; do not administer more than 20 mg/day. In the first-year treatment course, initiate the first cycle at any time; administer the second cycle 23 to 27 days after the last dose of the first cycle. In the second-year treatment course, initiate the first  cycle =43 weeks after the last dose of the first year's second cycle. Administer the second cycle 23 to 27 days after the last dose of the second year's first cycle. Following 2 years of treatment, do not administer oral cladribine during the next 2 years. Refer to 's labeling for additional dosing details, including dosing tablesMissed doses: If a dose is not administered on a scheduled day, administer the missed dose on the following day and extend the number of days in that treatment cycle. If 2 consecutive doses are missed, extended the treatment cycle by 2 days.    When using for the treatment of MS, complete necessary immunizations at least 4 to 6 weeks prior to initiating cladribine. Avoid live-attenuated vaccines in patients who currently receive or have recently discontinued cladribine; consider using live-attenuated vaccines only if risk of infection is high and killed vaccines are unavailable (AAN [Desmond 2019]). Provide varicella zoster virus (VZV) vaccination prior to initiation of therapy in VZV antibody-negative patients. In antibody-positive patients, vaccination with zoster vaccine (recombinant) is recommended any time prior to or during the course of treatment; may also administer if lymphocyte counts are =500 cells/mm3     Administer with water (with or without food); swallow whole immediately after removing from packaging; do not chew. Patients should use dry hands for handling and avoid prolonged contact with skin; wash hands and any surface that came in contact with the tablet thoroughly afterwards. Separate administration from any other oral medication by 3 hours    Baseline screenings:   HIV pending  TB pending  Hep B surface pending  Hep B surface antigen pending  Hep B core antibody pending  Hep C pending  Lymphocytes pending  Varicella antibody positive ***  CBC with differential completed   Hepatic panel completed ***    Disease Modifying Therapy History:   - Ocrevus; stopping  due to disease progression***    Restless Leg Syndrome:   - Gabapentin 300mg at bedtime. She took 1 dose of this medication and noticed she had a  bad night. She spoke with Dr. Sandoval regarding this and Dr. Sandoval felt this may have been seoncdary to stoppign the pamiprexole. When starting pramipezole she had a really great improvement in RLS but this also caused worsening suicidal ideation. Still having SI but this is much improvement since stopping. Workign with psych and counselor and therapy and has numbers.     Depression:   - Venlafaxine XR 225mg every day. Prescribed by her primary care doctor.    - Lurasidone 30mg every day. Prescribed by her psychiatrist at [facility].   Has upcoming appt with [employer] in 2 weeks.     Cold Sores:   - Valacyclovir 1000mg every day x3 days for oral cold sores currently using this ***   Has a current cold sore but has not had any genital herpes since pregnancy. ***     Nausea:   - Zofran 8mg ODT every 8 hours as needed. Has not used in the last month.     Supplements:   - Vitamin D3 every day   - Magnesium every day   - Prenatal vitamin every day   Patient does not know the strengths of the magnesium or vitamin D3. She buys this OTC.       Today's Vitals: There were no vitals taken for this visit.  ----------------  {MANUELA?:514106}    I spent 48 minutes with this patient today. All changes were made via verbal approval with Dr. Sandoval. A copy of the visit note was provided to the patient's provider(s).    A summary of these recommendations was sent via [employer].    Sushila Sung, Pharm.D., MPH  Medication Therapy Management Pharmacist   [facility]    Telemedicine Visit Details  Type of service:  Telephone visit  Start Time: 10:12 AM  End Time:  11:00 AM     Medication Therapy Recommendations  No medication therapy recommendations to display         Again, thank you for allowing me to participate in the care of your patient.      Sincerely,    Sushila Kendall  [facility]

## 2024-02-21 ENCOUNTER — LAB (OUTPATIENT)
Dept: LAB | Facility: CLINIC | Age: 34
End: 2024-02-21
Payer: COMMERCIAL

## 2024-02-21 DIAGNOSIS — G35 MS (MULTIPLE SCLEROSIS) (H): ICD-10-CM

## 2024-02-21 LAB
ALBUMIN SERPL BCG-MCNC: 4.2 G/DL (ref 3.5–5.2)
ALP SERPL-CCNC: 83 U/L (ref 40–150)
ALT SERPL W P-5'-P-CCNC: 21 U/L (ref 0–50)
ANION GAP SERPL CALCULATED.3IONS-SCNC: 8 MMOL/L (ref 7–15)
AST SERPL W P-5'-P-CCNC: 20 U/L (ref 0–45)
BILIRUB SERPL-MCNC: 0.5 MG/DL
BUN SERPL-MCNC: 18.5 MG/DL (ref 6–20)
CALCIUM SERPL-MCNC: 8.9 MG/DL (ref 8.6–10)
CHLORIDE SERPL-SCNC: 102 MMOL/L (ref 98–107)
CREAT SERPL-MCNC: 0.64 MG/DL (ref 0.51–0.95)
DEPRECATED HCO3 PLAS-SCNC: 27 MMOL/L (ref 22–29)
EGFRCR SERPLBLD CKD-EPI 2021: >90 ML/MIN/1.73M2
GLUCOSE SERPL-MCNC: 98 MG/DL (ref 70–99)
HBV CORE AB SERPL QL IA: NONREACTIVE
HBV SURFACE AB SERPL IA-ACNC: >1000 M[IU]/ML
HBV SURFACE AB SERPL IA-ACNC: REACTIVE M[IU]/ML
HBV SURFACE AG SERPL QL IA: NONREACTIVE
HCV AB SERPL QL IA: NONREACTIVE
HIV 1+2 AB+HIV1 P24 AG SERPL QL IA: NONREACTIVE
POTASSIUM SERPL-SCNC: 4.6 MMOL/L (ref 3.4–5.3)
PROT SERPL-MCNC: 6.5 G/DL (ref 6.4–8.3)
SODIUM SERPL-SCNC: 137 MMOL/L (ref 135–145)
TSH SERPL DL<=0.005 MIU/L-ACNC: 0.73 UIU/ML (ref 0.3–4.2)
VZV IGG SER QL IA: 2413 INDEX
VZV IGG SER QL IA: POSITIVE

## 2024-02-21 PROCEDURE — 86481 TB AG RESPONSE T-CELL SUSP: CPT

## 2024-02-21 PROCEDURE — 36415 COLL VENOUS BLD VENIPUNCTURE: CPT

## 2024-02-21 PROCEDURE — 84443 ASSAY THYROID STIM HORMONE: CPT

## 2024-02-21 PROCEDURE — 85025 COMPLETE CBC W/AUTO DIFF WBC: CPT

## 2024-02-21 PROCEDURE — 86803 HEPATITIS C AB TEST: CPT

## 2024-02-21 PROCEDURE — 86787 VARICELLA-ZOSTER ANTIBODY: CPT

## 2024-02-21 PROCEDURE — 87340 HEPATITIS B SURFACE AG IA: CPT

## 2024-02-21 PROCEDURE — 87389 HIV-1 AG W/HIV-1&-2 AB AG IA: CPT

## 2024-02-21 PROCEDURE — 80053 COMPREHEN METABOLIC PANEL: CPT

## 2024-02-21 PROCEDURE — 86706 HEP B SURFACE ANTIBODY: CPT

## 2024-02-21 PROCEDURE — 86704 HEP B CORE ANTIBODY TOTAL: CPT

## 2024-02-21 NOTE — PATIENT INSTRUCTIONS
"Recommendations from today's MTM visit:                                                    MTM (medication therapy management) is a service provided by a clinical pharmacist designed to help you get the most of out of your medicines.      Medication list updated   Once your labs result, I will send in the prescription for Mavenclad to [facility]     Follow-up: 4/3 @ 10:30AM via phone call     It was great speaking with you today.  I value your experience and would be very thankful for your time in providing feedback in our clinic survey. In the next few days, you may receive an email or text message from [employer] with a link to a survey related to your  clinical pharmacist.\"     To schedule another MTM appointment, please call the clinic directly or you may call the MTM scheduling line at [phone].    My Clinical Pharmacist's contact information:                                                      Please feel free to contact me with any questions or concerns you have.      Sushila Sung, Pharm.D., MPH  Medication Therapy Management Pharmacist   [facility]   "

## 2024-02-22 LAB
BASOPHILS # BLD AUTO: 0.1 10E3/UL (ref 0–0.2)
BASOPHILS NFR BLD AUTO: 1 %
EOSINOPHIL # BLD AUTO: 0 10E3/UL (ref 0–0.7)
EOSINOPHIL NFR BLD AUTO: 1 %
ERYTHROCYTE [DISTWIDTH] IN BLOOD BY AUTOMATED COUNT: 11.8 % (ref 10–15)
GAMMA INTERFERON BACKGROUND BLD IA-ACNC: 0.01 IU/ML
HCT VFR BLD AUTO: 42.2 % (ref 35–47)
HGB BLD-MCNC: 14.2 G/DL (ref 11.7–15.7)
IMM GRANULOCYTES # BLD: 0 10E3/UL
IMM GRANULOCYTES NFR BLD: 0 %
LYMPHOCYTES # BLD AUTO: 1.4 10E3/UL (ref 0.8–5.3)
LYMPHOCYTES NFR BLD AUTO: 27 %
M TB IFN-G BLD-IMP: NEGATIVE
M TB IFN-G CD4+ BCKGRND COR BLD-ACNC: 9.99 IU/ML
MCH RBC QN AUTO: 33.3 PG (ref 26.5–33)
MCHC RBC AUTO-ENTMCNC: 33.6 G/DL (ref 31.5–36.5)
MCV RBC AUTO: 99 FL (ref 78–100)
MITOGEN IGNF BCKGRD COR BLD-ACNC: 0.07 IU/ML
MITOGEN IGNF BCKGRD COR BLD-ACNC: 0.13 IU/ML
MONOCYTES # BLD AUTO: 0.3 10E3/UL (ref 0–1.3)
MONOCYTES NFR BLD AUTO: 6 %
NEUTROPHILS # BLD AUTO: 3.4 10E3/UL (ref 1.6–8.3)
NEUTROPHILS NFR BLD AUTO: 65 %
PLATELET # BLD AUTO: 227 10E3/UL (ref 150–450)
QUANTIFERON MITOGEN: 10 IU/ML
QUANTIFERON NIL TUBE: 0.01 IU/ML
QUANTIFERON TB1 TUBE: 0.08 IU/ML
QUANTIFERON TB2 TUBE: 0.14
RBC # BLD AUTO: 4.26 10E6/UL (ref 3.8–5.2)
WBC # BLD AUTO: 5.2 10E3/UL (ref 4–11)

## 2024-02-27 ENCOUNTER — TELEPHONE (OUTPATIENT)
Dept: NEUROLOGY | Facility: CLINIC | Age: 34
End: 2024-02-27

## 2024-02-27 ENCOUNTER — THERAPY VISIT (OUTPATIENT)
Dept: OCCUPATIONAL THERAPY | Facility: CLINIC | Age: 34
End: 2024-02-27
Attending: PSYCHIATRY & NEUROLOGY
Payer: COMMERCIAL

## 2024-02-27 DIAGNOSIS — G82.20 PARAPARESIS (H): ICD-10-CM

## 2024-02-27 DIAGNOSIS — G35 MS (MULTIPLE SCLEROSIS) (H): ICD-10-CM

## 2024-02-27 PROCEDURE — 97542 WHEELCHAIR MNGMENT TRAINING: CPT | Mod: GO | Performed by: OCCUPATIONAL THERAPIST

## 2024-02-27 RX ORDER — CLADRIBINE 10 MG/1
TABLET ORAL
Qty: 6 EACH | Refills: 0 | OUTPATIENT
Start: 2024-02-27 | End: 2024-03-05

## 2024-02-27 NOTE — PROGRESS NOTES
"                                                                             SEATING AND WHEELED MOBILITY ASSESSMENT    [facility]  Occupational Therapy   Date of service: February 27, 2024    Referring provider: Darby Sandoval MD   Order Date 2/1/24  Onset Date: 2/1/24    Order Details: w/c ten    Funding:[facility]    Others present at visit:  Child(radha)    Vendor: Malathi GARCIA from [employer]    Height/Weight: 5'9\" / 150    Medical diagnosis:   Multiple sclerosis    Patient concerns/goals: safe mobility    Living environment:  House  Duplex    Living environment barriers:  13 stairs to enter (1 railing present)      Current assistance/living environment:  Lives with 3 kids (2-12 years old), 2 with disability.    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 Yes- Daily falls      Has the patient fallen and had an injury in the past year? Yes       Timed Up and Go Score:      25 ft walk: 11.5 second for 25 ft with no AD    Is the patient a fall risk? Yes, department fall risk interventions implemented    ADL: ind    IADL: ind  Laundry on first level      Evaluation     Pain assessment:  MS hug  Occasionally in legs    Cognition:  wnl    Vision: R Eye bright light    Fatigue:  Reported Problems: whole body feels very heavy, sluggish, worse drop foot      Balance:  Unsupported Sitting Balance: Uses UE for Balance  Sitting Balance in Chair: Uses UE for Balance  Standing Balance: Uses UE for Balance    Ambulation:  Level of [place]: Independent, SBA  Assistive Device(s): Walker (four wheeled)    Transfers:   ind    Neuromuscular:    Coordination:  R side " impaired and very slowed    Tone:   Spasticity  Sensation:  Sensory Deficits Reported: hands and feet are numb    Head and Neck:  Head and Neck Position: WFL  Head Control: Good  Tone/Movement of Head and Neck: with flexion shooting tingling down legs    Upper Extremities  UE ROM: WFL  UE Strength:  4-/5 with limited   Dominance: Right    Pelvis  Anterior/Posterior Pelvis Position: Partially Flexible, Posterior Tilt  Pelvic Obliquity: WFL      Lower Extremities:  LE ROM: wfl  Pain: - none + mild ++ moderate +++ severe  Strength Scale: 0-5/5 Left Right   Ankle Dorsiflexion 4+ 4   Ankle Plantarflexion 4+ 4      Pain: - none + mild ++ moderate +++ severe  Strength Scale: 0-5/5 Left Right   Hip Flexion 4+ 4-   Hip Extension 4+ 4   Hip Abduction 5 4   Hip Adduction 5 4   Knee Flexion 5 4   Knee Extension 5      R AFO     Impairments:  Fatigue  Muscle atrophy  Coordination  Balance  Range of motion     Treatment diagnosis:  Impaired mobility  Impaired activities of daily living     Recommendations/Plan of care:  Patient would benefit from interventions to enhance safety and independence.  Occupational therapy intervention for  wheelchair management.    Goals:   Target date:   Patient, family and/or caregiver will verbalize/demonstrate understanding of compensatory methods /equipment to enhance functional independence and safety.    Educational assessment/barriers to learning:  No barriers noted    Treatment provided this date:   Wheelchair management, 45 minutes   Determine need for safe mobility due to frequent falls.  Patient needs something that could be carried into home lifted into vehicle and easily assembled all while eating and her independence with mobility especially during childcare.  Safe and independent trials today in clinic of go-go scooter while caring for her daughter.    Response to treatment/recommendations: Receptive    Goal attainment:  All goals met    Risks and benefits of evaluation/treatment  have been explained.  Patient, family and/or caregiver are in agreement with Plan of Care.     Timed Code Treatment Minutes: 45  Total Treatment Time (sum of timed and untimed services): 45    Electronically signed by:  Malathi MCLEOD, ATP      Occupational Therapist, Assistive   [phone]      fax: [phone]      [email or web address]  Seating Clinic- [facility], [address]  [address]  [place] [address]  February 27, 2024    [employer] Power Operated Vehicle / Scooter -  This device is being requested for this patient with mobility impairments to allow her to be able to complete all of her mobility related activities of daily living in a safe fashion, without risk of injury from falling, and in a reasonable time frame. She demonstrated during the home evaluation that she was able to transfer to/from the requested scooter, operate the tiller steering system, able to maintain postural stability and position while operating the POV, and operate the on/off mechanism and the speed dial appropriately and safely. They are very willing and physically / cognitively able to use the recommended equipment to assist with mobility related activities of daily living and general mobility. There is a mobility limitation that cannot be sufficiently and safely resolved by the use of an appropriately fitted cane or walker and they do not have sufficient upper extremity function to self-propel an optimally-configured manual wheelchair in their home to perform MRADLs during a typical day due to limitations in strength, endurance, range of motion, and coordination. This equipment is reasonable and necessary with reference to accepted standards of medical practice and treatment of this patient's condition and is not being recommended as a convenience item. Without this recommended equipment, she is highly likely to sustain injuries from falls, develop  pressure sores or postural compensation, and/or be bed confined, which those costs far exceed the cost of the requested equipment.    This therapist has no financial connection to the equipment being requested or vendor being used.  I have read and concur with the above recommendations.  Physician Printed Name __________________________________________  Physician Signature  _____________________________________________  Date of Signature ______________________________  Physician Phone  ______________________________

## 2024-02-27 NOTE — CONFIDENTIAL NOTE
Please confirm interval between last Ocrevus dose and when you would like her to start Mavenclad.     Thanks,     Sushila Sung, Pharm.D., MPH  Medication Therapy Management Pharmacist   [facility]

## 2024-02-28 ENCOUNTER — TELEPHONE (OUTPATIENT)
Dept: NEUROLOGY | Facility: CLINIC | Age: 34
End: 2024-02-28

## 2024-02-28 ENCOUNTER — TELEPHONE (OUTPATIENT)
Dept: NEUROLOGY | Facility: CLINIC | Age: 34
End: 2024-02-28
Payer: COMMERCIAL

## 2024-02-28 DIAGNOSIS — G35 MS (MULTIPLE SCLEROSIS) (H): ICD-10-CM

## 2024-02-28 NOTE — RESULT ENCOUNTER NOTE
Please notify pt that her baseline blood work all looks good. She can start mavenclad once approved. Darby Sandoval MD

## 2024-02-28 NOTE — TELEPHONE ENCOUNTER
----- Message from Rochelle Putnam RN sent at 2/28/2024  9:31 AM CST -----    ----- Message -----  From: Darby Sandoval MD  Sent: 2/27/2024   9:34 PM CST  To: Multiple Sclerosis Nursing Pool    Please notify pt that her baseline blood work all looks good. She can start mavenclad once approved. Darby Sandoval MD

## 2024-02-28 NOTE — TELEPHONE ENCOUNTER
PA Initiation    Medication: MAVENCLAD (6 TABS) 10 MG PO TBPK  Insurance Company: [employer] - Phone [phone] Fax [phone]  Pharmacy Filling the Rx: [place], [place] - [address] [place] [address]  Filling Pharmacy Phone:    Filling Pharmacy Fax:    Start Date: 2/28/2024          Thank you,    Romelia Guidry [facility]  Specialty Pharmacy Clinic Liaison - CardiologyNeurologyMultiple [facility] [address]  [address] [place] [address]  Ph: [phone] Fax: [phone]  [email or web address]

## 2024-02-28 NOTE — TELEPHONE ENCOUNTER
Called and left detailed voicemail for patient about her blood work and to start on the Mavenclad once approved - see below    Lyndsey Haywood on 2/28/2024 at 2:08 PM

## 2024-03-01 NOTE — TELEPHONE ENCOUNTER
Prior Authorization Approval    Medication: MAVENCLAD (6 TABS) 10 MG PO TBPK  Authorization Effective Date: 2/29/2024  Authorization Expiration Date: 2/28/2025  Approved Dose/Quantity: 6 tabs  Reference #:     Insurance Company: [employer] - Phone [phone] Fax [phone]  Expected CoPay: $    CoPay Card Available: No    Financial Assistance Needed: N/A  Which Pharmacy is filling the prescription: [address]  Pharmacy Notified: Yes  Patient Notified: Yes          Thank you,    Romelia Guidry [facility]  Specialty Pharmacy Clinic Liaison - CardiologyNeurologyMultiple [facility] [address] [address]  Ph: [phone] Fax: [phone]  [email or web address]

## 2024-03-05 RX ORDER — CLADRIBINE 10 MG/1
TABLET ORAL
Qty: 6 EACH | Refills: 0 | Status: SHIPPED | OUTPATIENT
Start: 2024-03-05 | End: 2024-03-10

## 2024-03-05 NOTE — TELEPHONE ENCOUNTER
Mavenclad re-ordered as prescription had . Plan to fill at  specialty pharmacy.    Kim Ramos, Pharm.D.  Medication Therapy Management Pharmacist  [facility]

## 2024-03-07 ENCOUNTER — THERAPY VISIT (OUTPATIENT)
Dept: PHYSICAL THERAPY | Facility: CLINIC | Age: 34
End: 2024-03-07
Attending: PSYCHIATRY & NEUROLOGY
Payer: COMMERCIAL

## 2024-03-07 DIAGNOSIS — G82.20 PARAPARESIS (H): ICD-10-CM

## 2024-03-07 DIAGNOSIS — G35 MS (MULTIPLE SCLEROSIS) (H): Primary | ICD-10-CM

## 2024-03-07 DIAGNOSIS — R25.2 SPASTICITY: ICD-10-CM

## 2024-03-07 PROCEDURE — 97110 THERAPEUTIC EXERCISES: CPT | Mod: GP

## 2024-03-13 ENCOUNTER — THERAPY VISIT (OUTPATIENT)
Dept: PHYSICAL THERAPY | Facility: CLINIC | Age: 34
End: 2024-03-13
Attending: PSYCHIATRY & NEUROLOGY
Payer: COMMERCIAL

## 2024-03-13 DIAGNOSIS — G35 MS (MULTIPLE SCLEROSIS) (H): Primary | ICD-10-CM

## 2024-03-13 PROCEDURE — 97530 THERAPEUTIC ACTIVITIES: CPT | Mod: GP

## 2024-03-13 PROCEDURE — 97110 THERAPEUTIC EXERCISES: CPT | Mod: GP

## 2024-03-14 ENCOUNTER — TELEPHONE (OUTPATIENT)
Dept: NEUROLOGY | Facility: CLINIC | Age: 34
End: 2024-03-14
Payer: COMMERCIAL

## 2024-03-14 NOTE — TELEPHONE ENCOUNTER
[facility] Call Center     Phone Message     May a detailed message be left on voicemail: yes      Reason for Call: Patient states she is returning a call. Pt requests a call back concerning letter regarding her apartment [address] to release her from her lease,  Please call patient back at [phone].      Action Taken: [facility]     Travel Screening: Not Applicable

## 2024-03-14 NOTE — TELEPHONE ENCOUNTER
[facility] Call Center    Phone Message    May a detailed message be left on voicemail: yes     Reason for Call: Patient requests a call back concerning letter to be sent to her apartment [address] to release her from her lease,  Please call patient back    Action Taken: [facility]    Travel Screening: Not Applicable                                                                 Ms.

## 2024-03-15 NOTE — TELEPHONE ENCOUNTER
Returned call to the pt. Per pt asked if we received a form/letter from her apartment. The form is a confirmation from Dr. Sandoval to make sure that the letter wasn't forged by the pt.    Per pt said that it was faxed about 3 weeks ago. I informed her that I did not locate any faxes from them and asked that she have them refax it also tried to confirm with her to see which fax number it was faxed to.    Pt is unsure which fax number it was faxed to.    Per pt will try to get the form from them and drop it off in clinic.     I told pt that she can also have them re fax the form to [phone].    Per pt will call them to see if they can refax it or she might stop by with the form on Monday to get a quick signature from Dr. Sandoval.      No other questions.      ELIZABETH Dejesus on 3/15/2024 at 10:36 AM

## 2024-03-20 RX ORDER — CLADRIBINE 10 MG/1
TABLET ORAL
COMMUNITY
End: 2024-04-03

## 2024-03-25 ENCOUNTER — MEDICAL CORRESPONDENCE (OUTPATIENT)
Dept: HEALTH INFORMATION MANAGEMENT | Facility: CLINIC | Age: 34
End: 2024-03-25
Payer: COMMERCIAL

## 2024-03-26 ENCOUNTER — DOCUMENTATION ONLY (OUTPATIENT)
Dept: NEUROLOGY | Facility: CLINIC | Age: 34
End: 2024-03-26
Payer: COMMERCIAL

## 2024-03-26 ENCOUNTER — TELEPHONE (OUTPATIENT)
Dept: NEUROLOGY | Facility: CLINIC | Age: 34
End: 2024-03-26
Payer: COMMERCIAL

## 2024-03-26 DIAGNOSIS — D84.9 IMMUNOSUPPRESSED STATUS (H): ICD-10-CM

## 2024-03-26 DIAGNOSIS — G35 MS (MULTIPLE SCLEROSIS) (H): Primary | ICD-10-CM

## 2024-03-26 DIAGNOSIS — B00.1 COLD SORE: ICD-10-CM

## 2024-03-26 NOTE — TELEPHONE ENCOUNTER
Received message from Therapy Management team at [facility]. Patient started first Mavenclad cycle on 3/17. She will start the next cycle on 4/14. Labs are due 2 months after Mavenclad was started (~5/17). Labs are not needed before second cycle of the first course (ie: before 4/14).    Scheduled follow up MTM visit for 4/3/24.    Marylou LealD.  Medication Therapy Management Pharmacist  [facility]

## 2024-03-26 NOTE — TELEPHONE ENCOUNTER
[facility] Call Center    Phone Message    May a detailed message be left on voicemail: yes     Reason for Call: Medication Question or concern regarding medication   Prescription Clarification  Name of Medication:   Mavenclad     Prescribing Provider:      Pharmacy: Darby Sandoval    What on the order needs clarification?   Pt requesting a call back to go over the medication listed above. Pt states feeling sick after completing the recommended dose.     Pt also has question about a fax from her apartment building.    Please advise      Action Taken: Other: [facility] Neurology    Travel Screening: Not Applicable

## 2024-03-26 NOTE — PROGRESS NOTES
Paperwork received from OT Malathi Roberto for Dr. Sandoval to fill out for a scooter for pt. Paperwork has been filled out and signed by Dr. Sandoval along with office visit from Dr. Sandoval and Malathi Roberto, OT.   Faxed to [facility] (Fax # [phone])

## 2024-03-27 RX ORDER — VALACYCLOVIR HYDROCHLORIDE 500 MG/1
500 TABLET, FILM COATED ORAL DAILY
Qty: 90 TABLET | Refills: 3 | Status: SHIPPED | OUTPATIENT
Start: 2024-03-30

## 2024-03-27 RX ORDER — VALACYCLOVIR HYDROCHLORIDE 500 MG/1
500 TABLET, FILM COATED ORAL 2 TIMES DAILY
Qty: 6 TABLET | Refills: 0 | Status: SHIPPED | OUTPATIENT
Start: 2024-03-27 | End: 2024-06-26

## 2024-03-27 NOTE — TELEPHONE ENCOUNTER
Pt reporting generalized flu like illness with low grade fever, body aches and fatigue and increased weakness in legs (from MS).     Had several cold sores on upper lip. Has history of cold sores, but this was severe outbreak.     All symptoms started 3/24, finished mavenclad on 3/22.     Symptoms improving slowly. Pt is asking about second cycle. She will complete labs, but is concerned about having similar symptoms or worse symptoms following second course. Please advise.     RN did advise that she treat flu like symptoms with over the counter medications and treat fever with tylenol. Dressing lightly and taking cool showers to help cool down. Also encouraged adequate hydration. Pt agreeable to plan.     Toi Mahmood RN, BSN  [facility]

## 2024-03-27 NOTE — TELEPHONE ENCOUNTER
I recommend she treat the cold sores    Valtrex treatment course sent to pharmacy   She should follow this up with the preventative dose     Advise cbc in 1 week    If she is feeling better over the course of the month, she can take round 2   Darby Sandoval MD on 3/27/2024 at 5:17 PM

## 2024-03-29 ENCOUNTER — DOCUMENTATION ONLY (OUTPATIENT)
Dept: NEUROLOGY | Facility: CLINIC | Age: 34
End: 2024-03-29
Payer: COMMERCIAL

## 2024-03-29 NOTE — TELEPHONE ENCOUNTER
Called and talked to Mary to let her know that Dr. Sandoval put in a prescription for her to treat the cold sores. I told her to give us a call back in a week to update us on how she is feeling and if she continues to feel better Dr. Sandoval recommends her to do the next dose of Mavenclad.   Pt understood and had no follow up questions.     Lyndsey Haywood on 3/29/2024 at 10:57 AM

## 2024-03-29 NOTE — PROGRESS NOTES
Received documents from [place] for lease termination. Form in Dr. Sandoval's folder to fill out and sign

## 2024-04-03 ENCOUNTER — VIRTUAL VISIT (OUTPATIENT)
Dept: NEUROLOGY | Facility: CLINIC | Age: 34
End: 2024-04-03
Attending: PSYCHIATRY & NEUROLOGY
Payer: COMMERCIAL

## 2024-04-03 DIAGNOSIS — G35 MS (MULTIPLE SCLEROSIS) (H): Primary | ICD-10-CM

## 2024-04-03 PROCEDURE — 99207 PR NO BILLABLE SERVICE THIS VISIT: CPT | Mod: 93 | Performed by: PHARMACIST

## 2024-04-03 RX ORDER — CLADRIBINE 10 MG/1
TABLET ORAL
Qty: 6 EACH | Refills: 0 | Status: SHIPPED | OUTPATIENT
Start: 2024-04-14 | End: 2024-04-19

## 2024-04-03 NOTE — PROGRESS NOTES
Medication Therapy Management (MTM) Encounter    ASSESSMENT:                            Medication Adherence/Access: No issues identified    MS:   Patient planning to continue on Mavenclad with second cycle of first course scheduled to begin on 4/14. Dr Sandoval ordered an extra CBC for patient to have drawn this week due to the recent illness. Otherwise another CBC will not be due until 5/17/24 and then again on 9/17/24 (month 2 and month 6) to monitor lymphocytes. Patient is seeing Dr. Sandoval on 5/16/24 and could have her CBC done in clinic that day.     PLAN:                            Dr. Sandoval ordered a CBC test for you to have done within the next week  As long as the CBC looks okay and you continue to feel better, we can start cycle of Mavenclad on 4/14/24. You'll take 2 tablets on the first day, followed by 1 tablet daily for 4 days.   We will recheck a CBC test around 5/17/24 and again 9/17/24.   Your next course of Mavenclad will be in 1 year (around 3/17/25).    Follow-up: ~5 months (around 9/17/24) for CBC test    SUBJECTIVE/OBJECTIVE:                          Mary Evans is a 34 year old female called for a follow-up visit.       Reason for visit: follow up on Mavenclad.    Allergies/ADRs: Reviewed in chart  Past Medical History: Reviewed in chart  Tobacco: She reports that she has never smoked. She does not have any smokeless tobacco history on file.  Alcohol: none    Medication Adherence/Access: no issues reported    MS:   - Dalfampridine 10mg twice daily  - Mavenclad- treatment course 1, cycle course 1 completed (3/17-3/21)  - valacyclovir 500 mg daily     Patient states that she tolerated the first cycle of Mavenclad well. She did come down with an illness, which she believes she caught from her daughter. She states that she can normal fight off the illnesses that her kids have but this one lingered longer than usual. She also had a bad cold sore outbreak which isn't usual for her (she is treated  with valacyclovir). She is not sure whether to attribute these changes to the Mavenclad but she is hoping to continue on therapy and do the second cycle. She is willing to have her CBC checked again- this was ordered by Dr. Sandoval.     Today's Vitals: There were no vitals taken for this visit.  ----------------    I spent 6 minutes with this patient today. All changes were made via collaborative practice agreement with Dr. Sandoval. A copy of the visit note was provided to the patient's provider(s).    A summary of these recommendations was sent via [employer].    Kim Ramos, Pharm.D.  Medication Therapy Management Pharmacist  [facility]    Telemedicine Visit Details  Type of service:  Telephone visit  Start Time:  3:00 PM  End Time: 3:06 PM     Medication Therapy Recommendations  No medication therapy recommendations to display

## 2024-04-03 NOTE — Clinical Note
4/3/2024       RE: Mary Evans  [address]  [facility] [place] [address]     Dear Colleague,    Thank you for referring your patient, Mary Evans, to the [facility] [place] [facility]. Please see a copy of my visit note below.    Medication Therapy Management (MTM) Encounter    ASSESSMENT:                            Medication Adherence/Access: {adherencechoices:090375}    ***:   ***    PLAN:                            Mavenclad Take 2 tablets by mouth daily for 1 day, THEN 1 tablet daily for 4 days     Next cycle on 4/14    Start 23-27 days after last dose     CBC again around 5/17/24    Follow-up: {followuptest2:857717}    SUBJECTIVE/OBJECTIVE:                          Mary Evans is a 34 year old female called for a follow-up visit.       Reason for visit: follow up on Mavenclad.    Allergies/ADRs: Reviewed in chart  Past Medical History: Reviewed in chart  Tobacco: She reports that she has never smoked. She does not have any smokeless tobacco history on file.  Alcohol: {ALCOHOL CONSUMPTION HX:205414}  {Social and Goals:307508}  Medication Adherence/Access: {fumedadherence:409551}    HAPPY BIRTDHAY!    CBC in 1 week- now   Cold sores improving?     Take 2 tablets by mouth daily for 1 day, THEN 1 tablet daily for 4 days       HIV nonreactive  Quantiferon TB Gold Plus negative  Hep B surface antibody reactive  Hep B surface antigen non-reactive  Hep B core antibody non-reactive  Hep C antibody non-reactive  Lymphocytes pending  Varicella antibody positive  CMP complete  TSH WNL   CBC with platelets complete; lymphocytes >800 cells/uL  Pregnancy negative   Hepatic Function WNL     Today's Vitals: There were no vitals taken for this visit.  ----------------  {MANUELA?:043585}    I spent {mtm total time 3:974429} with this patient today{MTMpartdbillingquestion:278350}. { :758249}. A copy of the visit note was provided to  "the patient's provider(s).    A summary of these recommendations {GIVEN/NOT GIVEN:566614}.    ***    Telemedicine Visit Details  Type of service:  {[facility]:588570::\"Telephone visit\"}  Start Time: {video/phone visit start time:152948}  End Time: {video/phone visit end time:152948}     Medication Therapy Recommendations  No medication therapy recommendations to display     Medication Therapy Management (MTM) Encounter    ASSESSMENT:                            Medication Adherence/Access: No issues identified    MS:   Patient planning to continue on Mavenclad with second cycle of first course scheduled to begin on 4/14. Dr Sandoval ordered an extra CBC for patient to have drawn this week due to the recent illness. Otherwise another CBC will not be due until 5/17/24 and then again on 9/17/24 (month 2 and month 6) to monitor lymphocytes.     PLAN:                            Dr. Sandoval ordered a CBC test for you to have done within the next week  As long as the CBC looks okay and you continue to feel better, we can start cycle of Mavenclad on 4/14/24. You'll take 2 tablets on the first day, followed by 1 tablet daily for 4 days.   We will recheck a CBC test around 5/17/24 and again 9/17/24.   Your next course of Mavenclad will be in 1 year (around 3/17/25).    Follow-up: ~5 months (around 9/17/24) for CBC test    SUBJECTIVE/OBJECTIVE:                          Mary Evans is a 34 year old female called for a follow-up visit.       Reason for visit: follow up on Mavenclad.    Allergies/ADRs: Reviewed in chart  Past Medical History: Reviewed in chart  Tobacco: She reports that she has never smoked. She does not have any smokeless tobacco history on file.  Alcohol: none    Medication Adherence/Access: no issues reported    MS:   - Dalfampridine 10mg twice daily  - Mavenclad- treatment course 1, cycle course 1 completed (3/17-3/21)  - valacyclovir 500 mg daily     Patient states that she tolerated the first cycle of " Mavenclad well. She did come down with an illness, which she believes she caught from her daughter. She states that she can normal fight off the illnesses that her kids have but this one lingered longer than usual. She also had a bad cold sore outbreak which isn't usual for her (she is treated with valacyclovir). She is not sure whether to attribute these changes to the Mavenclad but she is hoping to continue on therapy and do the second cycle. She is willing to have her CBC checked again- this was ordered by Dr. Sandoval.     Today's Vitals: There were no vitals taken for this visit.  ----------------    I spent 6 minutes with this patient today. All changes were made via collaborative practice agreement with Dr. Sandoval. A copy of the visit note was provided to the patient's provider(s).    A summary of these recommendations was sent via [employer].    Kim Ramos, Pharm.D.  Medication Therapy Management Pharmacist  [facility]    Telemedicine Visit Details  Type of service:  Telephone visit  Start Time:  3:00 PM  End Time: 3:06 PM     Medication Therapy Recommendations  No medication therapy recommendations to display       Again, thank you for allowing me to participate in the care of your patient.      Sincerely,    Kim Ramos RP

## 2024-04-03 NOTE — PATIENT INSTRUCTIONS
"Recommendations from today's MTM visit:                                                      Dr. Sandoval ordered a CBC test for you to have done within the next week  As long as the CBC looks okay and you continue to feel better, we can start cycle of Mavenclad on 4/14/24. You'll take 2 tablets on the first day, followed by 1 tablet daily for 4 days.   We will recheck a CBC test around 5/17/24 and again 9/17/24.   Your next course of Mavenclad will be in 1 year (around 3/17/25).    Follow-up: ~5 months (around 9/17/24) for CBC test    It was great speaking with you today.  I value your experience and would be very thankful for your time in providing feedback in our clinic survey. In the next few days, you may receive an email or text message from [employer] with a link to a survey related to your  clinical pharmacist.\"     To schedule another MTM appointment, please call the clinic directly or you may call the MTM scheduling line at [phone].    My Clinical Pharmacist's contact information:                                                      Please feel free to contact me with any questions or concerns you have.      Kim Ramos, Pharm.D.  Medication Therapy Management Pharmacist  [facility]     "

## 2024-04-04 NOTE — PROGRESS NOTES
Form filled out by Dr. Sandoval and faxed back to [phone]    Lyndsey Haywood on 4/4/2024 at 2:00 PM

## 2024-04-09 ENCOUNTER — LAB (OUTPATIENT)
Dept: LAB | Facility: CLINIC | Age: 34
End: 2024-04-09
Payer: COMMERCIAL

## 2024-04-09 DIAGNOSIS — G35 MS (MULTIPLE SCLEROSIS) (H): ICD-10-CM

## 2024-04-09 LAB
BASOPHILS # BLD AUTO: 0 10E3/UL (ref 0–0.2)
BASOPHILS NFR BLD AUTO: 1 %
EOSINOPHIL # BLD AUTO: 0 10E3/UL (ref 0–0.7)
EOSINOPHIL NFR BLD AUTO: 1 %
ERYTHROCYTE [DISTWIDTH] IN BLOOD BY AUTOMATED COUNT: 12.2 % (ref 10–15)
HCT VFR BLD AUTO: 42.6 % (ref 35–47)
HGB BLD-MCNC: 14.7 G/DL (ref 11.7–15.7)
IMM GRANULOCYTES # BLD: 0 10E3/UL
IMM GRANULOCYTES NFR BLD: 0 %
LYMPHOCYTES # BLD AUTO: 1.2 10E3/UL (ref 0.8–5.3)
LYMPHOCYTES NFR BLD AUTO: 29 %
MCH RBC QN AUTO: 33 PG (ref 26.5–33)
MCHC RBC AUTO-ENTMCNC: 34.5 G/DL (ref 31.5–36.5)
MCV RBC AUTO: 96 FL (ref 78–100)
MONOCYTES # BLD AUTO: 0.5 10E3/UL (ref 0–1.3)
MONOCYTES NFR BLD AUTO: 10 %
NEUTROPHILS # BLD AUTO: 2.6 10E3/UL (ref 1.6–8.3)
NEUTROPHILS NFR BLD AUTO: 59 %
PLATELET # BLD AUTO: 260 10E3/UL (ref 150–450)
RBC # BLD AUTO: 4.46 10E6/UL (ref 3.8–5.2)
WBC # BLD AUTO: 4.3 10E3/UL (ref 4–11)

## 2024-04-09 PROCEDURE — 85025 COMPLETE CBC W/AUTO DIFF WBC: CPT

## 2024-04-09 PROCEDURE — 36415 COLL VENOUS BLD VENIPUNCTURE: CPT

## 2024-04-10 ENCOUNTER — NURSE TRIAGE (OUTPATIENT)
Dept: NURSING | Facility: CLINIC | Age: 34
End: 2024-04-10

## 2024-04-10 ENCOUNTER — MYC MEDICAL ADVICE (OUTPATIENT)
Dept: NEUROLOGY | Facility: CLINIC | Age: 34
End: 2024-04-10
Payer: COMMERCIAL

## 2024-04-10 ENCOUNTER — HOSPITAL ENCOUNTER (EMERGENCY)
Facility: CLINIC | Age: 34
Discharge: LEFT BEFORE DISCHARGE | End: 2024-04-10
Payer: COMMERCIAL

## 2024-04-10 ENCOUNTER — NURSE TRIAGE (OUTPATIENT)
Dept: NURSING | Facility: CLINIC | Age: 34
End: 2024-04-10
Payer: COMMERCIAL

## 2024-04-10 VITALS
SYSTOLIC BLOOD PRESSURE: 125 MMHG | HEART RATE: 91 BPM | TEMPERATURE: 97.4 F | WEIGHT: 160 LBS | DIASTOLIC BLOOD PRESSURE: 83 MMHG | BODY MASS INDEX: 23.7 KG/M2 | OXYGEN SATURATION: 100 % | HEIGHT: 69 IN | RESPIRATION RATE: 22 BRPM

## 2024-04-10 ASSESSMENT — COLUMBIA-SUICIDE SEVERITY RATING SCALE - C-SSRS
6. HAVE YOU EVER DONE ANYTHING, STARTED TO DO ANYTHING, OR PREPARED TO DO ANYTHING TO END YOUR LIFE?: YES
1. IN THE PAST MONTH, HAVE YOU WISHED YOU WERE DEAD OR WISHED YOU COULD GO TO SLEEP AND NOT WAKE UP?: NO
2. HAVE YOU ACTUALLY HAD ANY THOUGHTS OF KILLING YOURSELF IN THE PAST MONTH?: NO

## 2024-04-10 NOTE — ED TRIAGE NOTES
Pt with hx of MS c/o for past 3 days unable to walk d/t worsening R leg weakness. Concerned related to new medication started by neurologist 1 month ago that lowers the immune system. Also reports haven't slept well for past few days and is now with SOB that thinks might be anxiety. Son is sick. Pt alert.     Triage Assessment (Adult)       Row Name 04/10/24 1216          Triage Assessment    Airway WDL WDL        Respiratory WDL    Respiratory WDL X;rhythm/pattern     Rhythm/Pattern, Respiratory tachypneic;shortness of breath        Skin Circulation/Temperature WDL    Skin Circulation/Temperature WDL WDL        Cardiac WDL    Cardiac WDL WDL        Peripheral/Neurovascular WDL    Peripheral Neurovascular WDL WDL        Cognitive/Neuro/Behavioral WDL    Cognitive/Neuro/Behavioral WDL X  R leg weakness x3 days     Level of Consciousness alert     Orientation oriented x 4     Speech clear;spontaneous     Mood/Behavior sad;anxious;cooperative        Ernie Coma Scale    Best Eye Response 4-->(E4) spontaneous     Best Motor Response 6-->(M6) obeys commands     Best Verbal Response 5-->(V5) oriented     Ernie Coma Scale Score 15

## 2024-04-10 NOTE — TELEPHONE ENCOUNTER
Dr. Sandoval please also see nurse triage encounter from today, pt was instructed to call 911. Please advise.     Toi Mahmood RN, BSN  [facility]

## 2024-04-10 NOTE — TELEPHONE ENCOUNTER
Message below will be reviewed by provider and addressed in mychart from today 4/10.     Toi Mahmood RN, BSN  [facility]

## 2024-04-10 NOTE — TELEPHONE ENCOUNTER
Nurse Triage SBAR    Is this a 2nd Level Triage? No    Situation: Pt needing reassurance in the ER    Background/Assessment    Pt in the ER because she was told to go into the ER due to her symptoms.    Pt calling back because she did not want to wait that long.     I reassure her she needed to be there and talked to somebody at the  to help the Pt out.    To bring the Pt back and give her something for her anxiety.      Pt was having a hard time breathing and crying a lot.     The  at the  said she would find a nurse to help the Pt out.     Reassured the pt it would be okay and she would get the help she needed din the ER.    No further action needed at this time.    Jaimie Lewis RN  Central Triage Red Flags/Med Refills    Protocol Recommended Disposition:   Home Care        Reason for Disposition   Patient's symptoms are safe to treat at home per nursing judgment    Additional Information   Negative: Sounds like a life-threatening emergency to the triager   Negative: Information only call about a Well Adult (no illness or injury)   Negative: Caller can't be reached by phone   Negative: Nursing judgment   Negative: Nursing judgment   Negative: Nursing judgment   Negative: Nursing judgment   Negative: Nursing judgment   Negative: Nursing judgment   Negative: Patient wants to be seen   Negative: Nursing judgment   Negative: Nursing judgment   Negative: Nursing judgment   Negative: Nursing judgment   Negative: Nursing judgment    Protocols used: No Protocol [id number]

## 2024-04-10 NOTE — TELEPHONE ENCOUNTER
"  Nurse Triage SBAR    Is this a 2nd Level Triage? YES, LICENSED PRACTITIONER REVIEW IS REQUIRED    Situation: Patient is asking for help managing the side effects of the new MS medication, Mavenclad.  She states this last week debilitating weakness and fatigue.  She missed a tube feeding for her daughter during the night, she was so tired.   Then had to crawl across the floor to get to her.  \"If there was a fire in the house, I would just stay here\"  Pt denies wanting to hurt herself or others.  She has 3 daughters and they \"need her\"  Patient is asked to call  911    Background:   start of new medicine    Assessment:   Patient states that she has had this weakness and fatigue for a month, \"but the last week has become unmanageable, debilitating\"  She is asking for help with the fatigue and generalized weakness.  She is due to start another dose soon.  She was so tired that she missed a feeding for her daughter during the night, \"I have not done that in over 3 years:    \"I could have just kept sleeping.  Weakness that she had to crawl on the floor to her daughter.  Patient states that she has 2 daughters in school and a 3 year old at home currently.  Patient is teary on the phone with this RN.    She thinks that she may have had a panic attack thinking about needing to get things done and take care of her children and feeling so tired and weak.  She states that she is SOB when ambulating  Denies N/V  Denies diarrhea.    Eating and drinking fine  Pain/weakness all over 5/10  with fatigue        Protocol Recommended Disposition:   Call 911    Recommendation:   Routing to Neuro to help manage side effects of weakness and fatigue  Pt is asked to call 911 and she states that she will.      Routed to provider        Reason for Disposition   SEVERE weakness (i.e., unable to walk or barely able to walk, requires support) and new-onset or worsening    Additional Information   Negative: Black or tarry bowel movements   " "Negative: Follows large amount of bleeding (e.g., from vomiting, rectum, vagina) (Exception: Small transient weakness from sight of a small amount blood.)   Negative: Heart beating < 50 beats per minute OR > 140 beats per minute   Negative: Difficulty breathing   Negative: Extra heartbeats, irregular heart beating, or heart is beating very fast (i.e., 'palpitations')    Answer Assessment - Initial Assessment Questions  1. DESCRIPTION: \"Describe how you are feeling.\"      Fatigues beyond belief.  Muscle fatigue, debilitating.    2. SEVERITY: \"How bad is it?\"  \"Can you stand and walk?\"    - MILD (0-3): Feels weak or tired, but does not interfere with work, school or normal activities.    - MODERATE (4-7): Able to stand and walk; weakness interferes with work, school, or normal activities.    - SEVERE (8-10): Unable to stand or walk; unable to do usual activities.      severe  3. ONSET: \"When did these symptoms begin?\" (e.g., hours, days, weeks, months)      After started the Mavenclad,  this last week it has been more unmanageable, unbearable.  4. CAUSE: \"What do you think is causing the weakness or fatigue?\" (e.g., not drinking enough fluids, medical problem, trouble sleeping)      Side effect of new medicaiton  5. NEW MEDICINES:  \"Have you started on any new medicines recently?\" (e.g., opioid pain medicines, benzodiazepines, muscle relaxants, antidepressants, antihistamines, neuroleptics, beta blockers)      Mavenclad  6. OTHER SYMPTOMS: \"Do you have any other symptoms?\" (e.g., chest pain, fever, cough, SOB, vomiting, diarrhea, bleeding, other areas of pain)      No chest pain,  She does have SOB, \"she is SOB at rest, maybe she is having a bit of a panic attack\"  SOB when ambulating.  ,   no vomiting or diarrhea, no bleeding.    Pain 5/10  generalized muscle weakness and extreme fatigue.  7. PREGNANCY: \"Is there any chance you are pregnant?\" \"When was your last menstrual period?\"      no    Protocols used: Weakness " (Generalized) and Fatigue-A-OH

## 2024-04-10 NOTE — TELEPHONE ENCOUNTER
Yes, patient needs to be seen in the ER. She went to the ER but left without being seen. I strongly recommend she return.     Do we know that she actually took the mavenclad/   Darby Sandoval MD on 4/10/2024 at 5:59 PM

## 2024-04-11 ENCOUNTER — TELEPHONE (OUTPATIENT)
Dept: NEUROLOGY | Facility: CLINIC | Age: 34
End: 2024-04-11
Payer: COMMERCIAL

## 2024-04-11 NOTE — TELEPHONE ENCOUNTER
Received fax to clinic from [facility] requesting chart notes that address need for power scooter. Per notes in the fax, they have documentation from the last 2 visits addressing decline, but not specifically mentioning need for power scooter. Pt has follow-up in May. Added need for this documentation to appointment notes. Fax placed in Dr Sandoval's folder.    Vidya Weinberg RN

## 2024-04-12 ENCOUNTER — TELEPHONE (OUTPATIENT)
Dept: NEUROLOGY | Facility: CLINIC | Age: 34
End: 2024-04-12
Payer: COMMERCIAL

## 2024-04-12 NOTE — TELEPHONE ENCOUNTER
Called and left message for patient, detailing the concern from Kim Ramos related patient's recent status (severe fatigue) and the appropriateness of re-dosing the Mavenclad (currently scheduled to start this re-dose on 4/14).    Writer advised patient to call back to clinic to provide an update on how she is currently doing so that we might advise on her medication care plan.    Be Cruz RN, BSN  [facility]

## 2024-04-12 NOTE — TELEPHONE ENCOUNTER
Patient is due for next cycle of Mavenclad to start on Sunday 4/14 but she had called RN triage on 4/10 due to severe fatigue and went to the ER; however, she did left the ER due to long wait time. I will have our neurology RN team check in with patient today to see how she is doing and determine whether to re-dose Mavenclad over the weekend.    Kim Ramos, Pharm.D.  Medication Therapy Management Pharmacist  [facility]

## 2024-04-16 ENCOUNTER — TELEPHONE (OUTPATIENT)
Dept: NEUROLOGY | Facility: CLINIC | Age: 34
End: 2024-04-16
Payer: COMMERCIAL

## 2024-04-16 NOTE — TELEPHONE ENCOUNTER
Spoke with pt to confirm the 4/23 at noon appt at [facility] location, in person. Pt did clarify that she has already started the second round of Mavenclad, currently on day 3. She is still weak, but no increase in weakness since she started the second round.     Vidya Weinberg RN

## 2024-04-16 NOTE — TELEPHONE ENCOUNTER
Called pt back to offer the 12pm appointment at [facility] on Tuesday the 23rd per Dr. Sandoval. - A hold has been placed for this pt at this time.     Lyndsey Haywood on 4/16/2024 at 2:35 PM

## 2024-04-16 NOTE — TELEPHONE ENCOUNTER
Care team has been in communication with patient via [employer] message (see 4/12 encounter from Kim Ramos).    Be Cruz RN, BSN  [facility]

## 2024-04-16 NOTE — TELEPHONE ENCOUNTER
Can you reach out to her and offer her an appointment with me next Tuesday at noon?   I understand her hesitation to go ahead with round 2 of mavenclad, but I need to see her to best determine how to proceed    If you are unable to connect with her, can you reach out to an emergency contact?   Darby Sandoval MD on 4/16/2024 at 1:04 PM

## 2024-04-16 NOTE — TELEPHONE ENCOUNTER
[facility] Call Center    Phone Message    May a detailed message be left on voicemail: yes     Reason for Call: Pt is requesting a call back from a care team member. Pt states to writer that she received a call offering her a sooner appt with the provider for 4/23/24 at 12:00pm. Writer was unable to locate a message from care team or appointment that Pt was referencing.     Please call Pt to schedule and advise at [phone]    Action Taken: Message routed to:  Other: [facility]     Travel Screening: Not Applicable

## 2024-04-17 NOTE — TELEPHONE ENCOUNTER
Thank you for the updates     She can proceed with mavenclad if she has already started it     Darby Sandoval MD on 4/17/2024 at 7:53 AM

## 2024-04-30 NOTE — TELEPHONE ENCOUNTER
Dr Sandoval has added addendum to 2/12/2024 visit note. Addended note faxed back to [facility] at [phone].    iVdya Weinberg RN

## 2024-05-05 ENCOUNTER — HEALTH MAINTENANCE LETTER (OUTPATIENT)
Age: 34
End: 2024-05-05

## 2024-05-16 ENCOUNTER — TELEPHONE (OUTPATIENT)
Dept: NEUROLOGY | Facility: CLINIC | Age: 34
End: 2024-05-16

## 2024-05-16 DIAGNOSIS — Z51.81 ENCOUNTER FOR THERAPEUTIC DRUG MONITORING: Primary | ICD-10-CM

## 2024-05-17 NOTE — CONFIDENTIAL NOTE
Informed Mary that she is due for labs and can go to any [facility] clinic to have those drawn.     Sushila Sung, Pharm.D., MPH  Medication Therapy Management Pharmacist   [facility]

## 2024-05-17 NOTE — TELEPHONE ENCOUNTER
Order signed   Yes, she was supposed to come into clinic and missed her appt     Thanks   Darby Sandoval MD on 5/17/2024 at 6:37 AM

## 2024-05-29 ENCOUNTER — LAB (OUTPATIENT)
Dept: LAB | Facility: CLINIC | Age: 34
End: 2024-05-29
Payer: COMMERCIAL

## 2024-05-29 DIAGNOSIS — Z51.81 ENCOUNTER FOR THERAPEUTIC DRUG MONITORING: ICD-10-CM

## 2024-05-29 LAB
BASOPHILS # BLD AUTO: 0 10E3/UL (ref 0–0.2)
BASOPHILS NFR BLD AUTO: 0 %
EOSINOPHIL # BLD AUTO: 0 10E3/UL (ref 0–0.7)
EOSINOPHIL NFR BLD AUTO: 0 %
ERYTHROCYTE [DISTWIDTH] IN BLOOD BY AUTOMATED COUNT: 12.6 % (ref 10–15)
HCT VFR BLD AUTO: 42.2 % (ref 35–47)
HGB BLD-MCNC: 14.3 G/DL (ref 11.7–15.7)
IMM GRANULOCYTES # BLD: 0 10E3/UL
IMM GRANULOCYTES NFR BLD: 0 %
LYMPHOCYTES # BLD AUTO: 0.8 10E3/UL (ref 0.8–5.3)
LYMPHOCYTES NFR BLD AUTO: 19 %
MCH RBC QN AUTO: 32.9 PG (ref 26.5–33)
MCHC RBC AUTO-ENTMCNC: 33.9 G/DL (ref 31.5–36.5)
MCV RBC AUTO: 97 FL (ref 78–100)
MONOCYTES # BLD AUTO: 0.4 10E3/UL (ref 0–1.3)
MONOCYTES NFR BLD AUTO: 9 %
NEUTROPHILS # BLD AUTO: 2.9 10E3/UL (ref 1.6–8.3)
NEUTROPHILS NFR BLD AUTO: 72 %
PLATELET # BLD AUTO: 199 10E3/UL (ref 150–450)
RBC # BLD AUTO: 4.34 10E6/UL (ref 3.8–5.2)
WBC # BLD AUTO: 4.1 10E3/UL (ref 4–11)

## 2024-05-29 PROCEDURE — 36415 COLL VENOUS BLD VENIPUNCTURE: CPT

## 2024-05-29 PROCEDURE — 85025 COMPLETE CBC W/AUTO DIFF WBC: CPT

## 2024-06-26 ENCOUNTER — LAB (OUTPATIENT)
Dept: LAB | Facility: CLINIC | Age: 34
End: 2024-06-26
Payer: COMMERCIAL

## 2024-06-26 ENCOUNTER — OFFICE VISIT (OUTPATIENT)
Dept: NEUROLOGY | Facility: CLINIC | Age: 34
End: 2024-06-26
Attending: PSYCHIATRY & NEUROLOGY
Payer: COMMERCIAL

## 2024-06-26 VITALS
WEIGHT: 158.1 LBS | BODY MASS INDEX: 23.35 KG/M2 | SYSTOLIC BLOOD PRESSURE: 118 MMHG | OXYGEN SATURATION: 96 % | HEART RATE: 87 BPM | DIASTOLIC BLOOD PRESSURE: 81 MMHG

## 2024-06-26 DIAGNOSIS — G35 MS (MULTIPLE SCLEROSIS) (H): ICD-10-CM

## 2024-06-26 DIAGNOSIS — R26.81 GAIT INSTABILITY: ICD-10-CM

## 2024-06-26 DIAGNOSIS — G35 MS (MULTIPLE SCLEROSIS) (H): Primary | ICD-10-CM

## 2024-06-26 DIAGNOSIS — R53.82 CHRONIC FATIGUE: ICD-10-CM

## 2024-06-26 DIAGNOSIS — R25.2 SPASTICITY: ICD-10-CM

## 2024-06-26 LAB
BASOPHILS # BLD AUTO: 0 10E3/UL (ref 0–0.2)
BASOPHILS NFR BLD AUTO: 1 %
EOSINOPHIL # BLD AUTO: 0 10E3/UL (ref 0–0.7)
EOSINOPHIL NFR BLD AUTO: 0 %
ERYTHROCYTE [DISTWIDTH] IN BLOOD BY AUTOMATED COUNT: 12.2 % (ref 10–15)
HCT VFR BLD AUTO: 43.4 % (ref 35–47)
HGB BLD-MCNC: 14.8 G/DL (ref 11.7–15.7)
IMM GRANULOCYTES # BLD: 0 10E3/UL
IMM GRANULOCYTES NFR BLD: 0 %
LYMPHOCYTES # BLD AUTO: 0.8 10E3/UL (ref 0.8–5.3)
LYMPHOCYTES NFR BLD AUTO: 19 %
MCH RBC QN AUTO: 32.9 PG (ref 26.5–33)
MCHC RBC AUTO-ENTMCNC: 34.1 G/DL (ref 31.5–36.5)
MCV RBC AUTO: 96 FL (ref 78–100)
MONOCYTES # BLD AUTO: 0.4 10E3/UL (ref 0–1.3)
MONOCYTES NFR BLD AUTO: 8 %
NEUTROPHILS # BLD AUTO: 3.1 10E3/UL (ref 1.6–8.3)
NEUTROPHILS NFR BLD AUTO: 72 %
NRBC # BLD AUTO: 0 10E3/UL
NRBC BLD AUTO-RTO: 0 /100
PLATELET # BLD AUTO: 227 10E3/UL (ref 150–450)
RBC # BLD AUTO: 4.5 10E6/UL (ref 3.8–5.2)
WBC # BLD AUTO: 4.3 10E3/UL (ref 4–11)

## 2024-06-26 PROCEDURE — 99214 OFFICE O/P EST MOD 30 MIN: CPT | Performed by: PSYCHIATRY & NEUROLOGY

## 2024-06-26 PROCEDURE — 36415 COLL VENOUS BLD VENIPUNCTURE: CPT | Performed by: PATHOLOGY

## 2024-06-26 PROCEDURE — 85025 COMPLETE CBC W/AUTO DIFF WBC: CPT | Performed by: PATHOLOGY

## 2024-06-26 PROCEDURE — G0463 HOSPITAL OUTPT CLINIC VISIT: HCPCS | Performed by: PSYCHIATRY & NEUROLOGY

## 2024-06-26 RX ORDER — METHYLPHENIDATE HYDROCHLORIDE 10 MG/1
10 TABLET ORAL 2 TIMES DAILY
Qty: 60 TABLET | Refills: 0 | Status: SHIPPED | OUTPATIENT
Start: 2024-06-26 | End: 2024-07-11

## 2024-06-26 ASSESSMENT — PAIN SCALES - GENERAL: PAINLEVEL: MODERATE PAIN (4)

## 2024-06-26 NOTE — PATIENT INSTRUCTIONS
Blood work today    Mri in 6 months     Visit with OT and rehab medicine     Follow up in 6 months

## 2024-06-26 NOTE — NURSING NOTE
Chief Complaint   Patient presents with    MS    RECHECK     MS follow up      Vitals were taken and medications were reconciled.   Son Gonzalez, EMT  8:50 AM

## 2024-06-26 NOTE — PROGRESS NOTES
Date of Service: 6/26/2024    [facility] Neurology   MS Clinic Evaluation    Subjective: 33 y/o woman who presents for evaluation of MS     Since her last visit with me there have been a number of events.  She started Mavenclad.  She took her first round in March.  Few weeks into her treatment she experienced significant fatigue to the point that she could not walk.  She simply did not have energy or desire to even get off of the couch.  She states that this is similar to what happens when she has a fever or is sick, but she did not have a fever at the time.  It lasted a few days and then gradually resolved.  She did present to the ER for evaluation, but the wait was too long and she elected to return home.  She ended up taking the second round of Mavenclad in April.  She is generally tolerated the treatment, though does generally feel weaker.    She is utilizing a walker and scooter to prevent falls and for energy conservation.  She has an AFO and is wearing her right AFO today.  She also has a cane to utilize.  She states that she has close calls on a daily basis, but will fall approximately 2 times per week.  1 of these falls recently led to an ER visit.  After discussion it seems that the falls occur when she is not utilizing a gait aid.    She estimates that gait is limited to approximately 50 yards before her right leg gets too tired.    She is very stiff in the morning.    She has not had any major infections since starting Mavenclad.    She is taking vitamin D3 5000 international unit(s) daily.     Disease onset: age 17, cervical sensory myelitis, R ON   LR uncertain     DMD hx:   Avonex 2008, brief, suffered severe flu side effects  betaseron 2008 x several months, radiologic progression   Copaxone several years, interrupted for pregnancy  Copaxone 40 mg - developed hives  Gilenya 2016 - discontinued for pregnancy  Copaxone 20 mg consistently [id number], radiologic progression   tecfidera 2019   ocrevus  2019-present, LD 11/1/2023, clinical and radiologic progression   Mavenclad 3/2024-present, LD 4/2024      No Known Allergies    Current Outpatient Medications   Medication Sig Dispense Refill    dalfampridine (AMPYRA) 10 MG TB12 12 hr tablet Take 1 tablet (10 mg) by mouth 2 times daily 60 tablet 11    valACYclovir (VALTREX) 500 MG tablet Take 1 tablet (500 mg) by mouth daily 90 tablet 3    venlafaxine (EFFEXOR-ER) 225 MG 24 hr tablet Take 225 mg by mouth daily      gabapentin (NEURONTIN) 300 MG capsule Take 1 capsule (300 mg) by mouth at bedtime 30 capsule 5    lurasidone (LATUDA) 20 MG TABS tablet Take 30 mg by mouth daily      MAGNESIUM CITRATE PO Take by mouth daily OTC product; patient does not know dose      ondansetron (ZOFRAN ODT) 8 MG ODT tab Take 8 mg by mouth every 8 hours as needed for nausea      PNV62/FA/OM3/DHA/EPA/FISH OIL (PRENATAL GUMMY ORAL) [PNV62/FA/OM3/DHA/EPA/FISH OIL (PRENATAL GUMMY ORAL)] Take 2 tablets by mouth daily.      valACYclovir (VALTREX) 1000 MG tablet [VALACYCLOVIR (VALTREX) 1000 MG TABLET] Take 1 tablet (1,000 mg total) by mouth daily. 5 tablet 10    valACYclovir (VALTREX) 500 MG tablet Take 1 tablet (500 mg) by mouth 2 times daily for 3 days 6 tablet 0    VITAMIN D, CHOLECALCIFEROL, PO Take by mouth daily OTC product; patient does not know dose       No current facility-administered medications for this visit.        Past medical, surgical, social and family history was personally reviewed. Pertinent details noted above.     Physical Examination:   /81 (BP Location: Left arm, Patient Position: Sitting, Cuff Size: Adult Regular)   Pulse 87   Wt 71.7 kg (158 lb 1.6 oz)   SpO2 96%   BMI 23.35 kg/m      General: no acute distress  Cranial nerves:   EOM full w/no LUIS CARLOS   Face symmetric  Hearing intact  No dysarthria   Motor:   Tone is mildly increased in the lower extremities  Bulk is normal     R L  Deltoid  5 5  Biceps  5 5  Triceps 5 5  Wrist ext 5 5  Finger ext 5 5  Finger  abd 5 5    Hip flexion 4- 4+   Knee flexion 4 5  Knee ext 5- 5  Ankle d/f 4+ 5    Reflexes: 1+ and symmetric throughout, babinski present on the left  Sensory: vibration is severely reduced in the toes, moderately reduced in the ankles, JPS moderately reduced  Coordination: mild ataxia of the LE  Gait: spastic parapretic gait with some scissoring    Tests/Imaging:     Vitamin D 57.7  JCV Ab neg    ALC 1000 -> 1000  Igg 749 -> 802    MRI Brain  2019 - low lesion burden, multiple small pvl, few jcl  1/2024 - new R parietal lesion    MRI Cervical spine   2018 - study limited by motion artifact, multiple eccentric cord lesions in upper cervical cord with large lesion dorsal cord c2  1/2024 - no new lesions    MRI Thoracic spine   2019 - images done, but no t2 images available for personal review  1/2024 - multiple lesions noted    Assessment: 33 y/o woman with relapsing remitting MS. she suffered a clinical and radiologic decline while on Ocrevus.  She is now status post Mavenclad.  She tolerated treatment, but struggled with significant fatigue.    She would like to resume methylphenidate for fatigue.  This was ordered today.  She does continue to take dalfampridine to aid with walking.    Regardless, she is at very high risk for falls.  Recommended that she visit with rehab medicine.  I also counseled her on the importance of always using some sort of gait aid.  We also reviewed the importance of excepting help from others.    Recall that she had an adverse response when taking medication for restless legs.  I had initially prescribed pramipexole, but she suffered suicidal ideation.  This likely occurred while weaning off of pramipexole and starting gabapentin, but she is elected to stop each of these medications altogether.    Plan:   -CBC today  - Resume methylphenidate  - Continue dalfampridine  - MRI in 6 months  - Rehab referral  - OT referral  - Follow-up in 6 months    Note was completed with the assistance of  Dragon Fluency software which can often result in accidental word substitutions.     30 minutes spent in the care of this patient on the date of service.  Darby Sandoval MD on 6/26/2024 at 9:10 AM

## 2024-06-26 NOTE — LETTER
6/26/2024       RE: Mary Evans  [address]  [place] [address]     Dear Colleague,    Thank you for referring your patient, Mary Evans, to the [facility]IPLE [facility]IC [place] at [facility]. Please see a copy of my visit note below.    Date of Service: 6/26/2024    [facility] Neurology   MS Clinic Evaluation    Subjective: 35 y/o woman who presents for evaluation of MS     Since her last visit with me there have been a number of events.  She started Mavenclad.  She took her first round in March.  Few weeks into her treatment she experienced significant fatigue to the point that she could not walk.  She simply did not have energy or desire to even get off of the couch.  She states that this is similar to what happens when she has a fever or is sick, but she did not have a fever at the time.  It lasted a few days and then gradually resolved.  She did present to the ER for evaluation, but the wait was too long and she elected to return home.  She ended up taking the second round of Mavenclad in April.  She is generally tolerated the treatment, though does generally feel weaker.    She is utilizing a walker and scooter to prevent falls and for energy conservation.  She has an AFO and is wearing her right AFO today.  She also has a cane to utilize.  She states that she has close calls on a daily basis, but will fall approximately 2 times per week.  1 of these falls recently led to an ER visit.  After discussion it seems that the falls occur when she is not utilizing a gait aid.    She estimates that gait is limited to approximately 50 yards before her right leg gets too tired.    She is very stiff in the morning.    She has not had any major infections since starting Mavenclad.    She is taking vitamin D3 5000 international unit(s) daily.     Disease onset: age 17, cervical sensory myelitis, R ON   LR uncertain     DMD hx:    Avonex 2008, brief, suffered severe flu side effects  betaseron 2008 x several months, radiologic progression   Copaxone several years, interrupted for pregnancy  Copaxone 40 mg - developed hives  Gilenya 2016 - discontinued for pregnancy  Copaxone 20 mg consistently [id number], radiologic progression   tecfidera 2019   ocrevus 2019-present, LD 11/1/2023, clinical and radiologic progression   Mavenclad 3/2024-present, LD 4/2024      No Known Allergies    Current Outpatient Medications   Medication Sig Dispense Refill    dalfampridine (AMPYRA) 10 MG TB12 12 hr tablet Take 1 tablet (10 mg) by mouth 2 times daily 60 tablet 11    valACYclovir (VALTREX) 500 MG tablet Take 1 tablet (500 mg) by mouth daily 90 tablet 3    venlafaxine (EFFEXOR-ER) 225 MG 24 hr tablet Take 225 mg by mouth daily      gabapentin (NEURONTIN) 300 MG capsule Take 1 capsule (300 mg) by mouth at bedtime 30 capsule 5    lurasidone (LATUDA) 20 MG TABS tablet Take 30 mg by mouth daily      MAGNESIUM CITRATE PO Take by mouth daily OTC product; patient does not know dose      ondansetron (ZOFRAN ODT) 8 MG ODT tab Take 8 mg by mouth every 8 hours as needed for nausea      PNV62/FA/OM3/DHA/EPA/FISH OIL (PRENATAL GUMMY ORAL) [PNV62/FA/OM3/DHA/EPA/FISH OIL (PRENATAL GUMMY ORAL)] Take 2 tablets by mouth daily.      valACYclovir (VALTREX) 1000 MG tablet [VALACYCLOVIR (VALTREX) 1000 MG TABLET] Take 1 tablet (1,000 mg total) by mouth daily. 5 tablet 10    valACYclovir (VALTREX) 500 MG tablet Take 1 tablet (500 mg) by mouth 2 times daily for 3 days 6 tablet 0    VITAMIN D, CHOLECALCIFEROL, PO Take by mouth daily OTC product; patient does not know dose       No current facility-administered medications for this visit.        Past medical, surgical, social and family history was personally reviewed. Pertinent details noted above.     Physical Examination:   /81 (BP Location: Left arm, Patient Position: Sitting, Cuff Size: Adult Regular)   Pulse 87   Wt  71.7 kg (158 lb 1.6 oz)   SpO2 96%   BMI 23.35 kg/m      General: no acute distress  Cranial nerves:   EOM full w/no LUIS CARLOS   Face symmetric  Hearing intact  No dysarthria   Motor:   Tone is mildly increased in the lower extremities  Bulk is normal     R L  Deltoid  5 5  Biceps  5 5  Triceps 5 5  Wrist ext 5 5  Finger ext 5 5  Finger abd 5 5    Hip flexion 4- 4+   Knee flexion 4 5  Knee ext 5- 5  Ankle d/f 4+ 5    Reflexes: 1+ and symmetric throughout, babinski present on the left  Sensory: vibration is severely reduced in the toes, moderately reduced in the ankles, JPS moderately reduced  Coordination: mild ataxia of the LE  Gait: spastic parapretic gait with some scissoring    Tests/Imaging:     Vitamin D 57.7  JCV Ab neg    ALC 1000 -> 1000  Igg 749 -> 802    MRI Brain  2019 - low lesion burden, multiple small pvl, few jcl  1/2024 - new R parietal lesion    MRI Cervical spine   2018 - study limited by motion artifact, multiple eccentric cord lesions in upper cervical cord with large lesion dorsal cord c2  1/2024 - no new lesions    MRI Thoracic spine   2019 - images done, but no t2 images available for personal review  1/2024 - multiple lesions noted    Assessment: 35 y/o woman with relapsing remitting MS. she suffered a clinical and radiologic decline while on Ocrevus.  She is now status post Mavenclad.  She tolerated treatment, but struggled with significant fatigue.    She would like to resume methylphenidate for fatigue.  This was ordered today.  She does continue to take dalfampridine to aid with walking.    Regardless, she is at very high risk for falls.  Recommended that she visit with rehab medicine.  I also counseled her on the importance of always using some sort of gait aid.  We also reviewed the importance of excepting help from others.    Recall that she had an adverse response when taking medication for restless legs.  I had initially prescribed pramipexole, but she suffered suicidal ideation.  This  likely occurred while weaning off of pramipexole and starting gabapentin, but she is elected to stop each of these medications altogether.    Plan:   -CBC today  - Resume methylphenidate  - Continue dalfampridine  - MRI in 6 months  - Rehab referral  - OT referral  - Follow-up in 6 months    Note was completed with the assistance of Dragon Fluency software which can often result in accidental word substitutions.     30 minutes spent in the care of this patient on the date of service.  Darby Sandoval MD on 6/26/2024 at 9:10 AM      Again, thank you for allowing me to participate in the care of your patient.      Sincerely,    Darby Sandoval MD

## 2024-06-28 ENCOUNTER — TELEPHONE (OUTPATIENT)
Dept: NEUROLOGY | Facility: CLINIC | Age: 34
End: 2024-06-28
Payer: COMMERCIAL

## 2024-06-28 NOTE — CONFIDENTIAL NOTE
PharmD Outbound Call:     Reason for call:  missed MTM appointment    Call attempt: no answer left VM and sent Priv[employer] message     Sushila Sung Pharm.D., MPH  Medication Therapy Management Pharmacist   [facility]

## 2024-07-08 ENCOUNTER — MYC MEDICAL ADVICE (OUTPATIENT)
Dept: NEUROLOGY | Facility: CLINIC | Age: 34
End: 2024-07-08
Payer: COMMERCIAL

## 2024-07-08 DIAGNOSIS — R53.82 CHRONIC FATIGUE: ICD-10-CM

## 2024-07-08 DIAGNOSIS — G35 MS (MULTIPLE SCLEROSIS) (H): ICD-10-CM

## 2024-07-11 RX ORDER — METHYLPHENIDATE HYDROCHLORIDE 20 MG/1
20 TABLET ORAL 2 TIMES DAILY
Qty: 60 TABLET | Refills: 0 | Status: SHIPPED | OUTPATIENT
Start: 2024-07-11 | End: 2024-07-30

## 2024-07-16 ENCOUNTER — DOCUMENTATION ONLY (OUTPATIENT)
Dept: NEUROLOGY | Facility: CLINIC | Age: 34
End: 2024-07-16
Payer: COMMERCIAL

## 2024-07-16 NOTE — PROGRESS NOTES
Medical opinion has been received from Clemencia Keith at [place], forms are going to be filled out then placed in Dr. Sandoval's folder for review and signature.   Son Gonzalez EMT July 16, 2024

## 2024-07-18 NOTE — CONFIDENTIAL NOTE
Disability paperwork prepped and placed in Dr. Sandoval's folder for signature.    Rochelle Putnam RN

## 2024-07-25 NOTE — PROGRESS NOTES
Medical opinion orders have been signed and faxed back at [phone].  Son Gonzalez EMT July 25, 2024

## 2024-07-29 ENCOUNTER — MYC MEDICAL ADVICE (OUTPATIENT)
Dept: NEUROLOGY | Facility: CLINIC | Age: 34
End: 2024-07-29
Payer: COMMERCIAL

## 2024-07-29 DIAGNOSIS — R53.82 CHRONIC FATIGUE: ICD-10-CM

## 2024-07-29 DIAGNOSIS — G35 MS (MULTIPLE SCLEROSIS) (H): ICD-10-CM

## 2024-07-30 RX ORDER — METHYLPHENIDATE HYDROCHLORIDE 20 MG/1
20 TABLET ORAL 3 TIMES DAILY
Qty: 90 TABLET | Refills: 0 | Status: SHIPPED | OUTPATIENT
Start: 2024-07-30 | End: 2024-09-17

## 2024-08-01 ENCOUNTER — OFFICE VISIT (OUTPATIENT)
Dept: PHYSICAL MEDICINE AND REHAB | Facility: CLINIC | Age: 34
End: 2024-08-01
Payer: COMMERCIAL

## 2024-08-01 VITALS
SYSTOLIC BLOOD PRESSURE: 117 MMHG | RESPIRATION RATE: 16 BRPM | DIASTOLIC BLOOD PRESSURE: 86 MMHG | HEART RATE: 80 BPM | OXYGEN SATURATION: 98 %

## 2024-08-01 DIAGNOSIS — N31.9 NEUROGENIC BLADDER: ICD-10-CM

## 2024-08-01 DIAGNOSIS — G35 MS (MULTIPLE SCLEROSIS) (H): ICD-10-CM

## 2024-08-01 DIAGNOSIS — M62.838 SPASM OF MUSCLE: ICD-10-CM

## 2024-08-01 DIAGNOSIS — R25.2 SPASTICITY: Primary | ICD-10-CM

## 2024-08-01 DIAGNOSIS — R26.9 ABNORMAL GAIT: ICD-10-CM

## 2024-08-01 DIAGNOSIS — R26.81 GAIT INSTABILITY: ICD-10-CM

## 2024-08-01 PROCEDURE — 99205 OFFICE O/P NEW HI 60 MIN: CPT | Performed by: PHYSICAL MEDICINE & REHABILITATION

## 2024-08-01 RX ORDER — TOLTERODINE TARTRATE 2 MG/1
2 TABLET, EXTENDED RELEASE ORAL DAILY
Qty: 30 TABLET | Refills: 1 | Status: SHIPPED | OUTPATIENT
Start: 2024-08-01

## 2024-08-01 ASSESSMENT — PAIN SCALES - GENERAL: PAINLEVEL: MODERATE PAIN (4)

## 2024-08-01 NOTE — LETTER
2024       RE: Mary Evans  [address]  [place] [address]     Dear Colleague,    Thank you for referring your patient, Mary Evans, to the [facility] PHYSICAL MEDICINE AND REHABILITATION CLINIC [place] at [facility]. Please see a copy of my visit note below.    CC: I am seeing this patient at the request of Dr. Sandoval for evaluation of spasticity and possible treatment of Botox.    Patient is a very pleasant 34-year-old woman who reports having MS for the past 15 years.  She has a limiting relapsing course and experiences significant fatigue.  She has experienced tightness especially in her lower extremities and clumsiness and difficulty using her hands for fine motor tasks.  She has a right ankle-foot orthosis that she sometimes finds it difficult to wear and is not wearing it today.  She has had falls one of them was when she was carrying a laundry basket up the stairs.  She also reports urgency and difficulty controlling her urination.  She denies any bowel issues.  She has been taking Ritalin to deal with her fatigue.  She would like to improve the tightness in her toes ankles as well as the knees.  She denies any particular area of pain or discomfort.    Past Medical History:   Diagnosis Date    Multiple sclerosis (H)      Past Surgical History:   Procedure Laterality Date    C/SECTION, LOW TRANSVERSE  2016    breech / dr tala moreno / [facility]      SECTION      DILATION AND CURETTAGE      HYSTERECTOMY, TOTAL, ROBOT-ASSISTED, USING DA AMADO XI, WITH SALPINGECTOMY Bilateral 10/31/2022    Procedure: ROBOTIC TOTAL LAPAROSCOPIC HYSTERECTOMY, BILATERAL SALPINGECTOMY, CYSTOSCOPY;  Surgeon: Tala Moreno DO;  Location: [facility] Main OR    OTHER SURGICAL HISTORY      uterine septum revision     Family History       Problem (# of Occurrences) Relation (Name,Age of Onset)    Cancer (2) Maternal  Grandfather: lung, Paternal Grandfather: lung    Urolithiasis (1) Mother           Social History     Socioeconomic History    Marital status: Single     Spouse name: Not on file    Number of children: Not on file    Years of education: Not on file    Highest education level: Not on file   Occupational History    Not on file   Tobacco Use    Smoking status: Never    Smokeless tobacco: Not on file   Substance and Sexual Activity    Alcohol use: No    Drug use: No    Sexual activity: Yes     Partners: Male   Other Topics Concern    Not on file   Social History Narrative    Not on file     Social Determinants of Health     Financial Resource Strain: Not on file   Food Insecurity: Not on file   Transportation Needs: Not on file   Physical Activity: Not on file   Stress: Not on file   Social Connections: Not on file   Interpersonal Safety: Not on file   Housing Stability: Not on file       Current Outpatient Medications   Medication Sig Dispense Refill    dalfampridine (AMPYRA) 10 MG TB12 12 hr tablet Take 1 tablet (10 mg) by mouth 2 times daily 60 tablet 11    methylphenidate (RITALIN) 20 MG tablet Take 1 tablet (20 mg) by mouth 3 times daily 90 tablet 0    valACYclovir (VALTREX) 500 MG tablet Take 1 tablet (500 mg) by mouth daily 90 tablet 3    venlafaxine (EFFEXOR-ER) 225 MG 24 hr tablet Take 225 mg by mouth daily         /86   Pulse 80   Resp 16   SpO2 98%      On examination, patient is alert and cooperative.  Vitals are stable.  She is afebrile.  HEENT is negative.  Extraocular movements are intact.  Face is symmetric.  Tongue is midline neck is supple.  She is able to move her upper extremities functionally.  She has some difficulty with hand use.  Strength is full.  In the lower extremities she is able to lift her lower extremities left side better than the right side.  Right hip flexor strength is in the 4- out of 5.  She is able to extend the knees and flex again right side weaker than the left.  He  has difficulty with ankle dorsiflexion on the right compared to the left.  There is increased tone in the flexor digitorum longus bilaterally gastrocnemius right worse than the left as well as some hamstring involvement right worse than the left.  I could not elicit a catch in her quadriceps with knee flexion.    Romberg is positive.  Gait is characterized by spasticity and some scissoring.  There is toe flexion noted as above.  Finger stenosis of on the left upper extremity compared to the right.    Impression: At this point this 34-year-old woman with multiple sclerosis affecting sensation and spasticity as well as causing fatigue and bladder disturbance.  She has had occasional falls especially when she is carrying stuff over uneven surface.  We talked about proper lighting, and also potential for improving bladder function to decrease falls.  Detrol 2 mg in the morning might be helpful in reducing the urgency during the daytime.  I will also recommend going with neurotoxin and will order 300 units of Botox to treat the various muscles on either side of the lower extremities.  I reviewed the benefits and risks of the procedure including onset within 1 to 3 days, peak in 2 weeks and a duration of 12 weeks.  It may take 1-3 rounds to optimize the site as well as the dosage.  She is excited to go with these recommendations.  I will see her in follow-up in 2 weeks for Botox and again 4 weeks after that for a follow-up.    60 minutes visit, greater than 50% was for counseling on above-mentioned issues.          Again, thank you for allowing me to participate in the care of your patient.      Sincerely,    Solo Rothman MD

## 2024-08-01 NOTE — PROGRESS NOTES
CC: I am seeing this patient at the request of Dr. Sandoval for evaluation of spasticity and possible treatment of Botox.    Patient is a very pleasant 34-year-old woman who reports having MS for the past 15 years.  She has a limiting relapsing course and experiences significant fatigue.  She has experienced tightness especially in her lower extremities and clumsiness and difficulty using her hands for fine motor tasks.  She has a right ankle-foot orthosis that she sometimes finds it difficult to wear and is not wearing it today.  She has had falls one of them was when she was carrying a laundry basket up the stairs.  She also reports urgency and difficulty controlling her urination.  She denies any bowel issues.  She has been taking Ritalin to deal with her fatigue.  She would like to improve the tightness in her toes ankles as well as the knees.  She denies any particular area of pain or discomfort.    Past Medical History:   Diagnosis Date    Multiple sclerosis (H)      Past Surgical History:   Procedure Laterality Date    C/SECTION, LOW TRANSVERSE  2016    breech / dr tala moreno / [facility]      SECTION      DILATION AND CURETTAGE      HYSTERECTOMY, TOTAL, ROBOT-ASSISTED, USING DA AMADO XI, WITH SALPINGECTOMY Bilateral 10/31/2022    Procedure: ROBOTIC TOTAL LAPAROSCOPIC HYSTERECTOMY, BILATERAL SALPINGECTOMY, CYSTOSCOPY;  Surgeon: Tala Moreno DO;  Location: [facility] Main OR    OTHER SURGICAL HISTORY      uterine septum revision     Family History       Problem (# of Occurrences) Relation (Name,Age of Onset)    Cancer (2) Maternal Grandfather: lung, Paternal Grandfather: lung    Urolithiasis (1) Mother           Social History     Socioeconomic History    Marital status: Single     Spouse name: Not on file    Number of children: Not on file    Years of education: Not on file    Highest education level: Not on file   Occupational History    Not on file   Tobacco Use    Smoking  status: Never    Smokeless tobacco: Not on file   Substance and Sexual Activity    Alcohol use: No    Drug use: No    Sexual activity: Yes     Partners: Male   Other Topics Concern    Not on file   Social History Narrative    Not on file     Social Determinants of Health     Financial Resource Strain: Not on file   Food Insecurity: Not on file   Transportation Needs: Not on file   Physical Activity: Not on file   Stress: Not on file   Social Connections: Not on file   Interpersonal Safety: Not on file   Housing Stability: Not on file       Current Outpatient Medications   Medication Sig Dispense Refill    dalfampridine (AMPYRA) 10 MG TB12 12 hr tablet Take 1 tablet (10 mg) by mouth 2 times daily 60 tablet 11    methylphenidate (RITALIN) 20 MG tablet Take 1 tablet (20 mg) by mouth 3 times daily 90 tablet 0    valACYclovir (VALTREX) 500 MG tablet Take 1 tablet (500 mg) by mouth daily 90 tablet 3    venlafaxine (EFFEXOR-ER) 225 MG 24 hr tablet Take 225 mg by mouth daily         /86   Pulse 80   Resp 16   SpO2 98%      On examination, patient is alert and cooperative.  Vitals are stable.  She is afebrile.  HEENT is negative.  Extraocular movements are intact.  Face is symmetric.  Tongue is midline neck is supple.  She is able to move her upper extremities functionally.  She has some difficulty with hand use.  Strength is full.  In the lower extremities she is able to lift her lower extremities left side better than the right side.  Right hip flexor strength is in the 4- out of 5.  She is able to extend the knees and flex again right side weaker than the left.  He has difficulty with ankle dorsiflexion on the right compared to the left.  There is increased tone in the flexor digitorum longus bilaterally gastrocnemius right worse than the left as well as some hamstring involvement right worse than the left.  I could not elicit a catch in her quadriceps with knee flexion.    Romberg is positive.  Gait is  characterized by spasticity and some scissoring.  There is toe flexion noted as above.  Finger stenosis of on the left upper extremity compared to the right.    Impression: At this point this 34-year-old woman with multiple sclerosis affecting sensation and spasticity as well as causing fatigue and bladder disturbance.  She has had occasional falls especially when she is carrying stuff over uneven surface.  We talked about proper lighting, and also potential for improving bladder function to decrease falls.  Detrol 2 mg in the morning might be helpful in reducing the urgency during the daytime.  I will also recommend going with neurotoxin and will order 300 units of Botox to treat the various muscles on either side of the lower extremities.  I reviewed the benefits and risks of the procedure including onset within 1 to 3 days, peak in 2 weeks and a duration of 12 weeks.  It may take 1-3 rounds to optimize the site as well as the dosage.  She is excited to go with these recommendations.  I will see her in follow-up in 2 weeks for Botox and again 4 weeks after that for a follow-up.    60 minutes visit, greater than 50% was for counseling on above-mentioned issues.      Solo Rothman MD

## 2024-08-08 ENCOUNTER — THERAPY VISIT (OUTPATIENT)
Dept: OCCUPATIONAL THERAPY | Facility: REHABILITATION | Age: 34
End: 2024-08-08
Attending: PSYCHIATRY & NEUROLOGY
Payer: COMMERCIAL

## 2024-08-08 DIAGNOSIS — Z78.9 IMPAIRED INSTRUMENTAL ACTIVITIES OF DAILY LIVING: Primary | ICD-10-CM

## 2024-08-08 DIAGNOSIS — M62.81 GENERALIZED MUSCLE WEAKNESS: ICD-10-CM

## 2024-08-08 DIAGNOSIS — R26.81 GAIT INSTABILITY: ICD-10-CM

## 2024-08-08 DIAGNOSIS — G35 MS (MULTIPLE SCLEROSIS) (H): ICD-10-CM

## 2024-08-08 DIAGNOSIS — R53.82 CHRONIC FATIGUE: ICD-10-CM

## 2024-08-08 PROCEDURE — 97165 OT EVAL LOW COMPLEX 30 MIN: CPT | Mod: GO | Performed by: OCCUPATIONAL THERAPIST

## 2024-08-08 NOTE — PROGRESS NOTES
"OCCUPATIONAL THERAPY EVALUATION  Type of Visit: Evaluation       Fall Risk Screen:  Fall screen completed by: OT  Have you fallen 2 or more times in the past year?: Yes  Have you fallen and had an injury in the past year?: Yes  Is patient a fall risk?: Yes  Fall screen comments: Patient states that she completed an episode of PT just a couple of months ago and is working on home exercises. she is aware she will be a fall risk given her diagnosis. will be addressing fall risk in OT as well. No need for TUG today as fall risk will be addressed on OT plan of care and is an identified need.    Subjective      Presenting condition or subjective complaint:   Per neurology note in EMR on 6-26-24, \"33 y/o woman with relapsing remitting MS. she suffered a clinical and radiologic decline while on Ocrevus.  She is now status post Mavenclad.  She tolerated treatment, but struggled with significant fatigue.     She would like to resume methylphenidate for fatigue.  This was ordered today.  She does continue to take dalfampridine to aid with walking.     Regardless, she is at very high risk for falls.  Recommended that she visit with rehab medicine.  I also counseled her on the importance of always using some sort of gait aid.  We also reviewed the importance of excepting help from others.\"    Patient states that she hopes to address fatigue, fall risk and memory changes while working with OT.    Date of onset: 06/26/24 (date of referral to OT)    Relevant medical history:     Past Medical History:   Diagnosis Date    Multiple sclerosis (H)      Dates & types of surgery:   See EMR    Prior diagnostic imaging/testing results: MRI     Prior therapy history for the same diagnosis, illness or injury:     PT in the past    Prior Level of Function-Patient's function has declined and tasks are more difficult    Living Environment  Social support: With family members , 3 children ages 3-13  Type of home: Apartment/condo   Stairs to enter " the home: Yes 7 Is there a railing: Yes     Ramp:   none  Stairs inside the home:        none  Help at home: None  Equipment owned: Straight Cane     Employment: Yes    Hobbies/Interests:      Patient goals for therapy:   decrease falling and fatigue    Pain assessment: Pain denied     Objective     Cognitive Status Examination  Orientation: Oriented to person, place and time   Level of Consciousness: Alert  Follows Commands and Answers Questions: 100% of the time  Personal Safety and Judgement: Intact  Memory: Impaired  Attention: No deficits identified  Organization/Problem Solving: No deficits identified  Executive Function: Working memory impaired, decreased storage of information for performing tasks    VISUAL SKILLS  Visual Acuity: Vision in right eye-white light, left eye is normal  Visual Field: Appears normal  Visual Attention: Appears normal  Oculomotor: NT    SENSATION: Numbness and tingling in B hands thru elbow and B feet/legs    RANGE OF MOTION: UE AROM WNL  STRENGTH:   Pain: - none + mild ++ moderate +++ severe  Strength Scale: 0-5/5 Left Right   Shoulder Flexion 4 3+   Shoulder Extension 4 3+   Elbow Flexion 4+ 4-   Elbow Extension 4+ 4-    Strength (lbs) 77# 26#   Lateral Pinch (lbs) 13# 4#   3 Point Pinch (lbs) 19# 7#        Hand Dominance: Right  MUSCLE TONE: Hypotonic  COORDINATION: Left Hand, Nine Hole Peg Test (sec): 25 seconds  Right Hand, Nine Hole Peg Test (sec): 26.41 seconds  BALANCE: Fair    FUNCTIONAL MOBILITY  Assistive Device(s): Cane (single end), Walker (four wheeled)  Ambulation: Independent  Wheelchair: None    BED MOBILITY: Independent    TRANSFERS: Independent    BATHING: Independent  Equipment: none    UPPER BODY DRESSING: Independent except fasteners  Equipment: none    LOWER BODY DRESSING: Independent but no longer wears socks as unable to levi/doff. Usually wears slip on shoes  Equipment: none    TOILETING: Independent  Equipment: none    GROOMING: Independent  Equipment:  none    EATING/SELF FEEDING: Independent   Equipment: none    ACTIVITY TOLERANCE: Fair to poor    INSTRUMENTAL ACTIVITIES OF DAILY LIVING (IADL):   Meal Planning/Prep: Patient is independent however fatigues easily  Home Management: Patient is independent however fatigues easily  Financial Management: Patient is independent however she forgets at times  Communication/Computer Use: Patient reports difficulty  Community Mobility: Patient is driving  Care of Others:- Patient is managing taking care of her children.    Will assess fatigue next visit      Assessment & Plan   CLINICAL IMPRESSIONS  Medical Diagnosis: MS (multiple sclerosis) (H) (G35)    Gait instability (R26.81)    Chronic fatigue (R53.82)    Treatment Diagnosis: Right UE weakness, fatigue, memory changes, impaired ADL and IADL function    Impression/Assessment: Pt is a 34 year old female presenting to Occupational Therapy due to symptoms resulting from MS.  The following significant findings have been identified: Impaired activity tolerance, Impaired balance, Impaired cognition, Impaired coordination, Impaired ROM, Impaired sensation, Impaired strength, and Pain.  These identified deficits interfere with their ability to perform self care tasks, work tasks, recreational activities, household chores, driving , community mobility, medication management, care of others, and meal planning and preparation as compared to previous level of function.     Clinical Decision Making (Complexity):  Assessment of Occupational Performance: 5 or more Performance Deficits  Occupational Performance Limitations: bathing/showering, dressing, care of others, child rearing, driving and community mobility, home establishment and management, meal preparation and cleanup, shopping, sleep, work, and leisure activities  Clinical Decision Making (Complexity): Low complexity    PLAN OF CARE  Treatment Interventions:  Interventions: Self-Care/Home Management, Therapeutic Activity,  Therapeutic Exercise    Long Term Goals   OT Goal 1  Goal Identifier: HEP  Goal Description: Patient to demonstrate independence with UE ROM HEP and good follow through at home for increased AROM to maximize the ability to maintain current level of function and prolong function  Target Date: 10/31/24  OT Goal 2  Goal Identifier: fatigue  Goal Description: Patient to state 3 strategies for energy conservation/work simplification and fatigue management for improved independence with ADL/IADLs at home as evidenced by 10+ reduction on MFIS  Target Date: 10/31/24  OT Goal 3  Goal Identifier: memory  Goal Description: Pt will verbalize understanding about strategies to help with improving attention (concentration) and internal and external strategies to help with memory and recall into daily routine for improved ADL/IADL performance  Target Date: 10/31/24  OT Goal 4  Goal Identifier: fall risk  Goal Description: (P) Patient will verbalize understanding of environmental modifications to decrease risk of falls at home  Target Date: 10/31/24      Frequency of Treatment: once a week  Duration of Treatment: 12 weeks     Recommended Referrals to Other Professionals: no  Education Assessment: Learner/Method: (P) Patient     Risks and benefits of evaluation/treatment have been explained.   Patient/Family/caregiver agrees with Plan of Care.     Evaluation Time:    OT Fariba Low Complexity Minutes (69924): (P) 40       Signing Clinician: Dodie Stewart OT        [facility]                                                                                   OUTPATIENT OCCUPATIONAL THERAPY      PLAN OF TREATMENT FOR OUTPATIENT REHABILITATION   Patient's Last Name, First Name, Mary Weston YOB: 1990   Provider's Name   [facility]   Medical Record No.  [id number]     Onset Date: 06/26/24 (date of referral to OT) Start of Care Date: 08/08/24      Medical Diagnosis:  MS (multiple sclerosis) (H) (G35)    Gait instability (R26.81)    Chronic fatigue (R53.82)      OT Treatment Diagnosis:  Right UE weakness, fatigue, memory changes, impaired ADL and IADL function Plan of Treatment  Frequency/Duration:once a week/12 weeks    Certification date from 08/08/24   To 10/31/24        See note for plan of treatment details and functional goals     Dodie Stewart, OT                         I CERTIFY THE NEED FOR THESE SERVICES FURNISHED UNDER        THIS PLAN OF TREATMENT AND WHILE UNDER MY CARE     (Physician attestation of this document indicates review and certification of the therapy plan).              Referring Provider:  Darby Sandoval MD    Initial Assessment  See Epic Evaluation- 08/08/24

## 2024-08-15 ENCOUNTER — OFFICE VISIT (OUTPATIENT)
Dept: PHYSICAL MEDICINE AND REHAB | Facility: CLINIC | Age: 34
End: 2024-08-15
Payer: COMMERCIAL

## 2024-08-15 VITALS
DIASTOLIC BLOOD PRESSURE: 75 MMHG | HEART RATE: 102 BPM | RESPIRATION RATE: 16 BRPM | SYSTOLIC BLOOD PRESSURE: 110 MMHG | OXYGEN SATURATION: 98 %

## 2024-08-15 DIAGNOSIS — R25.2 SPASTICITY: ICD-10-CM

## 2024-08-15 DIAGNOSIS — G35 MS (MULTIPLE SCLEROSIS) (H): Primary | ICD-10-CM

## 2024-08-15 DIAGNOSIS — R26.81 GAIT INSTABILITY: ICD-10-CM

## 2024-08-15 DIAGNOSIS — N31.9 NEUROGENIC BLADDER: ICD-10-CM

## 2024-08-15 PROCEDURE — 95874 GUIDE NERV DESTR NEEDLE EMG: CPT | Performed by: PHYSICAL MEDICINE & REHABILITATION

## 2024-08-15 PROCEDURE — 64643 CHEMODENERV 1 EXTREM 1-4 EA: CPT | Performed by: PHYSICAL MEDICINE & REHABILITATION

## 2024-08-15 PROCEDURE — 99213 OFFICE O/P EST LOW 20 MIN: CPT | Mod: 25 | Performed by: PHYSICAL MEDICINE & REHABILITATION

## 2024-08-15 PROCEDURE — 64642 CHEMODENERV 1 EXTREMITY 1-4: CPT | Performed by: PHYSICAL MEDICINE & REHABILITATION

## 2024-08-15 ASSESSMENT — PAIN SCALES - GENERAL: PAINLEVEL: NO PAIN (0)

## 2024-08-15 NOTE — PROGRESS NOTES
Here for evaluation of spasticity and treatment with Botox.     Patient is a very pleasant 34-year-old woman who reports having MS for the past 15 years.  She has a remitting, relapsing course and experiences significant fatigue.  She has experienced tightness especially in her lower extremities and clumsiness and difficulty using her hands for fine motor tasks.  She has a right ankle-foot orthosis that she sometimes finds it difficult to wear and is not wearing it today.  She has had falls one of them was when she was carrying a laundry basket up the stairs.  She also reports urgency and difficulty controlling her urination.  She denies any bowel issues.  She has been taking Ritalin to deal with her fatigue.  She would like to improve the tightness in her toes ankles as well as the knees.  She denies any particular area of pain or discomfort.     Past Medical History        Past Medical History:   Diagnosis Date    Multiple sclerosis (H)           Past Surgical History         Past Surgical History:   Procedure Laterality Date    C/SECTION, LOW TRANSVERSE   2016     breech / dr tala moreno / [facility]      SECTION        DILATION AND CURETTAGE        HYSTERECTOMY, TOTAL, ROBOT-ASSISTED, USING DA AMADO XI, WITH SALPINGECTOMY Bilateral 10/31/2022     Procedure: ROBOTIC TOTAL LAPAROSCOPIC HYSTERECTOMY, BILATERAL SALPINGECTOMY, CYSTOSCOPY;  Surgeon: Tala Moreno DO;  Location: [facility] Main OR    OTHER SURGICAL HISTORY         uterine septum revision         Family History            Problem (# of Occurrences) Relation (Name,Age of Onset)     Cancer (2) Maternal Grandfather: lung, Paternal Grandfather: lung     Urolithiasis (1) Mother                Social History   Social History            Socioeconomic History    Marital status: Single       Spouse name: Not on file    Number of children: Not on file    Years of education: Not on file    Highest education level: Not on file    Occupational History    Not on file   Tobacco Use    Smoking status: Never    Smokeless tobacco: Not on file   Substance and Sexual Activity    Alcohol use: No    Drug use: No    Sexual activity: Yes       Partners: Male   Other Topics Concern    Not on file   Social History Narrative    Not on file      Social Determinants of Health      Financial Resource Strain: Not on file   Food Insecurity: Not on file   Transportation Needs: Not on file   Physical Activity: Not on file   Stress: Not on file   Social Connections: Not on file   Interpersonal Safety: Not on file   Housing Stability: Not on file            Current Outpatient Medications   Medication Sig Dispense Refill    dalfampridine (AMPYRA) 10 MG TB12 12 hr tablet Take 1 tablet (10 mg) by mouth 2 times daily 60 tablet 11    methylphenidate (RITALIN) 20 MG tablet Take 1 tablet (20 mg) by mouth 3 times daily 90 tablet 0    tolterodine (DETROL) 2 MG tablet Take 1 tablet (2 mg) by mouth daily 30 tablet 1    valACYclovir (VALTREX) 500 MG tablet Take 1 tablet (500 mg) by mouth daily 90 tablet 3    venlafaxine (EFFEXOR-ER) 225 MG 24 hr tablet Take 225 mg by mouth daily                On examination, patient is alert and cooperative.  Vitals are stable.  She is afebrile.  HEENT is negative.  Extraocular movements are intact.  Face is symmetric.  Tongue is midline neck is supple.  She is able to move her upper extremities functionally.  She has some difficulty with hand use.  Strength is full.  In the lower extremities she is able to lift her lower extremities left side better than the right side.  Right hip flexor strength is in the 4- out of 5.  She is able to extend the knees and flex again right side weaker than the left.  She has difficulty with ankle dorsiflexion on the right compared to the left.  There is increased tone in the flexor digitorum longus bilaterally gastrocnemius right worse than the left as well as some hamstring involvement right worse than the  left.  I could not elicit a catch in her quadriceps with knee flexion.     Romberg is positive.  Gait is characterized by spasticity and some scissoring.  There is toe flexion noted as above.  Finger stenosis of on the left upper extremity compared to the right.     Impression: At this point this 34-year-old woman with multiple sclerosis affecting sensation and causing spasticity with fatigue and bladder disturbance.  She has had occasional falls especially when she is carrying stuff over uneven surface.  We talked about proper lighting, and also potential for improving bladder function to decrease falls.  She has started taking Detrol 2 mg in the morning and it is helping in reducing the urgency during the daytime.  For her spasticity, we will go with 300 units of Botox to treat the various muscles on either side of the lower extremities.  I reviewed the benefits and risks of the procedure including onset within 1 to 3 days, peak in 2 weeks and a duration of 12 weeks.  It may take 1-3 rounds to optimize the site as well as the dosage.        PROCEDURE NOTE: With her informed consent, after explaining the benefits and risks of the procedure, using preservative-free normal saline for dilution 1 cc per 100 units 300 units of Botox were utilized for injecting with EMG guidance as follows.  50 units were injected into the flexor digitorum longus on either side 50 units were divided amongst the medial and lateral heads of the gastrocnemius and 50 units were injected into the hamstrings on either side.  300 units were utilized in all.  She tolerated the procedure well.    She can ice the region, take Tylenol as needed for pain.  I will see her in follow-up in 2 weeks for Botox and again 4 weeks after that for a follow-up.     20 minutes visit, greater than 50% was for counseling on above-mentioned issues.        Solo Rothman MD

## 2024-08-15 NOTE — LETTER
8/15/2024       RE: Mary Evans  [address]  [place] [address]     Dear Colleague,    Thank you for referring your patient, Mary Evans, to the [facility] PHYSICAL MEDICINE AND REHABILITATION CLINIC [place] at [facility]. Please see a copy of my visit note below.    Here for evaluation of spasticity and treatment with Botox.     Patient is a very pleasant 34-year-old woman who reports having MS for the past 15 years.  She has a remitting, relapsing course and experiences significant fatigue.  She has experienced tightness especially in her lower extremities and clumsiness and difficulty using her hands for fine motor tasks.  She has a right ankle-foot orthosis that she sometimes finds it difficult to wear and is not wearing it today.  She has had falls one of them was when she was carrying a laundry basket up the stairs.  She also reports urgency and difficulty controlling her urination.  She denies any bowel issues.  She has been taking Ritalin to deal with her fatigue.  She would like to improve the tightness in her toes ankles as well as the knees.  She denies any particular area of pain or discomfort.     Past Medical History        Past Medical History:   Diagnosis Date     Multiple sclerosis (H)           Past Surgical History         Past Surgical History:   Procedure Laterality Date     C/SECTION, LOW TRANSVERSE   2016     breech / dr tala moreno / [facility]       SECTION         DILATION AND CURETTAGE         HYSTERECTOMY, TOTAL, ROBOT-ASSISTED, USING DA AMADO XI, WITH SALPINGECTOMY Bilateral 10/31/2022     Procedure: ROBOTIC TOTAL LAPAROSCOPIC HYSTERECTOMY, BILATERAL SALPINGECTOMY, CYSTOSCOPY;  Surgeon: Tala Moreno DO;  Location: [facility] Main OR     OTHER SURGICAL HISTORY         uterine septum revision         Family History            Problem (# of Occurrences) Relation (Name,Age of  Onset)     Cancer (2) Maternal Grandfather: lung, Paternal Grandfather: lung     Urolithiasis (1) Mother                Social History   Social History            Socioeconomic History     Marital status: Single       Spouse name: Not on file     Number of children: Not on file     Years of education: Not on file     Highest education level: Not on file   Occupational History     Not on file   Tobacco Use     Smoking status: Never     Smokeless tobacco: Not on file   Substance and Sexual Activity     Alcohol use: No     Drug use: No     Sexual activity: Yes       Partners: Male   Other Topics Concern     Not on file   Social History Narrative     Not on file      Social Determinants of Health      Financial Resource Strain: Not on file   Food Insecurity: Not on file   Transportation Needs: Not on file   Physical Activity: Not on file   Stress: Not on file   Social Connections: Not on file   Interpersonal Safety: Not on file   Housing Stability: Not on file            Current Outpatient Medications   Medication Sig Dispense Refill     dalfampridine (AMPYRA) 10 MG TB12 12 hr tablet Take 1 tablet (10 mg) by mouth 2 times daily 60 tablet 11     methylphenidate (RITALIN) 20 MG tablet Take 1 tablet (20 mg) by mouth 3 times daily 90 tablet 0     tolterodine (DETROL) 2 MG tablet Take 1 tablet (2 mg) by mouth daily 30 tablet 1     valACYclovir (VALTREX) 500 MG tablet Take 1 tablet (500 mg) by mouth daily 90 tablet 3     venlafaxine (EFFEXOR-ER) 225 MG 24 hr tablet Take 225 mg by mouth daily                On examination, patient is alert and cooperative.  Vitals are stable.  She is afebrile.  HEENT is negative.  Extraocular movements are intact.  Face is symmetric.  Tongue is midline neck is supple.  She is able to move her upper extremities functionally.  She has some difficulty with hand use.  Strength is full.  In the lower extremities she is able to lift her lower extremities left side better than the right side.  Right  hip flexor strength is in the 4- out of 5.  She is able to extend the knees and flex again right side weaker than the left.  She has difficulty with ankle dorsiflexion on the right compared to the left.  There is increased tone in the flexor digitorum longus bilaterally gastrocnemius right worse than the left as well as some hamstring involvement right worse than the left.  I could not elicit a catch in her quadriceps with knee flexion.     Romberg is positive.  Gait is characterized by spasticity and some scissoring.  There is toe flexion noted as above.  Finger stenosis of on the left upper extremity compared to the right.     Impression: At this point this 34-year-old woman with multiple sclerosis affecting sensation and causing spasticity with fatigue and bladder disturbance.  She has had occasional falls especially when she is carrying stuff over uneven surface.  We talked about proper lighting, and also potential for improving bladder function to decrease falls.  She has started taking Detrol 2 mg in the morning and it is helping in reducing the urgency during the daytime.  For her spasticity, we will go with 300 units of Botox to treat the various muscles on either side of the lower extremities.  I reviewed the benefits and risks of the procedure including onset within 1 to 3 days, peak in 2 weeks and a duration of 12 weeks.  It may take 1-3 rounds to optimize the site as well as the dosage.        PROCEDURE NOTE: With her informed consent, after explaining the benefits and risks of the procedure, using preservative-free normal saline for dilution 1 cc per 100 units 300 units of Botox were utilized for injecting with EMG guidance as follows.  50 units were injected into the flexor digitorum longus on either side 50 units were divided amongst the medial and lateral heads of the gastrocnemius and 50 units were injected into the hamstrings on either side.  300 units were utilized in all.  She tolerated the  procedure well.    She can ice the region, take Tylenol as needed for pain.  I will see her in follow-up in 2 weeks for Botox and again 4 weeks after that for a follow-up.     20 minutes visit, greater than 50% was for counseling on above-mentioned issues.        Solo Rothman MD       Again, thank you for allowing me to participate in the care of your patient.      Sincerely,    Solo Rothman MD

## 2024-08-20 ENCOUNTER — THERAPY VISIT (OUTPATIENT)
Dept: OCCUPATIONAL THERAPY | Facility: REHABILITATION | Age: 34
End: 2024-08-20
Payer: COMMERCIAL

## 2024-08-20 DIAGNOSIS — M62.81 GENERALIZED MUSCLE WEAKNESS: Primary | ICD-10-CM

## 2024-08-20 DIAGNOSIS — R53.82 CHRONIC FATIGUE: ICD-10-CM

## 2024-08-20 DIAGNOSIS — Z78.9 IMPAIRED INSTRUMENTAL ACTIVITIES OF DAILY LIVING: ICD-10-CM

## 2024-08-20 DIAGNOSIS — G35 MS (MULTIPLE SCLEROSIS) (H): ICD-10-CM

## 2024-08-20 PROCEDURE — 97535 SELF CARE MNGMENT TRAINING: CPT | Mod: GO | Performed by: OCCUPATIONAL THERAPIST

## 2024-09-09 ENCOUNTER — THERAPY VISIT (OUTPATIENT)
Dept: PHYSICAL THERAPY | Facility: CLINIC | Age: 34
End: 2024-09-09
Attending: PSYCHIATRY & NEUROLOGY
Payer: COMMERCIAL

## 2024-09-09 DIAGNOSIS — G35 MS (MULTIPLE SCLEROSIS) (H): Primary | ICD-10-CM

## 2024-09-09 PROCEDURE — 97112 NEUROMUSCULAR REEDUCATION: CPT | Mod: GP

## 2024-09-09 PROCEDURE — 97110 THERAPEUTIC EXERCISES: CPT | Mod: GP

## 2024-09-09 PROCEDURE — 97530 THERAPEUTIC ACTIVITIES: CPT | Mod: GP

## 2024-09-09 NOTE — PROGRESS NOTES
24 0500   Appointment Info   Signing clinician's name / credentials Nestor Bailey DPT   Total/Authorized Visits 15   Visits Used [facility]   Medical Diagnosis MS (multiple sclerosis) (H) (G35)   PT Tx Diagnosis Fractionated movement deficit   Progress Note/Certification   Start of Care Date 23   Onset of illness/injury or Date of Surgery 08   Therapy Frequency 1x/week   Predicted Duration 90 days   Certification date from 24   Certification date to 24   PT Goal 1   Goal Identifier HEP   Goal Description Pt will perform HEP with IND progressions and regressions to help manage symptoms long term.   Rationale to maximize safety and independence within the community   Goal Progress Ongoin24 - pt reports not able to perform ex in months, has stretched intermittently, not enough time d/t other responsibilities.10/30 - pt reports continuing to perform HEP at home. HEP issued   Target Date 24   PT Goal 2   Goal Identifier Static balance   Goal Description Pt will perform eyes closed Romberg stand with min sway for at least 30 sec and eyes closed foam pad stand for 30 sec (no sway limitations) to demonstrate improved balance without visual input.   Rationale to maximize safety and independence within the home   Goal Progress Ongoin24 - not tested d/t fatigue. 10/30 - #2 for 30 sec and #5 for 30 sec, noting min-moderate sway during assessment. Eval: mCTSIB #2 (30 sec max sway), #4 (22 sec max sway, writer support to prevent LOB).   Target Date 24   PT Goal 3   Goal Identifier Falls   Goal Description Pt will report fewer than 1 fall per month to demonstate improved safety at home and in the community.   Rationale to maximize safety and independence within the home;to maximize safety and independence within the community   Goal Progress Ongoing: 3/7/24 - pt reports daily falls at home. 24 - pt reports having multiple falls/week, seems to be getting worse. 10/30  - pt reports 4-5 falls/week. Eval: pt reports about 1 fall per week   Target Date 06/11/24   PT Goal 4   Goal Identifier 6MWT   Goal Description Pt will show increase of at least 258 ft (1,548 total) to demonstrate improved activity tolerance.   Rationale to maximize safety and independence within the community   Goal Progress Ongoing: 3/7/24 - 840 ft, 4WW, 2 children present affecting performance. 1/25/24 - unable to assess d/t fatigue. 10/30 - 1228 ft, no AD, no rest breaks. Eval: 1,290 ft   Target Date 06/11/24   Subjective Report   Subjective Report Pt's dtr, Sary, present for session. Pt reports doing better, has been taken off a few medications, has been better able to participate in exercises at home with her Peloton and has a new recumbent bike.   Therapeutic Procedure/Exercise   Therapeutic Procedures: strength, endurance, ROM, flexibility minutes (13476) 25   Ther Proc 2 Endurance training   Ther Proc 2 - Details Recumbent bike, L4 x 5 min, L5 x 20 min, maintaining steady pace (unable to pull up stats from session, pt accidentally reset after stopping)   Therapeutic Activity   Therapeutic Activities: dynamic activities to improve functional performance minutes (62886) 15   Ther Act 1 Care planning and education   Ther Act 1 - Details Discussed plan for remaining sessions, plan to finish sessions through March to setup home program. Will plan to finalize DME needs (currently has 4WW, B AFOs, pursuing scooter through [employer]). Recommended pt purchase ankle braces (one a pull over sleeve by [employer], another a velcro strap ankle brace), pt reports purchasing   Plan   Home program PTRx, home cycling (Peloton, recumbent bike at home)   Plan for next session Recumbent bike for endurance, stretching/strengthening for RLE prn, education on exercise progression (reps/sets, endurance progressions).   Comments   Comments Pt reports doing better, has been taken off a few medications, has been better able to  participate in exercises at home with her Peloton and has a new recumbent bike. Plan to take a break from therapy to allow pt time to focus on treating other aspects of health with the intention of returning to PT again under this episode of care when able.   Total Session Time   Timed Code Treatment Minutes 40   Total Treatment Time (sum of timed and untimed services) 40         [facility]                                                                                   OUTPATIENT PHYSICAL THERAPY    PLAN OF TREATMENT FOR OUTPATIENT REHABILITATION   Patient's Last Name, First Name, MVALERIE EvansPorfirion  MARY BETH YOB: 1990   Provider's Name   [facility]   Medical Record No.  [id number]     Onset Date: 06/02/08  Start of Care Date: 08/18/23     Medical Diagnosis:  MS (multiple sclerosis) (H) (G35)      PT Treatment Diagnosis:  Fractionated movement deficit Plan of Treatment  Frequency/Duration: 1x/week/ 90 days    Certification date from 03/13/24 to 06/11/24         See note for plan of treatment details and functional goals     [place] Bailey, PT                         I CERTIFY THE NEED FOR THESE SERVICES FURNISHED UNDER        THIS PLAN OF TREATMENT AND WHILE UNDER MY CARE     (Physician attestation of this document indicates review and certification of the therapy plan).              Referring Provider:  Darby Sandoval    Initial Assessment  See Epic Evaluation- Start of Care Date: 08/18/23

## 2024-09-09 NOTE — PROGRESS NOTES
24 0500   Appointment Info   Signing clinician's name / credentials Nestor Bailey DPT   Total/Authorized Visits 6/10 until next progress note due   Visits Used 16 total visits - [facility]   Medical Diagnosis MS (multiple sclerosis) (H) (G35)   PT Tx Diagnosis Fractionated movement deficit   Progress Note/Certification   Start of Care Date 23   Onset of illness/injury or Date of Surgery 08   Therapy Frequency 1x/week   Predicted Duration 90 days   Certification date from 24   Certification date to 24   PT Goal 1   Goal Identifier HEP   Goal Description Pt will perform HEP with IND progressions and regressions to help manage symptoms long term.   Rationale to maximize safety and independence within the community   Goal Progress Ongoin24 - pt reports not able to perform ex in months, has stretched intermittently, not enough time d/t other responsibilities.10/30 - pt reports continuing to perform HEP at home. HEP issued   Target Date 24   PT Goal 2   Goal Identifier Static balance   Goal Description Pt will perform eyes closed Romberg stand with min sway for at least 30 sec and eyes closed foam pad stand for 30 sec (no sway limitations) to demonstrate improved balance without visual input.   Rationale to maximize safety and independence within the home   Goal Progress Ongoin24 - not tested d/t fatigue. 10/30 - #2 for 30 sec and #5 for 30 sec, noting min-moderate sway during assessment. Eval: mCTSIB #2 (30 sec max sway), #4 (22 sec max sway, writer support to prevent LOB).   Target Date 24   PT Goal 3   Goal Identifier Falls   Goal Description Pt will report fewer than 1 fall per month to demonstate improved safety at home and in the community.   Rationale to maximize safety and independence within the home;to maximize safety and independence within the community   Goal Progress Ongoing: 3/7/24 - pt reports daily falls at home. 24 - pt reports having  "multiple falls/week, seems to be getting worse. 10/30 - pt reports 4-5 falls/week. Eval: pt reports about 1 fall per week   Target Date 12/05/24   PT Goal 4   Goal Identifier 6MWT   Goal Description Pt will show increase of at least 258 ft (1,548 total) to demonstrate improved activity tolerance.   Rationale to maximize safety and independence within the community   Goal Progress Ongoing: 3/7/24 - 840 ft, 4WW, 2 children present affecting performance. 1/25/24 - unable to assess d/t fatigue. 10/30 - 1228 ft, no AD, no rest breaks. Eval: 1,290 ft   Target Date 12/05/24   Subjective Report   Subjective Report Pt reports having ongoing issues with RLE > LLE. Has started using ankle brace (wrap) for L ankle for more medial lateral stability. Youngest dtr started 3 days/week , will have more time to focus on herself while all children are at school/. Has not been performing exercises, reports \"I went through a period where I just didn't want to do them.\"   Therapeutic Procedure/Exercise   Therapeutic Procedures: strength, endurance, ROM, flexibility minutes (78952) 20   Ther Proc 2 Endurance training   Ther Proc 2 - Details Recumbent bike, L4 x 20 min, maintaining steady pace during exercise.   Therapeutic Activity   Therapeutic Activities: dynamic activities to improve functional performance minutes (03550) 10   Ther Act 1 Education   Ther Act 1 - Details Ed pt on rationale for standing balance with EC to focus more on vestibular input and somatosensory use for maintaining balance.   Ther Act 2 DME   Ther Act 2 - Details Discussed using ankle Aircast to increase med/lat stability for L ankle.   Neuromuscular Re-education   Neuromuscular re-ed of mvmt, balance, coord, kinesthetic sense, posture, proprioception minutes (03331) 10   Neuro Re-ed 1 Standing static balance   Neuro Re-ed 1 - Details Standing, // bars, EC, pt leaning post, able to stand 30 sec with assist 3x to correct post LOB. Changed to " standing in front of wall with chair in front 30 sec EC with better ability to maintain balance and correct post lean, no LOB. Added to HEP   Patient Response/Progress Pt reports increased fatigue post ex, stopped after 2 reps  to conserve energy for the rest of the day.   Plan   Home program PTRx, home cycling with recumbent bike   Updates to plan of care Pt reports using recumbent bike, no longer using Peloton. Adjusted HEP to focus more on standing balance with EC to improve vestibular input to balance.   Plan for next session How has HEP been going? Standing static balance with goal of 10-12 min of cumulative standing balance EC, then sup stretching   Comments   Comments Returning to PT for this episode of care. Will plan to focus more on balance in standing with EC to improve vestibular input to balance/sensory selection as pt reporting more feelings of post LOB, recent fall post while taking pictures with her phone. Will slowly incorporate strengthening exercise into balance exercise as able.   Total Session Time   Timed Code Treatment Minutes 40   Total Treatment Time (sum of timed and untimed services) 40         PLAN  Adjusted plan to focus HEP more on standing static balance to correct post lean and improve sensory selection for balance while at home d/t recent fall(s).     Beginning/End Dates of Progress Note Reporting Period:  01/04/2024 to 09/09/2024    Referring Provider:  Darby Sandoval

## 2024-09-09 NOTE — PROGRESS NOTES
24 6427   Appointment Info   Signing clinician's name / credentials Nestor Bailey DPT   Total/Authorized Visits 15 (recert)   Visits Used [facility]   Medical Diagnosis MS (multiple sclerosis) (H) (G35)   PT Tx Diagnosis Fractionated movement deficit   Progress Note/Certification   Start of Care Date 23   Onset of illness/injury or Date of Surgery 08   Therapy Frequency 1x/week   Predicted Duration 90 days   Certification date from 24   Certification date to 24   PT Goal 1   Goal Identifier HEP   Goal Description Pt will perform HEP with IND progressions and regressions to help manage symptoms long term.   Rationale to maximize safety and independence within the community   Goal Progress Ongoin24 - pt reports not able to perform ex in months, has stretched intermittently, not enough time d/t other responsibilities.10/30 - pt reports continuing to perform HEP at home. HEP issued   Target Date 24   PT Goal 2   Goal Identifier Static balance   Goal Description Pt will perform eyes closed Romberg stand with min sway for at least 30 sec and eyes closed foam pad stand for 30 sec (no sway limitations) to demonstrate improved balance without visual input.   Rationale to maximize safety and independence within the home   Goal Progress Ongoin24 - not tested d/t fatigue. 10/30 - #2 for 30 sec and #5 for 30 sec, noting min-moderate sway during assessment. Eval: mCTSIB #2 (30 sec max sway), #4 (22 sec max sway, writer support to prevent LOB).   Target Date 24   PT Goal 3   Goal Identifier Falls   Goal Description Pt will report fewer than 1 fall per month to demonstate improved safety at home and in the community.   Rationale to maximize safety and independence within the home;to maximize safety and independence within the community   Goal Progress Ongoing: 3/7/24 - pt reports daily falls at home. 24 - pt reports having multiple falls/week, seems to be getting  worse. 10/30 - pt reports 4-5 falls/week. Eval: pt reports about 1 fall per week   Target Date 09/05/24   PT Goal 4   Goal Identifier 6MWT   Goal Description Pt will show increase of at least 258 ft (1,548 total) to demonstrate improved activity tolerance.   Rationale to maximize safety and independence within the community   Goal Progress Ongoing: 3/7/24 - 840 ft, 4WW, 2 children present affecting performance. 1/25/24 - unable to assess d/t fatigue. 10/30 - 1228 ft, no AD, no rest breaks. Eval: 1,290 ft   Target Date 09/05/24   Plan   Home program PTRx, home cycling (Peloton, recumbent bike at home)   Plan for next session Recumbent bike for endurance, stretching/strengthening for RLE prn, education on exercise progression (reps/sets, endurance progressions).   Comments   Comments Plan to take a break from therapy to allow pt time to focus on treating other aspects of health with the intention of returning to PT again under this episode of care when able.         [facility]                                                                                   OUTPATIENT PHYSICAL THERAPY    PLAN OF TREATMENT FOR OUTPATIENT REHABILITATION   Patient's Last Name, First Name, Mary Weston YOB: 1990   Provider's Name   [facility]   Medical Record No.  [id number]     Onset Date: 06/02/08  Start of Care Date: 08/18/23     Medical Diagnosis:  MS (multiple sclerosis) (H) (G35)      PT Treatment Diagnosis:  Fractionated movement deficit Plan of Treatment  Frequency/Duration: 1x/week/ 90 days    Certification date from 6/11/24 to 9/5/24        See note for plan of treatment details and functional goals     [place]ana Bailey, PT                         I CERTIFY THE NEED FOR THESE SERVICES FURNISHED UNDER        THIS PLAN OF TREATMENT AND WHILE UNDER MY CARE     (Physician attestation of this document indicates review and certification of the  therapy plan).              Referring Provider:  Darby Sandoval    Initial Assessment  See Epic Evaluation- Start of Care Date: 08/18/23

## 2024-09-09 NOTE — PROGRESS NOTES
24 [id number]   Appointment Info   Signing clinician's name / credentials Nestor Bailey DPT   Total/Authorized Visits 15 (recert)   Visits Used [facility]   Medical Diagnosis MS (multiple sclerosis) (H) (G35)   PT Tx Diagnosis Fractionated movement deficit   Progress Note/Certification   Start of Care Date 23   Onset of illness/injury or Date of Surgery 08   Therapy Frequency 1x/week   Predicted Duration 90 days   Certification date from 24   Certification date to 24   PT Goal 1   Goal Identifier HEP   Goal Description Pt will perform HEP with IND progressions and regressions to help manage symptoms long term.   Rationale to maximize safety and independence within the community   Goal Progress Ongoin24 - pt reports not able to perform ex in months, has stretched intermittently, not enough time d/t other responsibilities.10/30 - pt reports continuing to perform HEP at home. HEP issued   Target Date 24   PT Goal 2   Goal Identifier Static balance   Goal Description Pt will perform eyes closed Romberg stand with min sway for at least 30 sec and eyes closed foam pad stand for 30 sec (no sway limitations) to demonstrate improved balance without visual input.   Rationale to maximize safety and independence within the home   Goal Progress Ongoin24 - not tested d/t fatigue. 10/30 - #2 for 30 sec and #5 for 30 sec, noting min-moderate sway during assessment. Eval: mCTSIB #2 (30 sec max sway), #4 (22 sec max sway, writer support to prevent LOB).   Target Date 24   PT Goal 3   Goal Identifier Falls   Goal Description Pt will report fewer than 1 fall per month to demonstate improved safety at home and in the community.   Rationale to maximize safety and independence within the home;to maximize safety and independence within the community   Goal Progress Ongoing: 3/7/24 - pt reports daily falls at home. 24 - pt reports having multiple falls/week, seems to be getting  worse. 10/30 - pt reports 4-5 falls/week. Eval: pt reports about 1 fall per week   Target Date 09/05/24   PT Goal 4   Goal Identifier 6MWT   Goal Description Pt will show increase of at least 258 ft (1,548 total) to demonstrate improved activity tolerance.   Rationale to maximize safety and independence within the community   Goal Progress Ongoing: 3/7/24 - 840 ft, 4WW, 2 children present affecting performance. 1/25/24 - unable to assess d/t fatigue. 10/30 - 1228 ft, no AD, no rest breaks. Eval: 1,290 ft   Target Date 09/05/24   Plan   Home program PTRx, home cycling (Peloton, recumbent bike at home)   Plan for next session Recumbent bike for endurance, stretching/strengthening for RLE prn, education on exercise progression (reps/sets, endurance progressions).   Comments   Comments Plan to take a break from therapy to allow pt time to focus on treating other aspects of health with the intention of returning to PT again under this episode of care when able.         [facility]                                                                                   OUTPATIENT PHYSICAL THERAPY    PLAN OF TREATMENT FOR OUTPATIENT REHABILITATION   Patient's Last Name, First Name, Mary Weston YOB: 1990   Provider's Name   [facility]   Medical Record No.  [id number]     Onset Date: 06/02/08  Start of Care Date: 08/18/23     Medical Diagnosis:  MS (multiple sclerosis) (H) (G35)      PT Treatment Diagnosis:  Fractionated movement deficit Plan of Treatment  Frequency/Duration: 1x/week/ 90 days    Certification date from 6/11/24 to 9/5/24        See note for plan of treatment details and functional goals     Montana Bailey, PT                         I CERTIFY THE NEED FOR THESE SERVICES FURNISHED UNDER        THIS PLAN OF TREATMENT AND WHILE UNDER MY CARE     (Physician attestation of this document indicates review and certification of the  therapy plan).              Referring Provider:  Darby Sandoval    Initial Assessment  See Epic Evaluation- Start of Care Date: 08/18/23

## 2024-09-09 NOTE — PROGRESS NOTES
24 [id number]   Appointment Info   Signing clinician's name / credentials Nestor Bailey DPT   Total/Authorized Visits 15 (recert)   Visits Used [facility]   Medical Diagnosis MS (multiple sclerosis) (H) (G35)   PT Tx Diagnosis Fractionated movement deficit   Progress Note/Certification   Start of Care Date 23   Onset of illness/injury or Date of Surgery 08   Therapy Frequency 1x/week   Predicted Duration 90 days   Certification date from 24   Certification date to 24   PT Goal 1   Goal Identifier HEP   Goal Description Pt will perform HEP with IND progressions and regressions to help manage symptoms long term.   Rationale to maximize safety and independence within the community   Goal Progress Ongoin24 - pt reports not able to perform ex in months, has stretched intermittently, not enough time d/t other responsibilities.10/30 - pt reports continuing to perform HEP at home. HEP issued   Target Date 24   PT Goal 2   Goal Identifier Static balance   Goal Description Pt will perform eyes closed Romberg stand with min sway for at least 30 sec and eyes closed foam pad stand for 30 sec (no sway limitations) to demonstrate improved balance without visual input.   Rationale to maximize safety and independence within the home   Goal Progress Ongoin24 - not tested d/t fatigue. 10/30 - #2 for 30 sec and #5 for 30 sec, noting min-moderate sway during assessment. Eval: mCTSIB #2 (30 sec max sway), #4 (22 sec max sway, writer support to prevent LOB).   Target Date 24   PT Goal 3   Goal Identifier Falls   Goal Description Pt will report fewer than 1 fall per month to demonstate improved safety at home and in the community.   Rationale to maximize safety and independence within the home;to maximize safety and independence within the community   Goal Progress Ongoing: 3/7/24 - pt reports daily falls at home. 24 - pt reports having multiple falls/week, seems to be getting  worse. 10/30 - pt reports 4-5 falls/week. Eval: pt reports about 1 fall per week   Target Date 12/05/24   PT Goal 4   Goal Identifier 6MWT   Goal Description Pt will show increase of at least 258 ft (1,548 total) to demonstrate improved activity tolerance.   Rationale to maximize safety and independence within the community   Goal Progress Ongoing: 3/7/24 - 840 ft, 4WW, 2 children present affecting performance. 1/25/24 - unable to assess d/t fatigue. 10/30 - 1228 ft, no AD, no rest breaks. Eval: 1,290 ft   Target Date 12/05/24   Plan   Home program PTRx, home cycling (Peloton, recumbent bike at home)   Plan for next session Recumbent bike for endurance, stretching/strengthening for RLE prn, education on exercise progression (reps/sets, endurance progressions).   Comments   Comments Plan to take a break from therapy to allow pt time to focus on treating other aspects of health with the intention of returning to PT again under this episode of care when able.         [facility]                                                                                   OUTPATIENT PHYSICAL THERAPY    PLAN OF TREATMENT FOR OUTPATIENT REHABILITATION   Patient's Last Name, First Name, Mary Weston YOB: 1990   Provider's Name   [facility]   Medical Record No.  [id number]     Onset Date: 06/02/08  Start of Care Date: 08/18/23     Medical Diagnosis:  MS (multiple sclerosis) (H) (G35)      PT Treatment Diagnosis:  Fractionated movement deficit Plan of Treatment  Frequency/Duration: 1x/week/ 90 days    Certification date from 09/05/24 to 12/05/24         See note for plan of treatment details and functional goals     Montana Bailey, PT                         I CERTIFY THE NEED FOR THESE SERVICES FURNISHED UNDER        THIS PLAN OF TREATMENT AND WHILE UNDER MY CARE     (Physician attestation of this document indicates review and certification of the  therapy plan).              Referring Provider:  Darby Sandoval    Initial Assessment  See Epic Evaluation- Start of Care Date: 08/18/23

## 2024-09-12 ENCOUNTER — OFFICE VISIT (OUTPATIENT)
Dept: PHYSICAL MEDICINE AND REHAB | Facility: CLINIC | Age: 34
End: 2024-09-12
Payer: COMMERCIAL

## 2024-09-12 VITALS
HEART RATE: 110 BPM | RESPIRATION RATE: 16 BRPM | DIASTOLIC BLOOD PRESSURE: 77 MMHG | SYSTOLIC BLOOD PRESSURE: 101 MMHG | OXYGEN SATURATION: 98 %

## 2024-09-12 DIAGNOSIS — G35 MS (MULTIPLE SCLEROSIS) (H): Primary | ICD-10-CM

## 2024-09-12 DIAGNOSIS — R26.81 GAIT INSTABILITY: ICD-10-CM

## 2024-09-12 DIAGNOSIS — R25.2 SPASTICITY: ICD-10-CM

## 2024-09-12 DIAGNOSIS — N31.9 NEUROGENIC BLADDER: ICD-10-CM

## 2024-09-12 PROCEDURE — 99214 OFFICE O/P EST MOD 30 MIN: CPT | Performed by: PHYSICAL MEDICINE & REHABILITATION

## 2024-09-12 NOTE — NURSING NOTE
Chief Complaint   Patient presents with    RECHECK     Here for a follow up after Botox injections, confirmed with patient     Jamaica Badillo

## 2024-09-12 NOTE — LETTER
9/12/2024       RE: Mary Evans  [address]  [place] [address]     Dear Colleague,    Thank you for referring your patient, Mary Evans, to the [facility] PHYSICAL MEDICINE AND REHABILITATION CLINIC [place] at [facility]. Please see a copy of my visit note below.    Here for evaluation of spasticity following recent treatment with Botox.     She was last seen by me on 8/15/2024.  At that time she was noted to have involvement of multiple muscles in the lower extremities resulting in gait disturbance.  She received Botox injections and feels it has been very helpful.  She has been using a single ended cane as needed.  She continues to have an ankle-foot orthosis on the right side.  She has been receiving physical therapy.  She has also found Detrol to be very helpful in reducing her bladder urgency and frequency.    Patient is a very pleasant 34-year-old woman who reports having MS for the past 15 years.  She has a remitting, relapsing course and experiences significant fatigue.  She has experienced tightness especially in her lower extremities and clumsiness and difficulty using her hands for fine motor tasks.  She has a right ankle-foot orthosis that she sometimes finds it difficult to wear and is not wearing it today.  She has had falls one of them was when she was carrying a laundry basket up the stairs.  She also reports urgency and difficulty controlling her urination.  She denies any bowel issues.  She has been taking Ritalin to deal with her fatigue.  She would like to improve the tightness in her toes ankles as well as the knees.  She denies any particular area of pain or discomfort.     Past Medical History           Past Medical History:   Diagnosis Date     Multiple sclerosis (H)           Past Surgical History             Past Surgical History:   Procedure Laterality Date     C/SECTION, LOW TRANSVERSE   06/28/2016      breech / dr tala moreno / [facility]       SECTION         DILATION AND CURETTAGE         HYSTERECTOMY, TOTAL, ROBOT-ASSISTED, USING DA AMADO XI, WITH SALPINGECTOMY Bilateral 10/31/2022     Procedure: ROBOTIC TOTAL LAPAROSCOPIC HYSTERECTOMY, BILATERAL SALPINGECTOMY, CYSTOSCOPY;  Surgeon: Tala Moreno DO;  Location: [facility] Main OR     OTHER SURGICAL HISTORY         uterine septum revision         Family History            Problem (# of Occurrences) Relation (Name,Age of Onset)     Cancer (2) Maternal Grandfather: lung, Paternal Grandfather: lung     Urolithiasis (1) Mother                Social History   Social History                Socioeconomic History     Marital status: Single       Spouse name: Not on file     Number of children: Not on file     Years of education: Not on file     Highest education level: Not on file   Occupational History     Not on file   Tobacco Use     Smoking status: Never     Smokeless tobacco: Not on file   Substance and Sexual Activity     Alcohol use: No     Drug use: No     Sexual activity: Yes       Partners: Male   Other Topics Concern     Not on file   Social History Narrative     Not on file      Social Determinants of Health      Financial Resource Strain: Not on file   Food Insecurity: Not on file   Transportation Needs: Not on file   Physical Activity: Not on file   Stress: Not on file   Social Connections: Not on file   Interpersonal Safety: Not on file   Housing Stability: Not on file            Current Outpatient Medications   Medication Sig Dispense Refill     dalfampridine (AMPYRA) 10 MG TB12 12 hr tablet Take 1 tablet (10 mg) by mouth 2 times daily 60 tablet 11     methylphenidate (RITALIN) 20 MG tablet Take 1 tablet (20 mg) by mouth 3 times daily 90 tablet 0     tolterodine (DETROL) 2 MG tablet Take 1 tablet (2 mg) by mouth daily 30 tablet 1     valACYclovir (VALTREX) 500 MG tablet Take 1 tablet (500 mg) by mouth daily 90 tablet 3      venlafaxine (EFFEXOR-ER) 225 MG 24 hr tablet Take 225 mg by mouth daily        On examination, patient is alert and cooperative.  Vitals are stable.  She is afebrile.  HEENT is negative.  Extraocular movements are intact.  Face is symmetric.  Tongue is midline neck is supple.  She is able to move her upper extremities functionally.  She has some difficulty with hand use.  Strength is full.  In the lower extremities she is able to lift her lower extremities left side better than the right side.  Right hip flexor strength is in the 4- out of 5.  She is able to extend the knees and flex again right side weaker than the left.  She has difficulty with ankle dorsiflexion on the right compared to the left.  There is improved tone in the flexor digitorum longus bilaterally, gastrocnemius, and hamstrings on either side.  I could not elicit a catch in her quadriceps with knee flexion.     She is able to get up and walk around without significant difficulty.     Impression: At this point this 34-year-old woman with multiple sclerosis affecting sensation and causing spasticity with fatigue and bladder disturbance.  She has had occasional falls especially when she is carrying stuff over uneven surface.  She will continue to watch for proper lighting, and has already noted improvement in her bladder dysfunction.  She will continue her Detrol for this.     She has done well with the recent Botox and we will repeat it when she returns for follow-up 12 weeks from 8/15/2024.    Was reviewed at length with the patient.  All questions were answered to her satisfaction.    30 minutes visit, greater than 50% was for counseling on above-mentioned issues.        Solo Rothman MD       Again, thank you for allowing me to participate in the care of your patient.      Sincerely,    Solo Rothman MD

## 2024-09-12 NOTE — PROGRESS NOTES
Here for evaluation of spasticity following recent treatment with Botox.     She was last seen by me on 8/15/2024.  At that time she was noted to have involvement of multiple muscles in the lower extremities resulting in gait disturbance.  She received Botox injections and feels it has been very helpful.  She has been using a single ended cane as needed.  She continues to have an ankle-foot orthosis on the right side.  She has been receiving physical therapy.  She has also found Detrol to be very helpful in reducing her bladder urgency and frequency.    Patient is a very pleasant 34-year-old woman who reports having MS for the past 15 years.  She has a remitting, relapsing course and experiences significant fatigue.  She has experienced tightness especially in her lower extremities and clumsiness and difficulty using her hands for fine motor tasks.  She has a right ankle-foot orthosis that she sometimes finds it difficult to wear and is not wearing it today.  She has had falls one of them was when she was carrying a laundry basket up the stairs.  She also reports urgency and difficulty controlling her urination.  She denies any bowel issues.  She has been taking Ritalin to deal with her fatigue.  She would like to improve the tightness in her toes ankles as well as the knees.  She denies any particular area of pain or discomfort.     Past Medical History           Past Medical History:   Diagnosis Date    Multiple sclerosis (H)           Past Surgical History             Past Surgical History:   Procedure Laterality Date    C/SECTION, LOW TRANSVERSE   2016     breech / dr tala moreno / [facility]      SECTION        DILATION AND CURETTAGE        HYSTERECTOMY, TOTAL, ROBOT-ASSISTED, USING DA AMADO XI, WITH SALPINGECTOMY Bilateral 10/31/2022     Procedure: ROBOTIC TOTAL LAPAROSCOPIC HYSTERECTOMY, BILATERAL SALPINGECTOMY, CYSTOSCOPY;  Surgeon: Tala Moreno DO;  Location: [facility]     OTHER SURGICAL HISTORY         uterine septum revision         Family History            Problem (# of Occurrences) Relation (Name,Age of Onset)     Cancer (2) Maternal Grandfather: lung, Paternal Grandfather: lung     Urolithiasis (1) Mother                Social History   Social History                Socioeconomic History    Marital status: Single       Spouse name: Not on file    Number of children: Not on file    Years of education: Not on file    Highest education level: Not on file   Occupational History    Not on file   Tobacco Use    Smoking status: Never    Smokeless tobacco: Not on file   Substance and Sexual Activity    Alcohol use: No    Drug use: No    Sexual activity: Yes       Partners: Male   Other Topics Concern    Not on file   Social History Narrative    Not on file      Social Determinants of Health      Financial Resource Strain: Not on file   Food Insecurity: Not on file   Transportation Needs: Not on file   Physical Activity: Not on file   Stress: Not on file   Social Connections: Not on file   Interpersonal Safety: Not on file   Housing Stability: Not on file            Current Outpatient Medications   Medication Sig Dispense Refill    dalfampridine (AMPYRA) 10 MG TB12 12 hr tablet Take 1 tablet (10 mg) by mouth 2 times daily 60 tablet 11    methylphenidate (RITALIN) 20 MG tablet Take 1 tablet (20 mg) by mouth 3 times daily 90 tablet 0    tolterodine (DETROL) 2 MG tablet Take 1 tablet (2 mg) by mouth daily 30 tablet 1    valACYclovir (VALTREX) 500 MG tablet Take 1 tablet (500 mg) by mouth daily 90 tablet 3    venlafaxine (EFFEXOR-ER) 225 MG 24 hr tablet Take 225 mg by mouth daily        On examination, patient is alert and cooperative.  Vitals are stable.  She is afebrile.  HEENT is negative.  Extraocular movements are intact.  Face is symmetric.  Tongue is midline neck is supple.  She is able to move her upper extremities functionally.  She has some difficulty with hand use.  Strength is  full.  In the lower extremities she is able to lift her lower extremities left side better than the right side.  Right hip flexor strength is in the 4- out of 5.  She is able to extend the knees and flex again right side weaker than the left.  She has difficulty with ankle dorsiflexion on the right compared to the left.  There is improved tone in the flexor digitorum longus bilaterally, gastrocnemius, and hamstrings on either side.  I could not elicit a catch in her quadriceps with knee flexion.     She is able to get up and walk around without significant difficulty.     Impression: At this point this 34-year-old woman with multiple sclerosis affecting sensation and causing spasticity with fatigue and bladder disturbance.  She has had occasional falls especially when she is carrying stuff over uneven surface.  She will continue to watch for proper lighting, and has already noted improvement in her bladder dysfunction.  She will continue her Detrol for this.     She has done well with the recent Botox and we will repeat it when she returns for follow-up 12 weeks from 8/15/2024.    Was reviewed at length with the patient.  All questions were answered to her satisfaction.    30 minutes visit, greater than 50% was for counseling on above-mentioned issues.        Solo Rothman MD

## 2024-09-16 ENCOUNTER — THERAPY VISIT (OUTPATIENT)
Dept: PHYSICAL THERAPY | Facility: CLINIC | Age: 34
End: 2024-09-16
Attending: PSYCHIATRY & NEUROLOGY
Payer: COMMERCIAL

## 2024-09-16 DIAGNOSIS — R25.2 SPASTICITY: ICD-10-CM

## 2024-09-16 DIAGNOSIS — G82.20 PARAPARESIS (H): ICD-10-CM

## 2024-09-16 DIAGNOSIS — G35 MS (MULTIPLE SCLEROSIS) (H): Primary | ICD-10-CM

## 2024-09-16 PROCEDURE — 97112 NEUROMUSCULAR REEDUCATION: CPT | Mod: GP

## 2024-09-16 PROCEDURE — 97110 THERAPEUTIC EXERCISES: CPT | Mod: GP

## 2024-09-17 ENCOUNTER — MYC REFILL (OUTPATIENT)
Dept: NEUROLOGY | Facility: CLINIC | Age: 34
End: 2024-09-17
Payer: COMMERCIAL

## 2024-09-17 DIAGNOSIS — G35 MS (MULTIPLE SCLEROSIS) (H): ICD-10-CM

## 2024-09-17 DIAGNOSIS — R53.82 CHRONIC FATIGUE: ICD-10-CM

## 2024-09-17 RX ORDER — METHYLPHENIDATE HYDROCHLORIDE 20 MG/1
20 TABLET ORAL 3 TIMES DAILY
Qty: 90 TABLET | Refills: 0 | Status: SHIPPED | OUTPATIENT
Start: 2024-09-17

## 2024-09-17 NOTE — TELEPHONE ENCOUNTER
Patient requesting refill of their methylphenidate; Patient was last seen in June 2024 and has follow up appointment in Dec 2024 with Dr Sandoval. Pended rx to Dr Sandoval for signature and will send electronically to the pharmacy once signed.    Vidya Weinberg RN

## 2024-09-19 ENCOUNTER — VIRTUAL VISIT (OUTPATIENT)
Dept: NEUROLOGY | Facility: CLINIC | Age: 34
End: 2024-09-19
Attending: PSYCHIATRY & NEUROLOGY
Payer: COMMERCIAL

## 2024-09-19 ENCOUNTER — THERAPY VISIT (OUTPATIENT)
Dept: OCCUPATIONAL THERAPY | Facility: REHABILITATION | Age: 34
End: 2024-09-19
Payer: COMMERCIAL

## 2024-09-19 ENCOUNTER — TELEPHONE (OUTPATIENT)
Dept: NEUROLOGY | Facility: CLINIC | Age: 34
End: 2024-09-19

## 2024-09-19 DIAGNOSIS — R53.82 CHRONIC FATIGUE: ICD-10-CM

## 2024-09-19 DIAGNOSIS — M62.81 GENERALIZED MUSCLE WEAKNESS: Primary | ICD-10-CM

## 2024-09-19 DIAGNOSIS — Z78.9 IMPAIRED INSTRUMENTAL ACTIVITIES OF DAILY LIVING: ICD-10-CM

## 2024-09-19 DIAGNOSIS — G35 MS (MULTIPLE SCLEROSIS) (H): Primary | ICD-10-CM

## 2024-09-19 DIAGNOSIS — G35 MS (MULTIPLE SCLEROSIS) (H): ICD-10-CM

## 2024-09-19 PROCEDURE — 97535 SELF CARE MNGMENT TRAINING: CPT | Mod: GO | Performed by: OCCUPATIONAL THERAPIST

## 2024-09-19 RX ORDER — LURASIDONE HYDROCHLORIDE 20 MG/1
20 TABLET, FILM COATED ORAL DAILY
COMMUNITY
Start: 2024-09-12

## 2024-09-19 NOTE — TELEPHONE ENCOUNTER
----- Message from Sushila Sung sent at 9/19/2024  9:45 AM CDT -----  Regarding: Please call patient to triage worsening symptoms  Hi Team -     Could someone please give Mary a call today? She has been having worsening MS symptoms over the past few months and symptoms are the worst they have ever been. She has an MRI and visit with Dr. Sandoval scheduled for December.     Thanks!   Sushila Sung, Pharm.D., MPH  Medication Therapy Management Pharmacist   [facility]

## 2024-09-19 NOTE — PROGRESS NOTES
Medication Therapy Management (MTM) Encounter    ASSESSMENT:                          Medication Adherence/Access: See below for considerations    MS:  Patient due for lab monitoring as it has been about 6 months since she started her first round of Mavenclad. Message sent to provider who approved labs. Informed MS nurses on patient's worsening symptoms and requested they call patient to triage new symptoms and relay information onto neurologist for recommendations.     PLAN:                            Medication list reviewed   I will have Dr. Sandoval's nursing team reach out to you regarding worsening symptoms and potential next steps   You are due for another round of labs. Please get those completed at any [facility] location at your earliest convenience.    Follow-up:   Appointments in Next Year      Sep 23, 2024 8:45 AM  PT Neuro Treatment with Montana Bailey PT  [facility] ([facility] ) [phone]     Sep 30, 2024 8:45 AM  PT Neuro Treatment with Montana Bailey PT  [facility] ([facility] ) [phone]     Oct 01, 2024 9:15 AM  OT Neuro Treatment with Dodie Stewart OT  [facility] ([facility] ) [phone]     Nov 07, 2024 11:00 AM  (Arrive by 10:45 AM)  Neurotoxin with Solo Rtohman MD  [facility] [place] ([facility] ) [phone]     Dec 27, 2024 11:30 AM  (Arrive by 11:00 AM)  MR BRAIN THORACIC CERVICAL SPINE WWO COMBO with [id number]  [facility] [place] ([facility] ) [phone]     Dec 27, 2024 2:00 PM  (Arrive by 1:45 PM)  Return MS with Darby Sandoval MD  [facility]  [facility] [place] ([facility] ) [phone]     Feb 20, 2025 8:30 AM  Pharmacist Visit with Sushila Sung [facility] Neurology [facility] ([facility] ) [phone]          SUBJECTIVE/OBJECTIVE:                          Mary Evans is a 34 year old female seen for a follow-up visit.       Reason for visit: Follow Up.    Allergies/ADRs: Reviewed in chart  Past Medical History: Reviewed in chart  Tobacco: She reports that she has never smoked. She does not have any smokeless tobacco history on file.  Alcohol: not discussed today    Medication Adherence/Access: no issues reported    MS:   - Dalfampridine 10mg twice daily   - Methylphenidate 60mg every day (prescribed 20mg three times daily). She reports that the only effective dose is if she takes 60mg at a time and it is only minimally effective. She also tries to go a few days without it as tends to work less the more often she takes it.    - Tolterodine 2mg every day   - Patient completed first round of Mavenclad (first cycle in March 2024 and second cycle in April 2024). Due for second round starting in March 2025.   - Botox every 3 months     Currently patient is not doing well, she is struggling with incontinence, fatigue, balance issues, and weakness. She notes this is the worst she has ever felt in the past 15 years of having MS. Symptoms have gradually gotten worse over the past 4-6 months. She does have an upcoming MRI and appointment with her neurologist in December 2024.     Today's Vitals: There were no vitals taken for this visit.  ----------------    I spent 10 minutes with this patient today. I offer these suggestions for consideration by Dr. Sandoval. A copy of the visit note was provided to the patient's provider(s).    A summary of these recommendations was sent via [employer].    Sushila Sung, Pharm.D., MPH  Medication Therapy Management  Pharmacist   [facility]    Telemedicine Visit Details  Type of service:  Telephone visit  Start Time: 8:35 AM  End Time:  8:45 AM     Medication Therapy Recommendations  No medication therapy recommendations to display

## 2024-09-19 NOTE — LETTER
October 8, 2024      Mary Evans  [address]   [place] [address]        Dear Mary,     Our Neurology Clinic has been trying to reach you about recent symptom concerns related to MS. We've been unable to reach you by phone. Please call [phone] to reach our triage nurse team.      Sincerely,    [facility] Neurology

## 2024-09-19 NOTE — CONFIDENTIAL NOTE
Dr. Sandoval - please advise if you would like patient to get CBC with differential as it has been about 6 months since she started Mavenclad.     Sushila Sung, Pharm.D., MPH  Medication Therapy Management Pharmacist   [facility] Neurology [facility]

## 2024-09-19 NOTE — Clinical Note
9/19/2024       RE: Mary Evans  [address]  [place] [address]     Dear Colleague,    Thank you for referring your patient, Mary Evans, to the [facility] [place] at [facility]. Please see a copy of my visit note below.    Medication Therapy Management (MTM) Encounter    ASSESSMENT:                            Medication Adherence/Access: See below for considerations    ***:  ***      PLAN:                            ***    Follow-up:   Appointments in Next Year      Sep 23, 2024 8:45 AM  PT Neuro Treatment with Montana Bailey PT  [facility] ([facility] ) [phone]     Sep 30, 2024 8:45 AM  PT Neuro Treatment with Montana Bailey PT  [facility] ([facility] ) [phone]     Oct 01, 2024 9:15 AM  OT Neuro Treatment with Dodie Stewart OT  [facility] ([facility] ) [phone]     Nov 07, 2024 11:00 AM  (Arrive by 10:45 AM)  Neurotoxin with Solo Rothman MD  [facility] Physical Medicine and Rehabilitation [facility] [place] ([facility] ) [phone]     Dec 27, 2024 11:30 AM  (Arrive by 11:00 AM)  MR BRAIN THORACIC CERVICAL SPINE WWO COMBO with [id number]  [facility] ) [phone]     Dec 27, 2024 2:00 PM  (Arrive by 1:45 PM)  Return MS with Darby Sandoval MD  [facility] Multiple Sclerosis Clinic [place] ([facility] [place] ) [phone]     Feb 20, 2025 8:30 AM  Pharmacist Visit with Sushila Sung [facility]rology [place] ([facility] [place] ) [phone]            SUBJECTIVE/OBJECTIVE:                          Mary Evans is a 34 year old female seen for a follow-up visit.       Reason for visit: Follow Up.    Allergies/ADRs: Reviewed in chart  Past Medical History: Reviewed in chart  Tobacco: She reports that she has never smoked. She does not have any smokeless tobacco history on file.  Alcohol: not discussed today    Medication Adherence/Access: no issues reported    ***:   ***        Incontinence, fatigue, balance issues, weakness - worse her symptoms have ever been sicne she was diagnoised. Gradually gotten worse over the past 6 months.     Today's Vitals: There were no vitals taken for this visit.  ----------------  {MANUELA?:[id number]}    I spent 10 minutes with this patient today. I offer these suggestions for consideration by Dr. Sandoval. A copy of the visit note was provided to the patient's provider(s).    A summary of these recommendations was sent via [employer].    Sushila Sung, Pharm.D., MPH  Medication Therapy Management Pharmacist   [facility]rology Clinic    Telemedicine Visit Details  Type of service:  Telephone visit  Start Time: 8:35 AM  End Time:  8:45 AM     Medication Therapy Recommendations  No medication therapy recommendations to display       Medication Therapy Management (MTM) Encounter    ASSESSMENT:                            Medication Adherence/Access: See below for considerations    MS:  Mike due for lab monitoring as it has been about 6 months since she started Mavenclad. Message sent to provider to approve labs. Informed MS nurse on if ***      PLAN:                            Medication list reviewed   I will have Dr. Sandoval's nursing team reach out to you regarding worsening symptoms and potential next steps   You are due for another round of labs. Please get those completed at any [facility] location at your earliest convenience. If you would like to complete them at an outside  facility, please let me know. ***    Follow-up:   Appointments in Next Year      Sep 23, 2024 8:45 AM  PT Neuro Treatment with Montana Bailey PT  [facility] [place] ([facility] ) [phone]     Sep 30, 2024 8:45 AM  PT Neuro Treatment with Montana Bailey PT  [facility] [place] ([facility] ) [phone]     Oct 01, 2024 9:15 AM  OT Neuro Treatment with Dodie Stewart OT  [facility] ([facility] ) [phone]     Nov 07, 2024 11:00 AM  (Arrive by 10:45 AM)  Neurotoxin with Solo Rothman MD  [facility] [place] ([facility] [place] ) [phone]     Dec 27, 2024 11:30 AM  (Arrive by 11:00 AM)  MR BRAIN THORACIC CERVICAL SPINE WWO COMBO with [id number]  [facility] [place] ([facility] [place] ) [phone]     Dec 27, 2024 2:00 PM  (Arrive by 1:45 PM)  Return MS with Darby Sandoval MD  [facility] Multiple Sclerosis [facility] [place] ([facility] [place] ) [phone]     Feb 20, 2025 8:30 AM  Pharmacist Visit with Sushila Sung RPH  [facility] Neurology [facility] ([facility] ) [phone]          SUBJECTIVE/OBJECTIVE:                          Mary Evans is a 34 year old female seen for a follow-up visit.       Reason for visit: Follow Up.    Allergies/ADRs: Reviewed in chart  Past Medical History: Reviewed in chart  Tobacco: She reports that she has never smoked. She does not have any smokeless tobacco history on file.  Alcohol: not discussed today    Medication Adherence/Access: no issues reported    MS:   - Dalfampridine 10mg twice  daily   - Methylphenidate 60mg every day (prescribed 20mg three times daily). She reports that the only effective dose is if she takes 60mg at a time and it is only minimally effective. She also tries to go a few days without it as tends to work less the more often she takes it.    - Tolterodine 2mg every day   - Patient completed first round of Mavenclad (first cycle in March 2024 and second cycle in April 2024). Due for second round starting in March 2025.   - Botox every 3 months     Currently patient is not doing well, she is struggling with incontinence, fatigue, balance issues, and weakness. She notes this is the worst she has ever felt in the past 15 years of having MS. Symptoms have gradually gotten worse over the past 4-6 months. She does have an upcoming MRI and appointment with her neurologist in December 2024.     Today's Vitals: There were no vitals taken for this visit.  ----------------    I spent 10 minutes with this patient today. I offer these suggestions for consideration by Dr. Sandoval. A copy of the visit note was provided to the patient's provider(s).    A summary of these recommendations was sent via [employer].    Sushila Sung, Pharm.D., MPH  Medication Therapy Management Pharmacist   [facility] Neurology Clinic    Telemedicine Visit Details  Type of service:  Telephone visit  Start Time: 8:35 AM  End Time:  8:45 AM     Medication Therapy Recommendations  No medication therapy recommendations to display         Again, thank you for allowing me to participate in the care of your patient.      Sincerely,    Sushila Sung, [facility]

## 2024-09-20 NOTE — PATIENT INSTRUCTIONS
Recommendations from today's MTM visit:                                                    Medication list reviewed   I will have Dr. Sandoval's nursing team reach out to you regarding worsening symptoms and potential next steps   You are due for another round of labs. Please get those completed at any [facility] location at your earliest convenience.    Follow-up:   Appointments in Next Year      Sep 23, 2024 8:45 AM  PT Neuro Treatment with Montana Bailey, PT  [facility] ([facility] ) [phone]     Sep 30, 2024 8:45 AM  PT Neuro Treatment with Montana Bailey, PT  [facility] ([facility] ) [phone]     Oct 01, 2024 9:15 AM  OT Neuro Treatment with Dodie Stewart OT  [facility] ([facility] ) [phone]     Nov 07, 2024 11:00 AM  (Arrive by 10:45 AM)  Neurotoxin with Solo Rothman MD  [facility] [place] ([facility] ) [phone]     Dec 27, 2024 11:30 AM  (Arrive by 11:00 AM)  MR BRAIN THORACIC CERVICAL SPINE WWO COMBO with [id number]  [facility] ) [phone]     Dec 27, 2024 2:00 PM  (Arrive by 1:45 PM)  Return MS with Darby Sandoval MD  [facility] Sclerosis [facility] [place] ([facility] ) [phone]     Feb 20, 2025 8:30 AM  Pharmacist Visit with Sushila Sung [facility] Neurology [facility] ([facility] ) [phone]            It was great speaking with you today.  I value your experience and would be very thankful for your time in providing feedback in our  "clinic survey. In the next few days, you may receive an email or text message from [facility] Drop Messages with a link to a survey related to your  clinical pharmacist.\"     To schedule another MTM appointment, please call the clinic directly or you may call the MTM scheduling line at [phone].    My Clinical Pharmacist's contact information:                                                      Please feel free to contact me with any questions or concerns you have.      Sushila Sung, Pharm.D., MPH  Medication Therapy Management Pharmacist   [facility] Neurology [facility]   "

## 2024-09-26 ENCOUNTER — TELEPHONE (OUTPATIENT)
Dept: PHYSICAL THERAPY | Facility: CLINIC | Age: 34
End: 2024-09-26
Payer: COMMERCIAL

## 2024-09-26 NOTE — TELEPHONE ENCOUNTER
Called and left message for patient, requested a call back to clinic to discuss MS symptoms.    Be Cruz RN, BSN  [facility] Neurology

## 2024-09-30 ENCOUNTER — THERAPY VISIT (OUTPATIENT)
Dept: PHYSICAL THERAPY | Facility: CLINIC | Age: 34
End: 2024-09-30
Attending: PSYCHIATRY & NEUROLOGY
Payer: COMMERCIAL

## 2024-09-30 DIAGNOSIS — G35 MS (MULTIPLE SCLEROSIS) (H): Primary | ICD-10-CM

## 2024-09-30 DIAGNOSIS — R25.2 SPASTICITY: ICD-10-CM

## 2024-09-30 DIAGNOSIS — G82.20 PARAPARESIS (H): ICD-10-CM

## 2024-09-30 PROCEDURE — 97530 THERAPEUTIC ACTIVITIES: CPT | Mod: GP

## 2024-09-30 PROCEDURE — 97110 THERAPEUTIC EXERCISES: CPT | Mod: GP

## 2024-09-30 PROCEDURE — 97112 NEUROMUSCULAR REEDUCATION: CPT | Mod: GP

## 2024-10-01 ENCOUNTER — LAB (OUTPATIENT)
Dept: LAB | Facility: CLINIC | Age: 34
End: 2024-10-01
Payer: COMMERCIAL

## 2024-10-01 ENCOUNTER — THERAPY VISIT (OUTPATIENT)
Dept: OCCUPATIONAL THERAPY | Facility: REHABILITATION | Age: 34
End: 2024-10-01
Payer: COMMERCIAL

## 2024-10-01 DIAGNOSIS — Z78.9 IMPAIRED INSTRUMENTAL ACTIVITIES OF DAILY LIVING: ICD-10-CM

## 2024-10-01 DIAGNOSIS — R53.82 CHRONIC FATIGUE: ICD-10-CM

## 2024-10-01 DIAGNOSIS — G35 MS (MULTIPLE SCLEROSIS) (H): ICD-10-CM

## 2024-10-01 DIAGNOSIS — M62.81 GENERALIZED MUSCLE WEAKNESS: Primary | ICD-10-CM

## 2024-10-01 LAB
BASOPHILS # BLD AUTO: 0 10E3/UL (ref 0–0.2)
BASOPHILS NFR BLD AUTO: 1 %
EOSINOPHIL # BLD AUTO: 0 10E3/UL (ref 0–0.7)
EOSINOPHIL NFR BLD AUTO: 1 %
ERYTHROCYTE [DISTWIDTH] IN BLOOD BY AUTOMATED COUNT: 12.7 % (ref 10–15)
HCT VFR BLD AUTO: 40 % (ref 35–47)
HGB BLD-MCNC: 13.7 G/DL (ref 11.7–15.7)
IMM GRANULOCYTES # BLD: 0 10E3/UL
IMM GRANULOCYTES NFR BLD: 1 %
LYMPHOCYTES # BLD AUTO: 0.8 10E3/UL (ref 0.8–5.3)
LYMPHOCYTES NFR BLD AUTO: 19 %
MCH RBC QN AUTO: 33.2 PG (ref 26.5–33)
MCHC RBC AUTO-ENTMCNC: 34.3 G/DL (ref 31.5–36.5)
MCV RBC AUTO: 97 FL (ref 78–100)
MONOCYTES # BLD AUTO: 0.3 10E3/UL (ref 0–1.3)
MONOCYTES NFR BLD AUTO: 7 %
NEUTROPHILS # BLD AUTO: 3 10E3/UL (ref 1.6–8.3)
NEUTROPHILS NFR BLD AUTO: 73 %
NRBC # BLD AUTO: 0 10E3/UL
NRBC BLD AUTO-RTO: 0 /100
PLATELET # BLD AUTO: 206 10E3/UL (ref 150–450)
RBC # BLD AUTO: 4.13 10E6/UL (ref 3.8–5.2)
WBC # BLD AUTO: 4.2 10E3/UL (ref 4–11)

## 2024-10-01 PROCEDURE — 36415 COLL VENOUS BLD VENIPUNCTURE: CPT

## 2024-10-01 PROCEDURE — 97535 SELF CARE MNGMENT TRAINING: CPT | Mod: GO | Performed by: OCCUPATIONAL THERAPIST

## 2024-10-01 PROCEDURE — 85025 COMPLETE CBC W/AUTO DIFF WBC: CPT

## 2024-10-01 PROCEDURE — 97110 THERAPEUTIC EXERCISES: CPT | Mod: GO | Performed by: OCCUPATIONAL THERAPIST

## 2024-10-02 ENCOUNTER — TELEPHONE (OUTPATIENT)
Dept: NEUROLOGY | Facility: CLINIC | Age: 34
End: 2024-10-02
Payer: COMMERCIAL

## 2024-10-03 NOTE — TELEPHONE ENCOUNTER
Prior Authorization Approval    Medication: DALFAMPRIDINE ER 10 MG PO TB12  Authorization Effective Date: 10/3/2024  Authorization Expiration Date: 10/3/2025  Approved Dose/Quantity: 30 days  Reference #:     Insurance Company: [employer] - Phone [phone] Fax [phone]  Expected CoPay: $    CoPay Card Available:      Financial Assistance Needed:   Which Pharmacy is filling the prescription: [facility] - [place], [place] - [address]  Pharmacy Notified: Yes  Patient Notified: Yes            Thank you,    Romelia Guidry [facility]  Specialty Pharmacy Clinic Liaison - [facility] [address]  [address] [place] [address]  Ph: [phone] Fax: [phone]  [email or web address]

## 2024-10-08 NOTE — TELEPHONE ENCOUNTER
Unable to reach patient.    Staff has made multiple attempts to reach the patient.    After a 3rd attempt to call and contact the patient, a letter has been composed and mailed advising patient to reconnect with our clinic to discuss concerns they may have.    Be Cruz RN, BSN  [facility] Neurology

## 2024-10-09 ENCOUNTER — LAB REQUISITION (OUTPATIENT)
Dept: LAB | Facility: CLINIC | Age: 34
End: 2024-10-09

## 2024-10-09 DIAGNOSIS — Z11.3 ENCOUNTER FOR SCREENING FOR INFECTIONS WITH A PREDOMINANTLY SEXUAL MODE OF TRANSMISSION: ICD-10-CM

## 2024-10-09 LAB
HBV SURFACE AG SERPL QL IA: NONREACTIVE
HCV AB SERPL QL IA: NONREACTIVE
HIV 1+2 AB+HIV1 P24 AG SERPL QL IA: NONREACTIVE
T PALLIDUM AB SER QL: NONREACTIVE

## 2024-10-09 PROCEDURE — 87340 HEPATITIS B SURFACE AG IA: CPT | Performed by: NURSE PRACTITIONER

## 2024-10-09 PROCEDURE — 87491 CHLMYD TRACH DNA AMP PROBE: CPT | Performed by: NURSE PRACTITIONER

## 2024-10-09 PROCEDURE — 86780 TREPONEMA PALLIDUM: CPT | Performed by: NURSE PRACTITIONER

## 2024-10-09 PROCEDURE — 87389 HIV-1 AG W/HIV-1&-2 AB AG IA: CPT | Performed by: NURSE PRACTITIONER

## 2024-10-09 PROCEDURE — 86803 HEPATITIS C AB TEST: CPT | Performed by: NURSE PRACTITIONER

## 2024-10-10 LAB
C TRACH DNA SPEC QL PROBE+SIG AMP: NEGATIVE
N GONORRHOEA DNA SPEC QL NAA+PROBE: NEGATIVE

## 2024-10-25 ENCOUNTER — THERAPY VISIT (OUTPATIENT)
Dept: PHYSICAL THERAPY | Facility: CLINIC | Age: 34
End: 2024-10-25
Attending: PSYCHIATRY & NEUROLOGY
Payer: COMMERCIAL

## 2024-10-25 DIAGNOSIS — G35 MS (MULTIPLE SCLEROSIS) (H): Primary | ICD-10-CM

## 2024-10-25 DIAGNOSIS — G82.20 PARAPARESIS (H): ICD-10-CM

## 2024-10-25 DIAGNOSIS — R25.2 SPASTICITY: ICD-10-CM

## 2024-10-25 PROCEDURE — 97112 NEUROMUSCULAR REEDUCATION: CPT | Mod: GP

## 2024-11-07 ENCOUNTER — OFFICE VISIT (OUTPATIENT)
Dept: PHYSICAL MEDICINE AND REHAB | Facility: CLINIC | Age: 34
End: 2024-11-07
Payer: COMMERCIAL

## 2024-11-07 ENCOUNTER — THERAPY VISIT (OUTPATIENT)
Dept: PHYSICAL THERAPY | Facility: CLINIC | Age: 34
End: 2024-11-07
Attending: PSYCHIATRY & NEUROLOGY
Payer: COMMERCIAL

## 2024-11-07 DIAGNOSIS — R26.9 ABNORMAL GAIT: ICD-10-CM

## 2024-11-07 DIAGNOSIS — M62.838 SPASM OF MUSCLE: ICD-10-CM

## 2024-11-07 DIAGNOSIS — G35 MS (MULTIPLE SCLEROSIS) (H): Primary | ICD-10-CM

## 2024-11-07 DIAGNOSIS — R26.81 GAIT INSTABILITY: ICD-10-CM

## 2024-11-07 DIAGNOSIS — R25.2 SPASTICITY: ICD-10-CM

## 2024-11-07 PROCEDURE — 99213 OFFICE O/P EST LOW 20 MIN: CPT | Mod: 25 | Performed by: PHYSICAL MEDICINE & REHABILITATION

## 2024-11-07 PROCEDURE — 97530 THERAPEUTIC ACTIVITIES: CPT | Mod: GP

## 2024-11-07 PROCEDURE — 97112 NEUROMUSCULAR REEDUCATION: CPT | Mod: GP

## 2024-11-07 PROCEDURE — 64645 CHEMODENERV 1 EXTREM 5/> EA: CPT | Performed by: PHYSICAL MEDICINE & REHABILITATION

## 2024-11-07 PROCEDURE — 64644 CHEMODENERV 1 EXTREM 5/> MUS: CPT | Performed by: PHYSICAL MEDICINE & REHABILITATION

## 2024-11-07 PROCEDURE — 95874 GUIDE NERV DESTR NEEDLE EMG: CPT | Performed by: PHYSICAL MEDICINE & REHABILITATION

## 2024-11-07 ASSESSMENT — PAIN SCALES - GENERAL: PAINLEVEL_OUTOF10: MODERATE PAIN (4)

## 2024-11-07 NOTE — LETTER
11/7/2024       RE: Mary Evans  [address]  [place] [address]     Dear Colleague,    Thank you for referring your patient, Mary Evans, to the [facility] PHYSICAL MEDICINE AND REHABILITATION CLINIC [place] at [facility]. Please see a copy of my visit note below.    Here for evaluation of spasticity and treatment with Botox.     Patient is a very pleasant 34-year-old woman who reports having MS for the past 15 years.  She has a remitting, relapsing course and experiences significant fatigue.  She has experienced tightness especially in her lower extremities and clumsiness and difficulty using her hands for fine motor tasks.  She has a right ankle-foot orthosis that she sometimes finds it difficult to wear and is not wearing it today.  She has had falls one of them was when she was carrying a laundry basket up the stairs.  She also reports urgency and difficulty controlling her urination.  She denies any bowel issues.  She has been taking Ritalin to deal with her fatigue.  She would like to improve the tightness in her toes ankles as well as the knees.  She denies any particular area of pain or discomfort.    Her last Botox injection was on 8/15/2024.At that time she was noted to have involvement of multiple muscles in the lower extremities resulting in gait disturbance. She received Botox injections and feels it has been very helpful. She has been using a single ended cane as needed. She continues to have an ankle-foot orthosis on the right side. She has been receiving physical therapy. She has also found Detrol to be very helpful in reducing her bladder urgency and frequency.     She has also noticed some tingling in her right third fourth and fifth digits.  It is worse when she is typing or writing or walking with her cane which she uses on the right side.  She denies any radicular symptoms.  She does not have involvement of  the thumb or the index finger on the right.  Left side is not affected.     Past Medical History           Past Medical History:   Diagnosis Date     Multiple sclerosis (H)           Past Surgical History             Past Surgical History:   Procedure Laterality Date     C/SECTION, LOW TRANSVERSE   2016     bree[place] / dr tala moreno / [facility]       SECTION         DILATION AND CURETTAGE         HYSTERECTOMY, TOTAL, ROBOT-ASSISTED, USING DA AMADO XI, WITH SALPINGECTOMY Bilateral 10/31/2022     Procedure: ROBOTIC TOTAL LAPAROSCOPIC HYSTERECTOMY, BILATERAL SALPINGECTOMY, CYSTOSCOPY;  Surgeon: Tala Moreno DO;  Location: [facility] Main OR     OTHER SURGICAL HISTORY         uterine septum revision         Family History            Problem (# of Occurrences) Relation (Name,Age of Onset)     Cancer (2) Maternal Grandfather: lung, Paternal Grandfather: lung     Urolithiasis (1) Mother                Social History   Social History                Socioeconomic History     Marital status: Single       Spouse name: Not on file     Number of children: Not on file     Years of education: Not on file     Highest education level: Not on file   Occupational History     Not on file   Tobacco Use     Smoking status: Never     Smokeless tobacco: Not on file   Substance and Sexual Activity     Alcohol use: No     Drug use: No     Sexual activity: Yes       Partners: Male   Other Topics Concern     Not on file   Social History Narrative     Not on file      Social Determinants of Health      Financial Resource Strain: Not on file   Food Insecurity: Not on file   Transportation Needs: Not on file   Physical Activity: Not on file   Stress: Not on file   Social Connections: Not on file   Interpersonal Safety: Not on file   Housing Stability: Not on file            Current Outpatient Medications   Medication Sig Dispense Refill     dalfampridine (AMPYRA) 10 MG TB12 12 hr tablet Take 1 tablet (10 mg) by mouth 2  times daily 60 tablet 11     lurasidone (LATUDA) 20 MG TABS tablet Take 20 mg by mouth daily. Titrating up to 60mg every day after month at 20mg every day       methylphenidate (RITALIN) 20 MG tablet Take 1 tablet (20 mg) by mouth 3 times daily. (Patient taking differently: Take 60 mg by mouth daily.) 90 tablet 0     tolterodine (DETROL) 2 MG tablet Take 1 tablet (2 mg) by mouth daily 30 tablet 1     valACYclovir (VALTREX) 500 MG tablet Take 1 tablet (500 mg) by mouth daily (Patient taking differently: Take 500 mg by mouth daily as needed (cold sores).) 90 tablet 3     venlafaxine (EFFEXOR-ER) 225 MG 24 hr tablet Take 225 mg by mouth daily                On examination, patient is alert and cooperative.  Vitals are stable.  She is afebrile.  HEENT is negative.  Extraocular movements are intact.  Face is symmetric.  Tongue is midline neck is supple.  She is able to move her upper extremities functionally.  She has some difficulty with hand use.  Strength is full.  In the lower extremities she is able to lift her lower extremities left side better than the right side.  Right hip flexor strength is in the 4- out of 5.  She is able to extend the knees and flex again right side weaker than the left.  She has difficulty with ankle dorsiflexion on the right compared to the left.  There is increased tone in the flexor digitorum longus bilaterally gastrocnemius right worse than the left as well as some hamstring involvement right worse than the left.  I could not elicit a catch in her quadriceps with knee flexion.    She has mild tenderness over the ulnar nerve at the elbow.  Tinel's was positive.  Right side was worse than the left.     Romberg is positive.  Gait is characterized by spasticity and some scissoring.  There is some involvement of the tibialis posterior.  There is toe flexion noted as above.  Finger stenosis of on the left upper extremity compared to the right.     Impression: At this point this 34-year-old  woman with multiple sclerosis affecting sensation and causing spasticity with fatigue and bladder disturbance.  She has had occasional falls especially when she is carrying stuff over uneven surface.  We talked about proper lighting, and also potential for improving bladder function to decrease falls.  She has been taking Detrol 2 mg in the morning and it is helping in reducing the urgency during the daytime.  For her spasticity, we will go with 400 units of Botox to treat the various muscles on either side of the lower extremities.  I reviewed the benefits and risks of the procedure including onset within 1 to 3 days, peak in 2 weeks and a duration of 12 weeks.  It may take 1-3 rounds to optimize the site as well as the dosage.      For her right-sided tingling, I encouraged her to watch her position and avoid any compression to the ulnar nerve along the inner forearm.  If it persists, additional testing and/or treatment could be considered.  She also uses a single ended cane, I have suggested using the walker as it might be easier and limit any rotational components.        PROCEDURE NOTE: With her informed consent, after explaining the benefits and risks of the procedure, using preservative-free normal saline for dilution 1 cc per 100 units 300 units of Botox were utilized for injecting with EMG guidance as follows.  50 units were injected into the flexor digitorum longus on either side, and 50 units were injected into the tibialis posterior on either side.  50 units were divided amongst the medial and lateral heads of the gastrocnemius and 50 units were injected into the hamstrings on either side.  300 units were utilized in all.  She tolerated the procedure well.     She can ice the region, take Tylenol as needed for pain.  I will see her in follow-up in 2 weeks for Botox and again 4 weeks after that for a follow-up.     20 minutes visit, exclusive of the procedure time, greater than 50% was for counseling on  above-mentioned issues.        Solo Rothman MD       Again, thank you for allowing me to participate in the care of your patient.      Sincerely,    Solo Rothman MD

## 2024-11-07 NOTE — PROGRESS NOTES
Here for evaluation of spasticity and treatment with Botox.     Patient is a very pleasant 34-year-old woman who reports having MS for the past 15 years.  She has a remitting, relapsing course and experiences significant fatigue.  She has experienced tightness especially in her lower extremities and clumsiness and difficulty using her hands for fine motor tasks.  She has a right ankle-foot orthosis that she sometimes finds it difficult to wear and is not wearing it today.  She has had falls one of them was when she was carrying a laundry basket up the stairs.  She also reports urgency and difficulty controlling her urination.  She denies any bowel issues.  She has been taking Ritalin to deal with her fatigue.  She would like to improve the tightness in her toes ankles as well as the knees.  She denies any particular area of pain or discomfort.    Her last Botox injection was on 8/15/2024.At that time she was noted to have involvement of multiple muscles in the lower extremities resulting in gait disturbance. She received Botox injections and feels it has been very helpful. She has been using a single ended cane as needed. She continues to have an ankle-foot orthosis on the right side. She has been receiving physical therapy. She has also found Detrol to be very helpful in reducing her bladder urgency and frequency.     She has also noticed some tingling in her right third fourth and fifth digits.  It is worse when she is typing or writing or walking with her cane which she uses on the right side.  She denies any radicular symptoms.  She does not have involvement of the thumb or the index finger on the right.  Left side is not affected.     Past Medical History           Past Medical History:   Diagnosis Date    Multiple sclerosis (H)           Past Surgical History             Past Surgical History:   Procedure Laterality Date    C/SECTION, LOW TRANSVERSE   06/28/2016     piotr / dr kassie caruso / [facility]  [facility]      SECTION        DILATION AND CURETTAGE        HYSTERECTOMY, TOTAL, ROBOT-ASSISTED, USING DA AMADO XI, WITH SALPINGECTOMY Bilateral 10/31/2022     Procedure: ROBOTIC TOTAL LAPAROSCOPIC HYSTERECTOMY, BILATERAL SALPINGECTOMY, CYSTOSCOPY;  Surgeon: Tala Moreno DO;  Location: [facility] OR    OTHER SURGICAL HISTORY         uterine septum revision         Family History            Problem (# of Occurrences) Relation (Name,Age of Onset)     Cancer (2) Maternal Grandfather: lung, Paternal Grandfather: lung     Urolithiasis (1) Mother                Social History   Social History                Socioeconomic History    Marital status: Single       Spouse name: Not on file    Number of children: Not on file    Years of education: Not on file    Highest education level: Not on file   Occupational History    Not on file   Tobacco Use    Smoking status: Never    Smokeless tobacco: Not on file   Substance and Sexual Activity    Alcohol use: No    Drug use: No    Sexual activity: Yes       Partners: Male   Other Topics Concern    Not on file   Social History Narrative    Not on file      Social Determinants of Health      Financial Resource Strain: Not on file   Food Insecurity: Not on file   Transportation Needs: Not on file   Physical Activity: Not on file   Stress: Not on file   Social Connections: Not on file   Interpersonal Safety: Not on file   Housing Stability: Not on file            Current Outpatient Medications   Medication Sig Dispense Refill    dalfampridine (AMPYRA) 10 MG TB12 12 hr tablet Take 1 tablet (10 mg) by mouth 2 times daily 60 tablet 11    lurasidone (LATUDA) 20 MG TABS tablet Take 20 mg by mouth daily. Titrating up to 60mg every day after month at 20mg every day      methylphenidate (RITALIN) 20 MG tablet Take 1 tablet (20 mg) by mouth 3 times daily. (Patient taking differently: Take 60 mg by mouth daily.) 90 tablet 0    tolterodine (DETROL) 2 MG tablet Take 1 tablet (2 mg)  by mouth daily 30 tablet 1    valACYclovir (VALTREX) 500 MG tablet Take 1 tablet (500 mg) by mouth daily (Patient taking differently: Take 500 mg by mouth daily as needed (cold sores).) 90 tablet 3    venlafaxine (EFFEXOR-ER) 225 MG 24 hr tablet Take 225 mg by mouth daily                On examination, patient is alert and cooperative.  Vitals are stable.  She is afebrile.  HEENT is negative.  Extraocular movements are intact.  Face is symmetric.  Tongue is midline neck is supple.  She is able to move her upper extremities functionally.  She has some difficulty with hand use.  Strength is full.  In the lower extremities she is able to lift her lower extremities left side better than the right side.  Right hip flexor strength is in the 4- out of 5.  She is able to extend the knees and flex again right side weaker than the left.  She has difficulty with ankle dorsiflexion on the right compared to the left.  There is increased tone in the flexor digitorum longus bilaterally gastrocnemius right worse than the left as well as some hamstring involvement right worse than the left.  I could not elicit a catch in her quadriceps with knee flexion.    She has mild tenderness over the ulnar nerve at the elbow.  Tinel's was positive.  Right side was worse than the left.     Romberg is positive.  Gait is characterized by spasticity and some scissoring.  There is some involvement of the tibialis posterior.  There is toe flexion noted as above.  Finger stenosis of on the left upper extremity compared to the right.     Impression: At this point this 34-year-old woman with multiple sclerosis affecting sensation and causing spasticity with fatigue and bladder disturbance.  She has had occasional falls especially when she is carrying stuff over uneven surface.  We talked about proper lighting, and also potential for improving bladder function to decrease falls.  She has been taking Detrol 2 mg in the morning and it is helping in  reducing the urgency during the daytime.  For her spasticity, we will go with 400 units of Botox to treat the various muscles on either side of the lower extremities.  I reviewed the benefits and risks of the procedure including onset within 1 to 3 days, peak in 2 weeks and a duration of 12 weeks.  It may take 1-3 rounds to optimize the site as well as the dosage.      For her right-sided tingling, I encouraged her to watch her position and avoid any compression to the ulnar nerve along the inner forearm.  If it persists, additional testing and/or treatment could be considered.  She also uses a single ended cane, I have suggested using the walker as it might be easier and limit any rotational components.        PROCEDURE NOTE: With her informed consent, after explaining the benefits and risks of the procedure, using preservative-free normal saline for dilution 1 cc per 100 units 400 units of Botox were utilized for injecting with EMG guidance as follows.  50 units were injected into the flexor digitorum longus on either side, and 50 units were injected into the tibialis posterior on either side.  50 units were divided amongst the medial and lateral heads of the gastrocnemius and 50 units were injected into the hamstrings on either side.  400 units were utilized in all.  She tolerated the procedure well.     She can ice the region, take Tylenol as needed for pain.  I will see her in follow-up in 2 weeks for Botox and again 4 weeks after that for a follow-up.     20 minutes visit, exclusive of the procedure time, greater than 50% was for counseling on above-mentioned issues.        Solo Rothman MD

## 2024-11-08 DIAGNOSIS — G82.20 PARAPARESIS (H): ICD-10-CM

## 2024-11-08 DIAGNOSIS — G35 MS (MULTIPLE SCLEROSIS) (H): ICD-10-CM

## 2024-11-08 RX ORDER — DALFAMPRIDINE 10 MG/1
10 TABLET, FILM COATED, EXTENDED RELEASE ORAL 2 TIMES DAILY
Qty: 60 TABLET | Refills: 11 | Status: SHIPPED | OUTPATIENT
Start: 2024-11-08

## 2024-11-08 NOTE — PROGRESS NOTES
11/07/24 0500   Appointment Info   Signing clinician's name / credentials Nestor Bailey DPT   Total/Authorized Visits 10/10 until next progress note due   Visits Used 20 total visits - [facility]   Medical Diagnosis MS (multiple sclerosis) (H) (G35)   PT Tx Diagnosis Fractionated movement deficit   Progress Note/Certification   Start of Care Date 08/18/23   Onset of illness/injury or Date of Surgery 06/02/08   Therapy Frequency 1x/week   Predicted Duration 90 days   Certification date from 09/05/24   Certification date to 12/05/24   PT Goal 1   Goal Identifier HEP   Goal Description Pt will perform HEP with IND progressions and regressions to help manage symptoms long term.   Rationale to maximize safety and independence within the community   Goal Progress 9/16 - pt reports performing exercises at home intermittently. 1/25/24 - pt reports not able to perform ex in months, has stretched intermittently, not enough time d/t other responsibilities.10/30 - pt reports continuing to perform HEP at home. HEP issued   Target Date 12/05/24   PT Goal 2   Goal Identifier Static balance   Goal Description Pt will perform eyes closed Romberg stand with min sway for at least 30 sec and eyes closed foam pad stand for 30 sec (no sway limitations) to demonstrate improved balance without visual input.   Rationale to maximize safety and independence within the home   Goal Progress 11/7 - continuing to focus on using angle \board and uneven surfaces with UE activities to challenge balance and improve static balance during ADLs. 9/16 - focused on standing balance on angle board. 1/25/24 - not tested d/t fatigue. 10/30 - #2 for 30 sec and #5 for 30 sec, noting min-moderate sway during assessment. Eval: mCTSIB #2 (30 sec max sway), #4 (22 sec max sway, writer support to prevent LOB).   Target Date 12/05/24   PT Goal 3   Goal Identifier Falls   Goal Description Pt will report fewer than 1 fall per month to demonstate improved safety at  home and in the community.   Rationale to maximize safety and independence within the home;to maximize safety and independence within the community   Goal Progress 11/7 - continues to have frequent falls at home and in community. Have recommended braces ie AFO for RLE or ankle wrap brace, but pt prefers not to use per personal preference. 9/16/24 - pt reports falling this morning sliding off piano bench. 3/7/24 - pt reports daily falls at home. 1/25/24 - pt reports having multiple falls/week, seems to be getting worse. 10/30 - pt reports 4-5 falls/week. Eval: pt reports about 1 fall per week   Target Date 12/05/24   PT Goal 4   Goal Identifier Goal Discontinued - 6MWT   Goal Description Pt will show increase of at least 258 ft (1,548 total) to demonstrate improved activity tolerance.   Rationale to maximize safety and independence within the community   Goal Progress Discontinued as pt's MS progresses, decreasing in function. 3/7/24 - 840 ft, 4WW, 2 children present affecting performance. 1/25/24 - unable to assess d/t fatigue. 10/30 - 1228 ft, no AD, no rest breaks. Eval: 1,290 ft   Subjective Report   Subjective Report Pt reports having fall at the [employer] when visitnig with her family and boyfriend. Able to stand up with assist, no injury from fall. Doesn't report any falls at home at this time.   Therapeutic Activity   Therapeutic Activities: dynamic activities to improve functional performance minutes (74835) 8   Ther Act 1 POC   Ther Act 1 - Details Discussed current progress, falls at home. Plan for PT with ongoing medical management, reports having neurology end of Dec (trying to schedule earlier), plans to likely change medication and trial new meds. Reports increasing weakness in RUE and reported N&T post interventions today. May need to discharge from PT if not able to stabilize MS progression medically.   Neuromuscular Re-education   Neuromuscular re-ed of mvmt, balance, coord, kinesthetic sense,  posture, proprioception minutes (72854) 35   Neuro Re-ed 1 Static balance   Neuro Re-ed 1 - Details Focused on stabilizing balance on uneven surface while performing UE exercises. Started with standing on L2 angle board in DF EO x 1 min and EC x 1 min, then switched into L2 DF EO x 1 min and EC x 1 min. Next, standing on angle board L2 EO performing bicep curls, overhead lifts, lat shld abd and straight arm shld flex with 2# dumbbells 5x B, then switched angle board into PF on L2 and repeated. Repeated this sequence in DF and PF on L3. Finally, setup pt on angle board L2 with back to wall and chair on each side to correct LOB in case of fall, throwing kickball back and forth with therapist, progressed from straight throws to throwing overhead and at angles to increase trunk rot and core activation required for pt to maintain balance. Pt had LOB x1 post/R using chair on R to correct, falling back against wall 1x as well.   Patient Response/Progress Extra time for setup. Pt reporting N&T in RUE post interventions.   Plan   Home program PTRx, home cycling with recumbent bike   Plan for next session Continue to progress standing static balance challenges progressing off exercises above adding more dynamic challenge to maintain balance while using BUE   Comments   Comments Focusing on static balance and stepping reactions to reduce incidence of falls at home and in community. Pt reporting new RUE weakness and reporting N&T today post interventions. Will continue to monitor. Recommended pt see neurologist as she reports she may change her MS med regimen.   Total Session Time   Timed Code Treatment Minutes 43   Total Treatment Time (sum of timed and untimed services) 43         PLAN  Continue therapy per current plan of care. Continuing to monitor pt as her function regresses, offering different interventions to assist with managing weakness in RLE and balance deficits as they progress but pt having difficulty accepting  realities of her condition. Will continue to encourage external supports ie braces or wraps to help stabilize joints and reduce risk of falls.    Beginning/End Dates of Progress Note Reporting Period:   01/04/2024 to 11/07/2024    Referring Provider:  Darby Sandoval

## 2024-11-08 NOTE — TELEPHONE ENCOUNTER
Received refill request for dalfampridine from [facility]; Patient was last seen in June 2024 and has follow up appointment in Dec 2024 with Dr Sandoval. Refilled per MS refill protocol.    Vidya Weinberg RN

## 2024-11-11 ENCOUNTER — MYC MEDICAL ADVICE (OUTPATIENT)
Dept: NEUROLOGY | Facility: CLINIC | Age: 34
End: 2024-11-11

## 2024-11-11 ENCOUNTER — THERAPY VISIT (OUTPATIENT)
Dept: PHYSICAL THERAPY | Facility: CLINIC | Age: 34
End: 2024-11-11
Attending: PSYCHIATRY & NEUROLOGY
Payer: COMMERCIAL

## 2024-11-11 DIAGNOSIS — F40.240 CLAUSTROPHOBIA: Primary | ICD-10-CM

## 2024-11-11 DIAGNOSIS — G35 MS (MULTIPLE SCLEROSIS) (H): Primary | ICD-10-CM

## 2024-11-11 PROCEDURE — 97110 THERAPEUTIC EXERCISES: CPT | Mod: GP

## 2024-11-11 PROCEDURE — 97530 THERAPEUTIC ACTIVITIES: CPT | Mod: GP

## 2024-11-12 RX ORDER — DIAZEPAM 5 MG/1
TABLET ORAL
Qty: 2 TABLET | Refills: 0 | Status: SHIPPED | OUTPATIENT
Start: 2024-11-12

## 2024-11-12 NOTE — CONFIDENTIAL NOTE
Mary requesting medication for upcoming MRI.  Diazepam Rx pended to Dr. Sandoval.    Rochelle Putnam RN

## 2024-11-18 DIAGNOSIS — R26.81 GAIT INSTABILITY: ICD-10-CM

## 2024-11-18 DIAGNOSIS — N31.9 NEUROGENIC BLADDER: ICD-10-CM

## 2024-11-18 DIAGNOSIS — R25.2 SPASTICITY: ICD-10-CM

## 2024-11-18 DIAGNOSIS — G35 MS (MULTIPLE SCLEROSIS) (H): ICD-10-CM

## 2024-11-18 NOTE — TELEPHONE ENCOUNTER
Pending Prescriptions:                       Disp   Refills    tolterodine (DETROL) 2 MG tablet          30 tab*1            Sig: Take 1 tablet (2 mg) by mouth daily.       LOV: 11.7.24  NOV: 1.30.25    Annalee Angle MA

## 2024-11-19 RX ORDER — TOLTERODINE TARTRATE 2 MG/1
2 TABLET, EXTENDED RELEASE ORAL DAILY
Qty: 30 TABLET | Refills: 1 | Status: SHIPPED | OUTPATIENT
Start: 2024-11-19

## 2024-11-21 ENCOUNTER — HOSPITAL ENCOUNTER (OUTPATIENT)
Dept: MRI IMAGING | Facility: CLINIC | Age: 34
End: 2024-11-21
Attending: PSYCHIATRY & NEUROLOGY
Payer: COMMERCIAL

## 2024-11-21 RX ADMIN — GADOBUTROL 7.5 ML: 604.72 INJECTION INTRAVENOUS at 17:48

## 2024-12-10 ENCOUNTER — MYC MEDICAL ADVICE (OUTPATIENT)
Dept: NEUROLOGY | Facility: CLINIC | Age: 34
End: 2024-12-10
Payer: COMMERCIAL

## 2024-12-10 DIAGNOSIS — R25.2 SPASTICITY: ICD-10-CM

## 2024-12-10 DIAGNOSIS — R26.81 GAIT INSTABILITY: Primary | ICD-10-CM

## 2024-12-11 ENCOUNTER — LAB (OUTPATIENT)
Dept: LAB | Facility: CLINIC | Age: 34
End: 2024-12-11
Payer: COMMERCIAL

## 2024-12-11 DIAGNOSIS — R25.2 SPASTICITY: ICD-10-CM

## 2024-12-11 DIAGNOSIS — R26.81 GAIT INSTABILITY: ICD-10-CM

## 2024-12-11 PROCEDURE — 86341 ISLET CELL ANTIBODY: CPT | Mod: 90

## 2024-12-11 PROCEDURE — 99000 SPECIMEN HANDLING OFFICE-LAB: CPT

## 2024-12-11 PROCEDURE — 36415 COLL VENOUS BLD VENIPUNCTURE: CPT

## 2025-01-06 ENCOUNTER — DOCUMENTATION ONLY (OUTPATIENT)
Dept: NEUROLOGY | Facility: CLINIC | Age: 35
End: 2025-01-06
Payer: COMMERCIAL

## 2025-01-06 NOTE — PROGRESS NOTES
Orders for physical therapy have been received from [facility], orders placed in Dr. Sandoval's folder for review and signature.   Son Gonzalez EMT January 6, 2025

## 2025-01-25 ENCOUNTER — ANESTHESIA (OUTPATIENT)
Dept: SURGERY | Facility: CLINIC | Age: 35
End: 2025-01-25
Payer: COMMERCIAL

## 2025-01-25 ENCOUNTER — ANESTHESIA EVENT (OUTPATIENT)
Dept: SURGERY | Facility: CLINIC | Age: 35
End: 2025-01-25
Payer: COMMERCIAL

## 2025-01-25 ENCOUNTER — HOSPITAL ENCOUNTER (OUTPATIENT)
Facility: CLINIC | Age: 35
Setting detail: OBSERVATION
Discharge: HOME | End: 2025-01-26
Attending: EMERGENCY MEDICINE | Admitting: OBSTETRICS & GYNECOLOGY
Payer: COMMERCIAL

## 2025-01-25 DIAGNOSIS — D84.9 IMMUNOSUPPRESSED STATUS: ICD-10-CM

## 2025-01-25 DIAGNOSIS — B00.1 COLD SORE: ICD-10-CM

## 2025-01-25 DIAGNOSIS — R10.32 LLQ PAIN: Primary | ICD-10-CM

## 2025-01-25 DIAGNOSIS — N83.512 TORSION OF LEFT OVARY: ICD-10-CM

## 2025-01-25 PROCEDURE — 250N000011 HC RX IP 250 OP 636: Performed by: OBSTETRICS & GYNECOLOGY

## 2025-01-25 PROCEDURE — 96376 TX/PRO/DX INJ SAME DRUG ADON: CPT

## 2025-01-25 PROCEDURE — 360N000077 HC SURGERY LEVEL 4, PER MIN: Performed by: OBSTETRICS & GYNECOLOGY

## 2025-01-25 PROCEDURE — 258N000003 HC RX IP 258 OP 636: Performed by: NURSE ANESTHETIST, CERTIFIED REGISTERED

## 2025-01-25 PROCEDURE — 710N000012 HC RECOVERY PHASE 2, PER MINUTE: Performed by: OBSTETRICS & GYNECOLOGY

## 2025-01-25 PROCEDURE — 250N000011 HC RX IP 250 OP 636: Mod: JZ | Performed by: EMERGENCY MEDICINE

## 2025-01-25 PROCEDURE — 96374 THER/PROPH/DIAG INJ IV PUSH: CPT

## 2025-01-25 PROCEDURE — 88305 TISSUE EXAM BY PATHOLOGIST: CPT | Mod: 26 | Performed by: PATHOLOGY

## 2025-01-25 PROCEDURE — 710N000010 HC RECOVERY PHASE 1, LEVEL 2, PER MIN: Performed by: OBSTETRICS & GYNECOLOGY

## 2025-01-25 PROCEDURE — G0378 HOSPITAL OBSERVATION PER HR: HCPCS

## 2025-01-25 PROCEDURE — 99285 EMERGENCY DEPT VISIT HI MDM: CPT | Mod: 25

## 2025-01-25 PROCEDURE — 96375 TX/PRO/DX INJ NEW DRUG ADDON: CPT

## 2025-01-25 PROCEDURE — 250N000011 HC RX IP 250 OP 636: Performed by: ANESTHESIOLOGY

## 2025-01-25 PROCEDURE — 370N000017 HC ANESTHESIA TECHNICAL FEE, PER MIN: Performed by: OBSTETRICS & GYNECOLOGY

## 2025-01-25 PROCEDURE — 250N000011 HC RX IP 250 OP 636: Performed by: NURSE ANESTHETIST, CERTIFIED REGISTERED

## 2025-01-25 PROCEDURE — 250N000025 HC SEVOFLURANE, PER MIN: Performed by: OBSTETRICS & GYNECOLOGY

## 2025-01-25 PROCEDURE — 272N000001 HC OR GENERAL SUPPLY STERILE: Performed by: OBSTETRICS & GYNECOLOGY

## 2025-01-25 PROCEDURE — 88305 TISSUE EXAM BY PATHOLOGIST: CPT | Mod: TC | Performed by: OBSTETRICS & GYNECOLOGY

## 2025-01-25 PROCEDURE — 250N000009 HC RX 250: Performed by: NURSE ANESTHETIST, CERTIFIED REGISTERED

## 2025-01-25 PROCEDURE — 258N000003 HC RX IP 258 OP 636: Performed by: EMERGENCY MEDICINE

## 2025-01-25 RX ORDER — PROCHLORPERAZINE MALEATE 10 MG
10 TABLET ORAL EVERY 6 HOURS PRN
Status: DISCONTINUED | OUTPATIENT
Start: 2025-01-25 | End: 2025-01-26 | Stop reason: HOSPADM

## 2025-01-25 RX ORDER — GLYCOPYRROLATE 0.2 MG/ML
INJECTION, SOLUTION INTRAMUSCULAR; INTRAVENOUS PRN
Status: DISCONTINUED | OUTPATIENT
Start: 2025-01-25 | End: 2025-01-25

## 2025-01-25 RX ORDER — LIDOCAINE HYDROCHLORIDE 10 MG/ML
INJECTION, SOLUTION INFILTRATION; PERINEURAL PRN
Status: DISCONTINUED | OUTPATIENT
Start: 2025-01-25 | End: 2025-01-25

## 2025-01-25 RX ORDER — ONDANSETRON 2 MG/ML
INJECTION INTRAMUSCULAR; INTRAVENOUS PRN
Status: DISCONTINUED | OUTPATIENT
Start: 2025-01-25 | End: 2025-01-25

## 2025-01-25 RX ORDER — OXYCODONE HYDROCHLORIDE 5 MG/1
5 TABLET ORAL
Status: DISCONTINUED | OUTPATIENT
Start: 2025-01-25 | End: 2025-01-26 | Stop reason: HOSPADM

## 2025-01-25 RX ORDER — ONDANSETRON 2 MG/ML
4 INJECTION INTRAMUSCULAR; INTRAVENOUS EVERY 30 MIN PRN
Status: DISCONTINUED | OUTPATIENT
Start: 2025-01-25 | End: 2025-01-25 | Stop reason: HOSPADM

## 2025-01-25 RX ORDER — AMOXICILLIN 250 MG
1 CAPSULE ORAL 2 TIMES DAILY
Status: DISCONTINUED | OUTPATIENT
Start: 2025-01-25 | End: 2025-01-26 | Stop reason: HOSPADM

## 2025-01-25 RX ORDER — ONDANSETRON 4 MG/1
4 TABLET, ORALLY DISINTEGRATING ORAL EVERY 6 HOURS PRN
Status: DISCONTINUED | OUTPATIENT
Start: 2025-01-25 | End: 2025-01-26 | Stop reason: HOSPADM

## 2025-01-25 RX ORDER — LIDOCAINE 40 MG/G
CREAM TOPICAL
Status: CANCELLED | OUTPATIENT
Start: 2025-01-25

## 2025-01-25 RX ORDER — DEXAMETHASONE SODIUM PHOSPHATE 4 MG/ML
4 INJECTION, SOLUTION INTRA-ARTICULAR; INTRALESIONAL; INTRAMUSCULAR; INTRAVENOUS; SOFT TISSUE
Status: DISCONTINUED | OUTPATIENT
Start: 2025-01-25 | End: 2025-01-25 | Stop reason: HOSPADM

## 2025-01-25 RX ORDER — GABAPENTIN 100 MG/1
100 CAPSULE ORAL 3 TIMES DAILY
Status: DISCONTINUED | OUTPATIENT
Start: 2025-01-25 | End: 2025-01-26 | Stop reason: HOSPADM

## 2025-01-25 RX ORDER — ACETAMINOPHEN 325 MG/1
650 TABLET ORAL EVERY 4 HOURS PRN
Status: DISCONTINUED | OUTPATIENT
Start: 2025-01-25 | End: 2025-01-26 | Stop reason: HOSPADM

## 2025-01-25 RX ORDER — AMOXICILLIN 250 MG
1-2 CAPSULE ORAL 2 TIMES DAILY PRN
Qty: 30 TABLET | Refills: 0 | Status: SHIPPED | OUTPATIENT
Start: 2025-01-25

## 2025-01-25 RX ORDER — ONDANSETRON 4 MG/1
4 TABLET, ORALLY DISINTEGRATING ORAL EVERY 30 MIN PRN
Status: DISCONTINUED | OUTPATIENT
Start: 2025-01-25 | End: 2025-01-25 | Stop reason: HOSPADM

## 2025-01-25 RX ORDER — NALOXONE HYDROCHLORIDE 0.4 MG/ML
0.1 INJECTION, SOLUTION INTRAMUSCULAR; INTRAVENOUS; SUBCUTANEOUS
Status: DISCONTINUED | OUTPATIENT
Start: 2025-01-25 | End: 2025-01-25 | Stop reason: HOSPADM

## 2025-01-25 RX ORDER — MORPHINE SULFATE 4 MG/ML
4 INJECTION, SOLUTION INTRAMUSCULAR; INTRAVENOUS
Status: DISCONTINUED | OUTPATIENT
Start: 2025-01-25 | End: 2025-01-26 | Stop reason: HOSPADM

## 2025-01-25 RX ORDER — SODIUM CHLORIDE, SODIUM LACTATE, POTASSIUM CHLORIDE, CALCIUM CHLORIDE 600; 310; 30; 20 MG/100ML; MG/100ML; MG/100ML; MG/100ML
INJECTION, SOLUTION INTRAVENOUS CONTINUOUS
Status: CANCELLED | OUTPATIENT
Start: 2025-01-25

## 2025-01-25 RX ORDER — FENTANYL CITRATE 50 UG/ML
INJECTION, SOLUTION INTRAMUSCULAR; INTRAVENOUS PRN
Status: DISCONTINUED | OUTPATIENT
Start: 2025-01-25 | End: 2025-01-25

## 2025-01-25 RX ORDER — FENTANYL CITRATE 50 UG/ML
25 INJECTION, SOLUTION INTRAMUSCULAR; INTRAVENOUS EVERY 5 MIN PRN
Status: DISCONTINUED | OUTPATIENT
Start: 2025-01-25 | End: 2025-01-25 | Stop reason: HOSPADM

## 2025-01-25 RX ORDER — ONDANSETRON 2 MG/ML
4 INJECTION INTRAMUSCULAR; INTRAVENOUS ONCE
Status: COMPLETED | OUTPATIENT
Start: 2025-01-25 | End: 2025-01-25

## 2025-01-25 RX ORDER — KETOROLAC TROMETHAMINE 30 MG/ML
INJECTION, SOLUTION INTRAMUSCULAR; INTRAVENOUS PRN
Status: DISCONTINUED | OUTPATIENT
Start: 2025-01-25 | End: 2025-01-25

## 2025-01-25 RX ORDER — LIDOCAINE 40 MG/G
CREAM TOPICAL
Status: DISCONTINUED | OUTPATIENT
Start: 2025-01-25 | End: 2025-01-26 | Stop reason: HOSPADM

## 2025-01-25 RX ORDER — SODIUM CHLORIDE 9 MG/ML
INJECTION, SOLUTION INTRAVENOUS CONTINUOUS
Status: DISCONTINUED | OUTPATIENT
Start: 2025-01-25 | End: 2025-01-26 | Stop reason: HOSPADM

## 2025-01-25 RX ORDER — ACETAMINOPHEN 325 MG/1
975 TABLET ORAL ONCE
Status: DISCONTINUED | OUTPATIENT
Start: 2025-01-26 | End: 2025-01-26 | Stop reason: HOSPADM

## 2025-01-25 RX ORDER — BUPIVACAINE HYDROCHLORIDE 2.5 MG/ML
INJECTION, SOLUTION INFILTRATION; PERINEURAL PRN
Status: DISCONTINUED | OUTPATIENT
Start: 2025-01-25 | End: 2025-01-25 | Stop reason: HOSPADM

## 2025-01-25 RX ORDER — AMOXICILLIN 250 MG
2 CAPSULE ORAL 2 TIMES DAILY PRN
Status: DISCONTINUED | OUTPATIENT
Start: 2025-01-25 | End: 2025-01-26 | Stop reason: HOSPADM

## 2025-01-25 RX ORDER — SODIUM CHLORIDE, SODIUM LACTATE, POTASSIUM CHLORIDE, CALCIUM CHLORIDE 600; 310; 30; 20 MG/100ML; MG/100ML; MG/100ML; MG/100ML
INJECTION, SOLUTION INTRAVENOUS CONTINUOUS PRN
Status: DISCONTINUED | OUTPATIENT
Start: 2025-01-25 | End: 2025-01-25

## 2025-01-25 RX ORDER — PROPOFOL 10 MG/ML
INJECTION, EMULSION INTRAVENOUS PRN
Status: DISCONTINUED | OUTPATIENT
Start: 2025-01-25 | End: 2025-01-25

## 2025-01-25 RX ORDER — OXYCODONE HYDROCHLORIDE 5 MG/1
5-10 TABLET ORAL EVERY 4 HOURS PRN
Qty: 12 TABLET | Refills: 0 | Status: SHIPPED | OUTPATIENT
Start: 2025-01-25 | End: 2025-01-25

## 2025-01-25 RX ORDER — OXYCODONE HYDROCHLORIDE 5 MG/1
5-10 TABLET ORAL EVERY 4 HOURS PRN
Qty: 12 TABLET | Refills: 0 | Status: SHIPPED | OUTPATIENT
Start: 2025-01-25

## 2025-01-25 RX ORDER — ACETAMINOPHEN 650 MG/1
650 SUPPOSITORY RECTAL EVERY 4 HOURS PRN
Status: DISCONTINUED | OUTPATIENT
Start: 2025-01-25 | End: 2025-01-26 | Stop reason: HOSPADM

## 2025-01-25 RX ORDER — ONDANSETRON 2 MG/ML
4 INJECTION INTRAMUSCULAR; INTRAVENOUS EVERY 6 HOURS PRN
Status: DISCONTINUED | OUTPATIENT
Start: 2025-01-25 | End: 2025-01-26 | Stop reason: HOSPADM

## 2025-01-25 RX ORDER — HYDROMORPHONE HCL IN WATER/PF 6 MG/30 ML
0.4 PATIENT CONTROLLED ANALGESIA SYRINGE INTRAVENOUS
Status: DISCONTINUED | OUTPATIENT
Start: 2025-01-25 | End: 2025-01-26 | Stop reason: HOSPADM

## 2025-01-25 RX ORDER — SODIUM CHLORIDE, SODIUM LACTATE, POTASSIUM CHLORIDE, CALCIUM CHLORIDE 600; 310; 30; 20 MG/100ML; MG/100ML; MG/100ML; MG/100ML
INJECTION, SOLUTION INTRAVENOUS CONTINUOUS
Status: DISCONTINUED | OUTPATIENT
Start: 2025-01-25 | End: 2025-01-25 | Stop reason: HOSPADM

## 2025-01-25 RX ORDER — ACETAMINOPHEN 325 MG/1
975 TABLET ORAL ONCE
Status: CANCELLED | OUTPATIENT
Start: 2025-01-25 | End: 2025-01-25

## 2025-01-25 RX ORDER — AMOXICILLIN 250 MG
2 CAPSULE ORAL 2 TIMES DAILY
Status: DISCONTINUED | OUTPATIENT
Start: 2025-01-25 | End: 2025-01-26 | Stop reason: HOSPADM

## 2025-01-25 RX ORDER — AMOXICILLIN 250 MG
1 CAPSULE ORAL 2 TIMES DAILY PRN
Status: DISCONTINUED | OUTPATIENT
Start: 2025-01-25 | End: 2025-01-26 | Stop reason: HOSPADM

## 2025-01-25 RX ORDER — HYDROMORPHONE HCL IN WATER/PF 6 MG/30 ML
0.2 PATIENT CONTROLLED ANALGESIA SYRINGE INTRAVENOUS EVERY 5 MIN PRN
Status: DISCONTINUED | OUTPATIENT
Start: 2025-01-25 | End: 2025-01-25 | Stop reason: HOSPADM

## 2025-01-25 RX ORDER — DEXAMETHASONE SODIUM PHOSPHATE 10 MG/ML
INJECTION, SOLUTION INTRAMUSCULAR; INTRAVENOUS PRN
Status: DISCONTINUED | OUTPATIENT
Start: 2025-01-25 | End: 2025-01-25

## 2025-01-25 RX ORDER — HYDROMORPHONE HYDROCHLORIDE 2 MG/1
2 TABLET ORAL EVERY 4 HOURS PRN
Status: DISCONTINUED | OUTPATIENT
Start: 2025-01-25 | End: 2025-01-26 | Stop reason: HOSPADM

## 2025-01-25 RX ORDER — FENTANYL CITRATE 50 UG/ML
50 INJECTION, SOLUTION INTRAMUSCULAR; INTRAVENOUS EVERY 5 MIN PRN
Status: DISCONTINUED | OUTPATIENT
Start: 2025-01-25 | End: 2025-01-25 | Stop reason: HOSPADM

## 2025-01-25 RX ORDER — HYDROMORPHONE HCL IN WATER/PF 6 MG/30 ML
0.4 PATIENT CONTROLLED ANALGESIA SYRINGE INTRAVENOUS EVERY 5 MIN PRN
Status: DISCONTINUED | OUTPATIENT
Start: 2025-01-25 | End: 2025-01-25 | Stop reason: HOSPADM

## 2025-01-25 RX ORDER — IBUPROFEN 400 MG/1
800 TABLET, FILM COATED ORAL ONCE
Status: DISCONTINUED | OUTPATIENT
Start: 2025-01-26 | End: 2025-01-26 | Stop reason: HOSPADM

## 2025-01-25 RX ADMIN — FENTANYL CITRATE 100 MCG: 50 INJECTION INTRAMUSCULAR; INTRAVENOUS at 21:42

## 2025-01-25 RX ADMIN — ROCURONIUM BROMIDE 50 MG: 10 INJECTION, SOLUTION INTRAVENOUS at 21:42

## 2025-01-25 RX ADMIN — ONDANSETRON 4 MG: 4 TABLET, ORALLY DISINTEGRATING ORAL at 23:20

## 2025-01-25 RX ADMIN — GLYCOPYRROLATE 0.2 MG: 0.2 INJECTION INTRAMUSCULAR; INTRAVENOUS at 21:42

## 2025-01-25 RX ADMIN — DEXAMETHASONE SODIUM PHOSPHATE 10 MG: 10 INJECTION, SOLUTION INTRAMUSCULAR; INTRAVENOUS at 21:42

## 2025-01-25 RX ADMIN — ONDANSETRON 4 MG: 2 INJECTION, SOLUTION INTRAMUSCULAR; INTRAVENOUS at 17:12

## 2025-01-25 RX ADMIN — LIDOCAINE HYDROCHLORIDE 50 MG: 10 INJECTION, SOLUTION INFILTRATION; PERINEURAL at 21:42

## 2025-01-25 RX ADMIN — MORPHINE SULFATE 4 MG: 4 INJECTION, SOLUTION INTRAMUSCULAR; INTRAVENOUS at 17:13

## 2025-01-25 RX ADMIN — KETOROLAC TROMETHAMINE 30 MG: 30 INJECTION, SOLUTION INTRAMUSCULAR at 22:34

## 2025-01-25 RX ADMIN — SODIUM CHLORIDE, POTASSIUM CHLORIDE, SODIUM LACTATE AND CALCIUM CHLORIDE: 600; 310; 30; 20 INJECTION, SOLUTION INTRAVENOUS at 21:38

## 2025-01-25 RX ADMIN — HYDROMORPHONE HYDROCHLORIDE 1 MG: 1 INJECTION, SOLUTION INTRAMUSCULAR; INTRAVENOUS; SUBCUTANEOUS at 21:42

## 2025-01-25 RX ADMIN — SODIUM CHLORIDE: 9 INJECTION, SOLUTION INTRAVENOUS at 17:11

## 2025-01-25 RX ADMIN — MORPHINE SULFATE 4 MG: 4 INJECTION, SOLUTION INTRAMUSCULAR; INTRAVENOUS at 20:13

## 2025-01-25 RX ADMIN — ONDANSETRON 4 MG: 2 INJECTION INTRAMUSCULAR; INTRAVENOUS at 21:42

## 2025-01-25 RX ADMIN — PROPOFOL 200 MG: 10 INJECTION, EMULSION INTRAVENOUS at 21:42

## 2025-01-25 ASSESSMENT — ACTIVITIES OF DAILY LIVING (ADL)
ADLS_ACUITY_SCORE: 41
ADLS_ACUITY_SCORE: 42

## 2025-01-25 ASSESSMENT — COLUMBIA-SUICIDE SEVERITY RATING SCALE - C-SSRS
1. IN THE PAST MONTH, HAVE YOU WISHED YOU WERE DEAD OR WISHED YOU COULD GO TO SLEEP AND NOT WAKE UP?: NO
2. HAVE YOU ACTUALLY HAD ANY THOUGHTS OF KILLING YOURSELF IN THE PAST MONTH?: NO
6. HAVE YOU EVER DONE ANYTHING, STARTED TO DO ANYTHING, OR PREPARED TO DO ANYTHING TO END YOUR LIFE?: NO

## 2025-01-25 NOTE — PHARMACY-ADMISSION MEDICATION HISTORY
Pharmacist Admission Medication History    Admission medication history is complete. The information provided in this note is only as accurate as the sources available at the time of the update.    Information Source(s): Patient and CareEverywhere/SureScripts via in-person    Pertinent Information: Patient had been started on 7 days of Keflex took yesterday, but no longer taking. Provider and patient thought antibiotics not necessary.     Changes made to PTA medication list:  Added: None  Deleted: lurasidone, methylphenidate.  Changed: None    Allergies reviewed with patient and updates made in EHR: yes    Medication History Completed By: Del Stewart RPH 1/25/2025 5:08 PM    Current Facility-Administered Medications for the 1/25/25 encounter (Hospital Encounter)   Medication    botulinum toxin type A (BOTOX) 100 units injection 300 Units     PTA Med List   Medication Sig Last Dose/Taking    dalfampridine (AMPYRA) 10 MG TB12 12 hr tablet Take 1 tablet (10 mg) by mouth 2 times daily. 1/24/2025 Evening    tolterodine (DETROL) 2 MG tablet Take 1 tablet (2 mg) by mouth daily. 1/24/2025 Morning    valACYclovir (VALTREX) 500 MG tablet Take 1 tablet (500 mg) by mouth daily (Patient taking differently: Take 500 mg by mouth daily as needed (cold sores).) Past Month    venlafaxine (EFFEXOR-ER) 225 MG 24 hr tablet Take 225 mg by mouth daily 1/24/2025 Morning

## 2025-01-25 NOTE — ED TRIAGE NOTES
Patient presents to ED with diagnosis of L ovarian torsion PTA at the [facility], patient woke up this morning with L sided abdominal pain, no food today, last drink of water @0800 this morning.  Ifeoma Lilly RN.......1/25/2025 4:06 PM     Triage Assessment (Adult)       Row Name 01/25/25 2628          Triage Assessment    Airway WDL WDL        Respiratory WDL    Respiratory WDL WDL        Skin Circulation/Temperature WDL    Skin Circulation/Temperature WDL WDL        Cardiac WDL    Cardiac WDL WDL        Peripheral/Neurovascular WDL    Peripheral Neurovascular WDL WDL        Cognitive/Neuro/Behavioral WDL    Cognitive/Neuro/Behavioral WDL WDL

## 2025-01-25 NOTE — ED NOTES
Expected Patient Referral to ED  3:47 PM    Referring Clinic/Provider:  [facility]    Reason for referral/Clinical facts:  Patient with diagnosis ovarian torsion.  To be seen by [facility] on arrival.  N.p.o.    Recommendations provided:  Send to ED for further evaluation    Caller was informed that this institution does possess the capabilities and/or resources to provide for patient and should be transferred to our facility.    Discussed that if direct admit is sought and any hurdles are encountered, this ED would be happy to see the patient and evaluate.    Informed caller that recommendations provided are recommendations based only on the facts provided and that they responsible to accept or reject the advice, or to seek a formal in person consultation as needed and that this ED will see/treat patient should they arrive.      Rajan Shaikh MD  [facility] EMERGENCY ROOM  [address] [id number]  [phone]       Rajan Shaikh MD  01/25/25 8888

## 2025-01-25 NOTE — ED PROVIDER NOTES
EMERGENCY DEPARTMENT ENCOUNTER      NAME: Mary Evans  AGE: 34 year old female  YOB: 1990  MRN: [id number]  EVALUATION DATE & TIME: No admission date for patient encounter.    PCP: Shraddha Tucker    ED PROVIDER: Rajan Shaikh M.D.      Chief Complaint   Patient presents with    Abdominal Pain         FINAL IMPRESSION:  Left ovarian torsion  Prior hysterectomy    ED COURSE & MEDICAL DECISION MAKING:    Pertinent Labs & Imaging studies reviewed. (See chart for details)  34 year old female presents to the Emergency Department for admission for left ovarian torsion.  Patient evaluated at local emergency room.  Laboratory evaluation essentially unremarkable.  Ultrasound with markedly diminished flow in the left ovary.  Minimal venous flow noted no arterial flow.  Patient discussed with Dr. Bañuelos of OB/GYN.  She recommended patient transfer to [facility] for hospitalization and pain management.  Patient with potential delayed oophorectomy tomorrow.  Exam reveals well-nourished old female mild to moderate distress.  Marked right lower quadrant tenderness.  Does report some nausea but no vomiting no diarrhea.  No urinary symptoms.  Will plan on admission with patient to be n.p.o. at midnight in anticipation of surgery.  Intravenous morphine initiated for discomfort.  Zofran initiated for nausea vomiting.. Patient appears non toxic with stable vitals signs. Overall exam is benign.      4:12 PM.  Patient discussed with the OB/GYN on-call  who recommends admitting the patient for pain control.  Given hysterectomy no urgent need for surgical intervention  4:35 PM to I met with the patient for the initial interview and physical examination. Discussed plan for treatment and workup in the ED.      At the conclusion of the encounter I discussed the results of all of the tests and the disposition. The questions were answered and return precautions provided. The patient or family acknowledged  "understanding and was agreeable with the care plan.     MEDICATIONS GIVEN IN THE EMERGENCY:  Medications - No data to display    NEW PRESCRIPTIONS STARTED AT TODAY'S ER VISIT  New Prescriptions    No medications on file          =================================================================    HPI          Mary Evans is a 34 year old female with a pertient medical history of MS who presents to the ED for evaluation of abdominal pain.  Patient reports fairly sudden onset of left lower quadrant discomfort early this morning with gradual worsening.  Presented to emergency room where she was diagnosed with left ovarian torsion.  Situation discussed with on-call OB/GYN who recommends transfer here for hospitalization and pain management.  Likely deferred surgical intervention    Reviewed 25 visit to [facility] for evaluation of LLQ abdominal pain. US pelvis resulted: \"The left ovary is mildly enlarged and contains a few small follicles. No arterial blood flow is detected and there is only faint venous flow noted. Based on these findings, ovarian torsion cannot be excluded. Gynecologic consultation is recommended\". Patient given Tylenol, Zofran, Toradol, and referred to ED.  REVIEW OF SYSTEMS   All systems negative except as noted    PAST MEDICAL HISTORY:  Past Medical History:   Diagnosis Date    Multiple sclerosis (H)        PAST SURGICAL HISTORY:  Past Surgical History:   Procedure Laterality Date    C/SECTION, LOW TRANSVERSE  2016    breech / dr tala moreno / [facility]      SECTION      DILATION AND CURETTAGE      HYSTERECTOMY, TOTAL, ROBOT-ASSISTED, USING DA AMADO XI, WITH SALPINGECTOMY Bilateral 10/31/2022    Procedure: ROBOTIC TOTAL LAPAROSCOPIC HYSTERECTOMY, BILATERAL SALPINGECTOMY, CYSTOSCOPY;  Surgeon: Tala Moreno DO;  Location: [facility] Main OR    OTHER SURGICAL HISTORY      uterine septum revision         CURRENT MEDICATIONS:      Current Facility-Administered " "Medications:     botulinum toxin type A (BOTOX) 100 units injection 300 Units, 300 Units, Intramuscular, Q90 Days, , 400 Units at 11/07/24 1613    Current Outpatient Medications:     dalfampridine (AMPYRA) 10 MG TB12 12 hr tablet, Take 1 tablet (10 mg) by mouth 2 times daily., Disp: 60 tablet, Rfl: 11    diazepam (VALIUM) 5 MG tablet, Take one tablet 30 minutes prior to MRI. Then take one tablet at time of MRI if needed. Must have ., Disp: 2 tablet, Rfl: 0    lurasidone (LATUDA) 20 MG TABS tablet, Take 20 mg by mouth daily. Titrating up to 60mg every day after month at 20mg every day, Disp: , Rfl:     methylphenidate (RITALIN) 20 MG tablet, Take 1 tablet (20 mg) by mouth 3 times daily. (Patient taking differently: Take 60 mg by mouth daily.), Disp: 90 tablet, Rfl: 0    tolterodine (DETROL) 2 MG tablet, Take 1 tablet (2 mg) by mouth daily., Disp: 30 tablet, Rfl: 1    valACYclovir (VALTREX) 500 MG tablet, Take 1 tablet (500 mg) by mouth daily (Patient taking differently: Take 500 mg by mouth daily as needed (cold sores).), Disp: 90 tablet, Rfl: 3    venlafaxine (EFFEXOR-ER) 225 MG 24 hr tablet, Take 225 mg by mouth daily, Disp: , Rfl:     ALLERGIES:  No Known Allergies    FAMILY HISTORY:  Family History   Problem Relation Age of Onset    Urolithiasis Mother     Cancer Maternal Grandfather         lung    Cancer Paternal Grandfather         lung       SOCIAL HISTORY:   Social History     Socioeconomic History    Marital status: Single   Tobacco Use    Smoking status: Never   Substance and Sexual Activity    Alcohol use: No    Drug use: No    Sexual activity: Yes     Partners: Male       VITALS:  Patient Vitals for the past 24 hrs:   BP Temp Temp src Pulse Resp SpO2 Height Weight   01/25/25 1603 131/87 97.6  F (36.4  C) Temporal 86 20 99 % 1.727 m (5' 8\") 79.4 kg (175 lb)        PHYSICAL EXAM    Constitutional:  Awake, alert, in mild to moderate apparent distress  HENT:  Normocephalic, Atraumatic. Bilateral " external ears normal. Oropharynx moist. Nose normal. Neck- Normal range of motion with no guarding, No midline cervical tenderness, Supple, No stridor.   Eyes:  PERRL, EOMI with no signs of entrapment, Conjunctiva normal, No discharge.   Respiratory:  Normal breath sounds, No respiratory distress, No wheezing.    Cardiovascular:  Normal heart rate, Normal rhythm, No appreciable rubs or gallops.   GI:  Soft, moderate left lower quadrant tenderness tenderness, No distension, No palpable masses  Musculoskeletal:   No edema. Good range of motion in all major joints. No tenderness to palpation or major deformities noted.  Integument:  Warm, Dry, No erythema, No rash.   Neurologic:  Alert & oriented, Normal motor function, Normal sensory function, No focal deficits noted.   Psychiatric:  Affect normal, Judgment normal, Mood normal.     LAB:  All pertinent labs reviewed and interpreted.   Please refer to outpatient labs and ultrasound obtained at the emergency room earlier today        I, Debra Cid, am serving as a scribe to document services personally performed by Rajan Shaikh MD, based on my observation and the provider's statements to me. I, Rajan Shaikh MD attest that Debra Cid is acting in a scribe capacity, has observed my performance of the services and has documented them in accordance with my direction.    Rajan Shaikh M.D.  Emergency Medicine  [facility] EMERGENCY ROOM     Rajan Shaikh MD  01/25/25 6654

## 2025-01-25 NOTE — H&P
CONSULTATION - GYN    NAME: Mary Evans   : 1990   MRN: [id number]      ADMISSION DATE: 2025    PCP:  Shraddha Tucker have been requested by Dr. Shaikh to evaluate Mary Evans for LLQ pain, possible ovarian torsion.     CHIEF COMPLAINT: LLQ pain    HPI:  Mary Evans is a 34 year old who presented to the Urgency Room with LLQ pain, she was found to have possible left ovarian torsion and was referred to the ER. History is obtained through the patient and chart review. Patient had a robotic hysterectomy with salpingectomy in  for heavy menstrual bleeding. She had sudden LLQ pain since this morning.       PAST MEDICAL HISTORY:  Past Medical History:   Diagnosis Date    Multiple sclerosis (H)        PAST SURGICAL HISTORY:  Past Surgical History:   Procedure Laterality Date    C/SECTION, LOW TRANSVERSE  2016    breech / dr tala moreno / [facility]      SECTION      DILATION AND CURETTAGE      HYSTERECTOMY, TOTAL, ROBOT-ASSISTED, USING DA AMADO XI, WITH SALPINGECTOMY Bilateral 10/31/2022    Procedure: ROBOTIC TOTAL LAPAROSCOPIC HYSTERECTOMY, BILATERAL SALPINGECTOMY, CYSTOSCOPY;  Surgeon: Tala Moreno DO;  Location: [facility] Main OR    OTHER SURGICAL HISTORY      uterine septum revision       SOCIAL HISTORY:  Social History     Socioeconomic History    Marital status: Single     Spouse name: Not on file    Number of children: Not on file    Years of education: Not on file    Highest education level: Not on file   Occupational History    Not on file   Tobacco Use    Smoking status: Never    Smokeless tobacco: Not on file   Substance and Sexual Activity    Alcohol use: No    Drug use: No    Sexual activity: Yes     Partners: Male   Other Topics Concern    Not on file   Social History Narrative    Not on file     Social Drivers of Health     Financial Resource Strain: Not on file   Food Insecurity: Not on file   Transportation Needs: Not on file  "  Physical Activity: Not on file   Stress: Not on file   Social Connections: Not on file   Interpersonal Safety: Not on file   Housing Stability: Not on file       MEDICATIONS:  Current Facility-Administered Medications   Medication Dose Route Frequency Provider Last Rate Last Admin    botulinum toxin type A (BOTOX) 100 units injection 300 Units  300 Units Intramuscular Q90 Days    400 Units at 11/07/24 1613     Current Outpatient Medications   Medication Sig Dispense Refill    dalfampridine (AMPYRA) 10 MG TB12 12 hr tablet Take 1 tablet (10 mg) by mouth 2 times daily. 60 tablet 11    lurasidone (LATUDA) 20 MG TABS tablet Take 20 mg by mouth daily. Titrating up to 60mg every day after month at 20mg every day      methylphenidate (RITALIN) 20 MG tablet Take 1 tablet (20 mg) by mouth 3 times daily. (Patient taking differently: Take 60 mg by mouth daily.) 90 tablet 0    tolterodine (DETROL) 2 MG tablet Take 1 tablet (2 mg) by mouth daily. 30 tablet 1    valACYclovir (VALTREX) 500 MG tablet Take 1 tablet (500 mg) by mouth daily (Patient taking differently: Take 500 mg by mouth daily as needed (cold sores).) 90 tablet 3    venlafaxine (EFFEXOR-ER) 225 MG 24 hr tablet Take 225 mg by mouth daily         ALLERGIES:  No Known Allergies    REVIEW OF SYSTEMS    Negative except what is stated in the HPI    PHYSICAL EXAM:  /87   Pulse 86   Temp 97.6  F (36.4  C) (Temporal)   Resp 20   Ht 1.727 m (5' 8\")   Wt 79.4 kg (175 lb)   SpO2 99%   BMI 26.61 kg/m     General Appearance: Alert, appropriate appearance for age. No acute distress  HEENT : Grossly normal  Chest/Respiratory: Normal chest wall and respirations.   Cardiovascular: Regular rate and rhythm   Gastrointestinal: Mild LLQ tenderness (treated with morphine)  Pelvic Exam Female:  deferred  Musculoskeletal Exam: no LE edema  Psychiatric: Alert and oriented, appropriate affect.    LABS    Lab Results: personally reviewed in [facility] from Urgency room, normal " CBC.    IMAGING:  EXAM: ULTRASOUND PELVIS COMPLETE TA AND TV WITH DUPLEX   LOCATION: THE URGENCY ROOM [facility]   DATE: 2025     INDICATION: Pain/tenderness.   COMPARISON: CT 10/13/2016.   TECHNIQUE: Transabdominal scans were performed. Endovaginal ultrasound was performed to better visualize the adnexa. Color flow with spectral Doppler and waveform analysis performed.     FINDINGS:     UTERUS: Surgically absent.     RIGHT OVARY: 3.2 x 1.9 x 2.0 cm. Normal with arterial and venous duplex flow identified. Small follicles noted.     LEFT OVARY: 4.2 x 2.8 x 3.1 cm. No arterial blood flow is detected. There is very weak venous flow visualized. There are a few small follicles noted. Based on this finding, ovarian torsion cannot be excluded.     Trace free fluid noted left adnexal region.     Radiology Results: Personally reviewed impression/s    IMPRESSION:  34 year old  Female,  with s/p hysterectomy who presents with LLQ pain and possible left ovarian torsion. Will admit and manage pain while arranging for surgery. Discussed laparoscopic left oophorectomy vs attempt at retention of the ovary, will proceed with left oophorectomy. Can discharge after surgery, discussed recovery and lifting restrictions. Informed consent obtained for surgery and blood transfusion if needed (unlikely).    Thank you for allowing us to participate in the care of this patient.  Please contact us with any questions/concerns.    Val Bañuelos MD     CC: Shraddha Tucker, Tala Moreno

## 2025-01-26 VITALS
OXYGEN SATURATION: 98 % | SYSTOLIC BLOOD PRESSURE: 129 MMHG | HEART RATE: 86 BPM | WEIGHT: 175 LBS | DIASTOLIC BLOOD PRESSURE: 86 MMHG | BODY MASS INDEX: 26.52 KG/M2 | HEIGHT: 68 IN | TEMPERATURE: 97.1 F | RESPIRATION RATE: 17 BRPM

## 2025-01-26 NOTE — OP NOTE
Operative note    Preoperative diagnosis: Left ovarian torsion, LLQ pain  Postoperative diagnosis: Same    Procedure: Laparoscopic left oophorectomy    Surgeon: Ani Bañuelos MD     Anesthesia: General  Complications: None  EBL: 5  Specimens: Left ovary    Findings: Surgically absent uterus, left ovary torsed.    Indications: Patient is a 34 year old who presented to the Urgency Room with severe LLQ pain and US showed no arterial flow to the left ovary. Patient was transferred to the [facility] where her pain was treated until surgery could be performed.    Procedure:  The patient was taken to the operating room where general endotracheal anesthesia was administered without difficulty.      Attention was then turned to the patient's abdomen where a 5 mm vertical skin incision was made in the umbilical fold.  The Veress needle was carefully introduced into the peritoneal cavity at a 45 degree angle while tenting the abdominal wall.  Intraperitoneal placement was confirmed by an intraabdominal pressure of 5 mm on insufflation with CO2 gas.  The 5 mm trocar and sleeve were then advanced without difficulty into the abdomen where intraabdominal placement was confirmed by the laparoscope.  No intraabdominal injury was noted from the placement of the first trocar.  After infiltration with 0.25% marcaine, a left lower quadrant 10 mm skin incision was made and trocar and sleeve advanced under direct visualization.  After infiltration with 0.25% marcaine, a 5 mm left mid quadrant skin incision was made and trocar and sleeve advanced under direct visualization.    A survey of the patient's abdomen revealed a normal liver surface.  The patient was then placed in steep Trendelenberg position and a survey of the patient's pelvis revealed a torsed left ovary.    The Ligasure was then advanced through a port and the patient's left ovary identified as torsed. The left IP ligament was sealed with the LigaSure 3 times then  transected. The ovary was placed in a 10 mm bag and removed from the 10 mm port. The 10 mm port was closed with a Cash Gautam with 0 Vicryl.     All instruments and ports were then removed from the patient's abdomen.  Incisions were repaired with 4-0 Monocryl and Dermabond. The patient tolerated the procedure well.  Sponge, lap, and needle counts were correct.  The patient was taken to the recovery room in stable condition.    Ani Bañuelos MD

## 2025-01-26 NOTE — PROGRESS NOTES
Pt just stayed in the ER bed until surgery.    Report given to PACU nurse. Pt is on her way to OR now.

## 2025-01-26 NOTE — ANESTHESIA PROCEDURE NOTES
Airway       Patient location during procedure: OR       Procedure Start/Stop Times: 1/25/2025 9:43 PM  Staff -        CRNA: Santhosh Fierro APRN CRNA       Performed By: CRNA  Consent for Airway        Urgency: elective  Indications and Patient Condition       Indications for airway management: astrid-procedural       Induction type:intravenous       Mask difficulty assessment: 1 - vent by mask    Final Airway Details       Final airway type: endotracheal airway       Successful airway: ETT - single  Endotracheal Airway Details        ETT size (mm): 7.5       Cuffed: yes       Successful intubation technique: direct laryngoscopy       DL Blade Type: Alfredo 2       Grade View of Cords: 1       Adjucts: stylet       Position: Right       Measured from: lips       Secured at (cm): 22       Bite block used: None    Post intubation assessment        Placement verified by: capnometry, equal breath sounds and chest rise        Number of attempts at approach: 1       Number of other approaches attempted: 0       Secured with: tape       Ease of procedure: easy       Dentition: Intact    Medication(s) Administered   Medication Administration Time: 1/25/2025 9:43 PM

## 2025-01-26 NOTE — ANESTHESIA POSTPROCEDURE EVALUATION
Patient: Mary Evans    Procedure: Procedure(s):  OOPHORECTOMY, LAPAROSCOPIC       Anesthesia Type:  General    Note:  Disposition: Outpatient   Postop Pain Control: Uneventful            Sign Out: Well controlled pain   PONV: No   Neuro/Psych: Uneventful            Sign Out: Acceptable/Baseline neuro status   Airway/Respiratory: Uneventful            Sign Out: Acceptable/Baseline resp. status   CV/Hemodynamics: Uneventful            Sign Out: Acceptable CV status; No obvious hypovolemia; No obvious fluid overload   Other NRE: NONE   DID A NON-ROUTINE EVENT OCCUR? No           Last vitals:  Vitals Value Taken Time   /86 01/25/25 2240   Temp 36.1  C (96.9  F) 01/25/25 2230   Pulse 93 01/25/25 2249   Resp 22 01/25/25 2249   SpO2 96 % 01/25/25 2249   Vitals shown include unfiled device data.    Electronically Signed By: Robert Campa MD  January 25, 2025  10:51 PM

## 2025-01-26 NOTE — ANESTHESIA PREPROCEDURE EVALUATION
Anesthesia Pre-Procedure Evaluation    Patient: Mary Evans   MRN: [id number] : 1990        Procedure : Procedure(s):  OOPHORECTOMY, LAPAROSCOPIC          Past Medical History:   Diagnosis Date    Multiple sclerosis (H)       Past Surgical History:   Procedure Laterality Date    C/SECTION, LOW TRANSVERSE  2016    breech / dr tala moreno / [facility]      SECTION      DILATION AND CURETTAGE      HYSTERECTOMY, TOTAL, ROBOT-ASSISTED, USING DA AMADO XI, WITH SALPINGECTOMY Bilateral 10/31/2022    Procedure: ROBOTIC TOTAL LAPAROSCOPIC HYSTERECTOMY, BILATERAL SALPINGECTOMY, CYSTOSCOPY;  Surgeon: Tala Moreno DO;  Location: [facility] Main OR    OTHER SURGICAL HISTORY      uterine septum revision      No Known Allergies   Social History     Tobacco Use    Smoking status: Never    Smokeless tobacco: Not on file   Substance Use Topics    Alcohol use: No      Wt Readings from Last 1 Encounters:   25 79.4 kg (175 lb)        Anesthesia Evaluation   Pt has had prior anesthetic. Type: General.    No history of anesthetic complications       ROS/MED HX  ENT/Pulmonary:       Neurologic:  - neg neurologic ROS   (+)                   Multiple Sclerosis,             Cardiovascular:  - neg cardiovascular ROS  (-) murmur   METS/Exercise Tolerance:     Hematologic:  - neg hematologic  ROS     Musculoskeletal:  - neg musculoskeletal ROS     GI/Hepatic:  - neg GI/hepatic ROS     Renal/Genitourinary: Comment: Torsion of left ovary - neg Renal ROS     Endo:  - neg endo ROS     Psychiatric/Substance Use:  - neg psychiatric ROS     Infectious Disease:  - neg infectious disease ROS     Malignancy:  - neg malignancy ROS     Other:  - neg other ROS          Physical Exam    Airway        Mallampati: II   TM distance: > 3 FB   Neck ROM: full   Mouth opening: > 3 cm    Respiratory Devices and Support         Dental       (+) Minor Abnormalities - some fillings, tiny chips      Cardiovascular         "  Rhythm and rate: regular and normal (-) no murmur    Pulmonary           breath sounds clear to auscultation           OUTSIDE LABS:  CBC:   Lab Results   Component Value Date    WBC 4.2 10/01/2024    WBC 4.3 06/26/2024    HGB 13.7 10/01/2024    HGB 14.8 06/26/2024    HCT 40.0 10/01/2024    HCT 43.4 06/26/2024     10/01/2024     06/26/2024     BMP:   Lab Results   Component Value Date     02/21/2024     07/24/2019    POTASSIUM 4.6 02/21/2024    POTASSIUM 4.5 07/24/2019    CHLORIDE 102 02/21/2024    CHLORIDE 106 07/24/2019    CO2 27 02/21/2024    CO2 28 07/24/2019    BUN 18.5 02/21/2024    BUN 15 07/24/2019    CR 0.64 02/21/2024    CR 0.58 11/01/2023    GLC 98 02/21/2024    GLC 85 07/24/2019     COAGS: No results found for: \"PTT\", \"INR\", \"FIBR\"  POC:   Lab Results   Component Value Date    HCGS Negative 10/31/2022     HEPATIC:   Lab Results   Component Value Date    ALBUMIN 4.2 02/21/2024    PROTTOTAL 6.5 02/21/2024    ALT 21 02/21/2024    AST 20 02/21/2024    ALKPHOS 83 02/21/2024    BILITOTAL 0.5 02/21/2024     OTHER:   Lab Results   Component Value Date    MICKEY 8.9 02/21/2024    TSH 0.73 02/21/2024       Anesthesia Plan    ASA Status:  3, emergent    NPO Status:  NPO Appropriate    Anesthesia Type: General.     - Airway: ETT   Induction: Intravenous, RSI.   Maintenance: TIVA.        Consents    Anesthesia Plan(s) and associated risks, benefits, and realistic alternatives discussed. Questions answered and patient/representative(s) expressed understanding.     - Discussed: Risks, Benefits and Alternatives for BOTH SEDATION and the PROCEDURE were discussed     - Discussed with:  Patient            Postoperative Care    Pain management: IV analgesics, Oral pain medications, Multi-modal analgesia.   PONV prophylaxis: Ondansetron (or other 5HT-3), Dexamethasone or Solumedrol     Comments:    Other Comments: GETA.  RSI.  TIVA.  Decadron and zofran for PONV ppx.  Ketorolac at end of case.        " "   Robert Campa MD    I have reviewed the pertinent notes and labs in the chart from the past 30 days and (re)examined the patient.  Any updates or changes from those notes are reflected in this note.    Clinically Significant Risk Factors Present on Admission                             # Overweight: Estimated body mass index is 26.61 kg/m  as calculated from the following:    Height as of this encounter: 1.727 m (5' 8\").    Weight as of this encounter: 79.4 kg (175 lb).                "

## 2025-01-26 NOTE — ANESTHESIA CARE TRANSFER NOTE
Patient: Mary Evans    Procedure: Procedure(s):  OOPHORECTOMY, LAPAROSCOPIC       Diagnosis: Torsion of left ovary [N83.512]  Diagnosis Additional Information: No value filed.    Anesthesia Type:   General     Note:    Oropharynx: oropharynx clear of all foreign objects  Level of Consciousness: awake  Oxygen Supplementation: face mask  Level of Supplemental Oxygen (L/min / FiO2): 6    Dentition: dentition unchanged  Vital Signs Stable: post-procedure vital signs reviewed and stable  Report to RN Given: handoff report given  Patient transferred to: PACU    Handoff Report: Identifed the Patient, Identified the Reponsible Provider, Reviewed the pertinent medical history, Discussed the surgical course, Reviewed Intra-OP anesthesia mangement and issues during anesthesia, Set expectations for post-procedure period and Allowed opportunity for questions and acknowledgement of understanding    Vitals:  Vitals Value Taken Time   /84 01/25/25 2230   Temp 36.1  C (96.9  F) 01/25/25 2230   Pulse 93 01/25/25 2230   Resp 13 01/25/25 2230   SpO2 100 % 01/25/25 2230       Electronically Signed By: KEVIN Remy CRNA  January 25, 2025  10:33 PM

## 2025-01-26 NOTE — OR NURSING
Major Shift Events:  Doing well pacu phase I & phase ii. Denies pain at this time. Some c/o nausea. Received zofran x1 from me. Lung sounds clear with SpO2 = 100%. Lap sites CDI x3, ice applied. Seen bedside by Dr. Lokesh QUINN.     1150PM-- voided prior to discharge per order.     Plan: Will discharge to home with mother (Diana) providing transportation. After visit summary reviewed, questions answered to verbal satisfaction, all personal belongings returned.     For vital signs and complete assessments, please see documentation flowsheets.     Garcia BIRD RN

## 2025-01-27 ENCOUNTER — PATIENT OUTREACH (OUTPATIENT)
Dept: CARE COORDINATION | Facility: CLINIC | Age: 35
End: 2025-01-27
Payer: COMMERCIAL

## 2025-01-27 NOTE — PROGRESS NOTES
"Clinic Care Coordination Contact  Transitions of Care Outreach  Chief Complaint   Patient presents with    Clinic Care Coordination - Post Hospital       Most Recent Admission Date: 1/25/2025   Most Recent Admission Diagnosis: Cold sore - B00.1  LLQ pain - R10.32  Immunosuppressed status - D84.9  Torsion of left ovary - N83.512     Most Recent Discharge Date: 1/26/2025   Most Recent Discharge Diagnosis: Torsion of left ovary - N83.512  Immunosuppressed status - D84.9  Cold sore - B00.1  LLQ pain - R10.32     Transitions of Care Assessment    Discharge Assessment  How are you doing now that you are home?: \" Doing well \"  How are your symptoms? (Red Flag symptoms escalate to triage hotline per guidelines): Improved  Do you know how to contact your clinic care team if you have future questions or changes to your health status? : Yes  Does the patient have their discharge instructions? : Yes  Does the patient have questions regarding their discharge instructions? : No  Were you started on any new medications or were there changes to any of your previous medications? : Yes  Does the patient have all of their medications?: Yes  Do you have questions regarding any of your medications? : No  Do you have all of your needed medical supplies or equipment (DME)?  (i.e. oxygen tank, CPAP, cane, etc.): Yes    Post-op ([facility] CTA Only)  If the patient had a surgery or procedure, do they have any questions for a nurse?: No           Follow up Plan     Discharge Follow-Up  Discharge follow up appointment scheduled in alignment with recommended follow up timeframe or Transitions of Risk Category? (Low = within 30 days; Moderate= within 14 days; High= within 7 days): Yes  Discharge Follow Up Appointment Date: 02/20/25  Discharge Follow Up Appointment Scheduled with?: Specialty Care Provider    Future Appointments   Date Time Provider Department Center   2/20/2025  8:30 AM Sushila Morfin [facility]   4/23/2025  2:30 PM " Darby Sandoval MD [facility]       Outpatient Plan as outlined on AVS reviewed with patient.    For any urgent concerns, please contact our 24 hour nurse triage line: [phone] ([phone])       Diana Lauren MA

## 2025-01-28 LAB
PATH REPORT.COMMENTS IMP SPEC: NORMAL
PATH REPORT.COMMENTS IMP SPEC: NORMAL
PATH REPORT.FINAL DX SPEC: NORMAL
PATH REPORT.GROSS SPEC: NORMAL
PATH REPORT.MICROSCOPIC SPEC OTHER STN: NORMAL
PATH REPORT.RELEVANT HX SPEC: NORMAL
PHOTO IMAGE: NORMAL

## 2025-01-29 NOTE — DISCHARGE SUMMARY
GYN Discharge Summary    Mary Evans MRN# [id number]   Age: 34 year old YOB: 1990     Date of Admission:  1/25/2025  Date of Discharge::  1/29/2025   Admitting Physician:  Val Bañuelos MD  Discharge Provider:  Val Bañuelos MD     Home clinic: [facility]          Admission Diagnoses:   Ovarian torsion          Discharge Diagnosis:     Same          Procedures:     Procedure(s):  Laparoscopic left oophorectomy               Medications Prior to Admission:     No medications prior to admission.             Discharge Medications:     Discharge Medication List as of 1/25/2025 11:58 PM        START taking these medications    Details   senna-docusate (SENOKOT-S/PERICOLACE) 8.6-50 MG tablet Take 1-2 tablets by mouth 2 times daily as needed for constipation (While on oral opioids.)., Disp-30 tablet, R-0, Local Print           CONTINUE these medications which have CHANGED    Details   oxyCODONE (ROXICODONE) 5 MG tablet Take 1-2 tablets (5-10 mg) by mouth every 4 hours as needed for moderate to severe pain., Disp-12 tablet, R-0, E-Prescribe           CONTINUE these medications which have NOT CHANGED    Details   dalfampridine (AMPYRA) 10 MG TB12 12 hr tablet Take 1 tablet (10 mg) by mouth 2 times daily., Disp-60 tablet, R-11, E-Prescribe      tolterodine (DETROL) 2 MG tablet Take 1 tablet (2 mg) by mouth daily., Disp-30 tablet, R-1, E-Prescribe      valACYclovir (VALTREX) 500 MG tablet Take 1 tablet (500 mg) by mouth daily, Disp-90 tablet, R-3, E-Prescribe      venlafaxine (EFFEXOR-ER) 225 MG 24 hr tablet Take 225 mg by mouth daily, Historical                   Consultations:   No consultations were requested during this admission          Brief History of Illness:   Mary Evans is a 34 year old who presented to the [facility] with LLQ pain, she was found to have possible left ovarian torsion and was referred to the ER. History is obtained through the patient and chart  review. Patient had a robotic hysterectomy with salpingectomy in 2022 for heavy menstrual bleeding. She had sudden LLQ pain since this morning.            Hospital Course:   Patient was discharged from PACU without complications.          Discharge Instructions and Follow-Up:     Discharge diet: Regular   Discharge activity: No heavy lifting for 4 week(s)   Discharge follow-up: Follow up with Dr. Bañuelos in 2 weeks   Wound care Keep wound clean and dry           Discharge Disposition:     Discharged to home      Attestation:  I have reviewed today's vital signs, notes, medications, labs and imaging.    Val Bañuelos MD

## 2025-02-10 ENCOUNTER — DOCUMENTATION ONLY (OUTPATIENT)
Dept: NEUROLOGY | Facility: CLINIC | Age: 35
End: 2025-02-10
Payer: COMMERCIAL

## 2025-02-10 ENCOUNTER — MYC MEDICAL ADVICE (OUTPATIENT)
Dept: NEUROLOGY | Facility: CLINIC | Age: 35
End: 2025-02-10
Payer: COMMERCIAL

## 2025-02-10 ENCOUNTER — MYC REFILL (OUTPATIENT)
Dept: NEUROLOGY | Facility: CLINIC | Age: 35
End: 2025-02-10
Payer: COMMERCIAL

## 2025-02-10 DIAGNOSIS — D84.9 IMMUNOSUPPRESSED STATUS: ICD-10-CM

## 2025-02-10 DIAGNOSIS — G35 MULTIPLE SCLEROSIS (H): ICD-10-CM

## 2025-02-10 DIAGNOSIS — B00.1 COLD SORE: ICD-10-CM

## 2025-02-10 DIAGNOSIS — R53.82 CHRONIC FATIGUE: Primary | ICD-10-CM

## 2025-02-10 RX ORDER — VALACYCLOVIR HYDROCHLORIDE 500 MG/1
500 TABLET, FILM COATED ORAL DAILY
Qty: 90 TABLET | Refills: 3 | Status: SHIPPED | OUTPATIENT
Start: 2025-02-10

## 2025-02-10 RX ORDER — METHYLPHENIDATE HYDROCHLORIDE 20 MG/1
20 TABLET ORAL 3 TIMES DAILY
Qty: 90 TABLET | Refills: 0 | Status: SHIPPED | OUTPATIENT
Start: 2025-02-10

## 2025-02-10 NOTE — PROGRESS NOTES
Orders for physical therapy have been received from [facility], forms placed in Dr. Sandoval's folder for review and signature.   Son Gonzalez EMT February 10, 2025

## 2025-02-10 NOTE — TELEPHONE ENCOUNTER
Patient requesting refill of their methylphenidate; Patient was last seen in June 2024 and has follow up appointment in April 2025 with Dr Sandoval. Not currently active on med list (appears to have been discontinued in med rec on 1/25/25). Request pended to Dr Sandoval for approval.     Vidya Weinberg RN

## 2025-02-10 NOTE — TELEPHONE ENCOUNTER
Patient requesting refill of their Valtrex; Patient was last seen in June 2024 and has follow up appointment in April 2025 with Dr Sandoval. Pended rx to Dr Sandoval for approval.    Vidya Weinberg RN

## 2025-02-13 NOTE — PROGRESS NOTES
Physical therapy orders have been signed and faxed back at [phone].  Son Gonzalez EMT February 13, 2025

## 2025-02-19 ENCOUNTER — TELEPHONE (OUTPATIENT)
Dept: NEUROLOGY | Facility: CLINIC | Age: 35
End: 2025-02-19
Payer: COMMERCIAL

## 2025-02-19 NOTE — TELEPHONE ENCOUNTER
PA Initiation    Medication: MAVENCLAD (6 TABS) 10 MG PO TBPK  Insurance Company: [employer] - Phone [phone] Fax [phone]  Pharmacy Filling the Rx: [facility] - [place], [place] - [address]  Filling Pharmacy Phone:    Filling Pharmacy Fax:    Start Date: 2/19/2025          Thank you,    Romelia Guidry [facility]  Specialty Pharmacy Clinic Liaison [facility] [address]  [address] [place] [address]  Ph: [phone] Fax: [phone]  [email or web address]

## 2025-02-20 ENCOUNTER — VIRTUAL VISIT (OUTPATIENT)
Dept: PHARMACY | Facility: CLINIC | Age: 35
End: 2025-02-20
Attending: PSYCHIATRY & NEUROLOGY
Payer: COMMERCIAL

## 2025-02-20 DIAGNOSIS — G35 MS (MULTIPLE SCLEROSIS) (H): Primary | ICD-10-CM

## 2025-02-20 NOTE — PROGRESS NOTES
Medication Therapy Management (MTM) Encounter    ASSESSMENT:                            Medication Adherence/Access: No issues identified.    MS:  Patient will be due for second treatment round of Mavenclad beginning of March.  Labs are needed still, these have been pended for neurologist review and signature. Patient has no questions at this time.      PLAN:                            Medication list updated and reviewed   Dr. Sandoval did order Mavenclad and there is currently a PA (prior authorization) pending, I will reach out to Dr. Sandoval to get labs ordered prior to starting the next round of medication    Follow-up: 3 months or sooner if needed with Neuro MTM  Appointments in Next Year      Apr 23, 2025 2:30 PM  (Arrive by 2:15 PM)  Return MS with Darby Sandoval MD  [facility] [place] ([facility] [place] ) [phone]          SUBJECTIVE/OBJECTIVE:                          Mary Evans is a 34 year old female seen for a follow up visit    Reason for visit: MTM Follow Up.    Allergies/ADRs: Reviewed in chart  Past Medical History: Reviewed in chart  Tobacco: She reports that she has never smoked. She does not have any smokeless tobacco history on file.  Alcohol: none  Specialty Providers: Neurologist: Dr. Sandoval     Medication Adherence/Access: no issues reported.    MS:   - Dalfampridine 10mg twice daily  - Tolterodine 2 mg daily  - Methylphenidate 20 mg 3 times a day as needed    Patient completed first round of Mavenclad (first cycle in March 2024 and second cycle in April 2024). Due for second round starting in March 2025.     She is not looking forward to taking Mavenclad again but knows its what she should do. She has no questions on the medication or potential side effects.     Today's Vitals: There were no vitals taken for this visit.  ----------------  Post Discharge Medication Reconciliation Status: discharge medications  reconciled, continue medications without change.    I spent 10 minutes with this patient today. All changes were made via collaborative practice agreement with Darby Sandoval.     A summary of these recommendations was sent via [employer].    Sushila Sung, Pharm.D., MPH  Medication Therapy Management Pharmacist   [facility]    Telemedicine Visit Details  The patient's medications can be safely assessed via a telemedicine encounter.  Type of service:  Telephone visit  Originating Location (pt. Location): Home    Distant Location (provider location):  Off-site  Start Time: 8:30 AM  End Time: 8:40AM     Medication Therapy Recommendations  No medication therapy recommendations to display

## 2025-02-25 ENCOUNTER — MYC MEDICAL ADVICE (OUTPATIENT)
Dept: PHARMACY | Facility: CLINIC | Age: 35
End: 2025-02-25
Payer: COMMERCIAL

## 2025-02-25 ENCOUNTER — TELEPHONE (OUTPATIENT)
Dept: PHARMACY | Facility: CLINIC | Age: 35
End: 2025-02-25
Payer: COMMERCIAL

## 2025-02-25 DIAGNOSIS — Z51.81 ENCOUNTER FOR THERAPEUTIC DRUG MONITORING: Primary | ICD-10-CM

## 2025-02-25 NOTE — CONFIDENTIAL NOTE
Patient is due for second treatment round of Mavenclad starting first week of March.     Recommended labs prior to treatment start include:   CBC including lymphocyte count, LFTS,  HIV, tuberculosis, hepatitis B (HBV) and hepatitis C (HCV), varicella zoster virus (VZV) antibody status, and pregnancy test.     Labs pended for Dr. Sandoval's approval.     Sushila Sung, Pharm.D., MPH  Medication Therapy Management Pharmacist   [facility]

## 2025-02-25 NOTE — PATIENT INSTRUCTIONS
"Recommendations from today's MTM visit:                                                      Medication list updated and reviewed   Dr. Sandoval did order Mavenclad and there is currently a PA (prior authorization) pending, I will reach out to Dr. Sandoval to get labs ordered prior to starting the next round of medication    Follow-up: 3 months or sooner if needed with Neuro MTM  Appointments in Next Year      Apr 23, 2025 2:30 PM  (Arrive by 2:15 PM)  Return MS with Darby Sandoval MD  [facility] [place] ([facility] [place] ) [phone]            It was great speaking with you today.  I value your experience and would be very thankful for your time in providing feedback in our clinic survey. In the next few days, you may receive an email or text message from [employer] with a link to a survey related to your  clinical pharmacist.\"     To schedule another MTM appointment, please call the clinic directly or you may call the MTM scheduling line at [phone].    My Clinical Pharmacist's contact information:                                                      Please feel free to contact me with any questions or concerns you have.      Sushila Sung, Pharm.D., MPH  Medication Therapy Management Pharmacist   [facility]   "

## 2025-03-04 NOTE — TELEPHONE ENCOUNTER
Prior Authorization Approval    Medication: MAVENCLAD (6 TABS) 10 MG PO TBPK  Authorization Effective Date: 2/19/2025  Authorization Expiration Date: 2/19/2026  Approved Dose/Quantity: 30 days  Reference #:     Insurance Company: [employer] - Phone [phone] Fax [phone]  Expected CoPay: $    CoPay Card Available:      Financial Assistance Needed:   Which Pharmacy is filling the prescription: [facility] - [place], [place] - [address] [place] [address]  Pharmacy Notified: Yes  Patient Notified: Yes        Thank you,    Romelia Guidry h-T  Specialty Pharmacy Clinic Liaison - [facility] [address]  [address] [place] [address]  Ph: [phone] Fax: [phone]  [email or web address]

## 2025-03-05 NOTE — TELEPHONE ENCOUNTER
Joey sent to remind patient of required labs.     Sushila Sung, Pharm.D., MPH  Medication Therapy Management Pharmacist   [facility]

## 2025-03-06 ENCOUNTER — OFFICE VISIT (OUTPATIENT)
Dept: PHYSICAL MEDICINE AND REHAB | Facility: CLINIC | Age: 35
End: 2025-03-06
Payer: COMMERCIAL

## 2025-03-06 DIAGNOSIS — G35 MS (MULTIPLE SCLEROSIS) (H): Primary | ICD-10-CM

## 2025-03-06 DIAGNOSIS — R25.2 SPASTICITY: ICD-10-CM

## 2025-03-06 DIAGNOSIS — N31.9 NEUROGENIC BLADDER: ICD-10-CM

## 2025-03-06 NOTE — PROGRESS NOTES
Here for evaluation of spasticity and treatment with Botox.    She was last seen by me on 12/12/2024.  She underwent Botox injections at that time.  She has found it very helpful.  She has also been taking Detrol and finds it helping the bladder urgency symptoms.    Interval history is notable for developing an acute abdomen resulting from a torsion of the left ovary which had to be removed emergently.  This was on 1/25/2025.  She is recovering nicely from that.  She continues to use a single ended cane and has an ankle-foot orthosis for the right side that she uses.  She is able to do her home exercise programs.     Patient is a very pleasant 34-year-old woman who reports having MS for the past 15 years.  She has a remitting, relapsing course and experiences significant fatigue.  She has experienced tightness especially in her lower extremities and clumsiness and difficulty using her hands for fine motor tasks.  She has a right ankle-foot orthosis that she sometimes finds it difficult to wear and is not wearing it today.  She has had falls one of them was when she was carrying a laundry basket up the stairs.  She also reports urgency and difficulty controlling her urination.  She denies any bowel issues.  She has been taking Ritalin to deal with her fatigue.  She would like to improve the tightness in her toes ankles as well as the knees.  She denies any particular area of pain or discomfort.     Her last Botox injection was on 8/15/2024.At that time she was noted to have involvement of multiple muscles in the lower extremities resulting in gait disturbance. She received Botox injections and feels it has been very helpful. She has been using a single ended cane as needed. She continues to have an ankle-foot orthosis on the right side. She has been receiving physical therapy. She has also found Detrol to be very helpful in reducing her bladder urgency and frequency.      She has also noticed some tingling in her  right third fourth and fifth digits.  It is worse when she is typing or writing or walking with her cane which she uses on the right side.  She denies any radicular symptoms.  She does not have involvement of the thumb or the index finger on the right.  Left side is not affected.     Past Medical History           Past Medical History:   Diagnosis Date    Multiple sclerosis (H)           Past Surgical History:   Procedure Laterality Date    C/SECTION, LOW TRANSVERSE  2016    breech / dr tala moreno / [facility]      SECTION      DILATION AND CURETTAGE      HYSTERECTOMY, TOTAL, ROBOT-ASSISTED, USING DA AMADO XI, WITH SALPINGECTOMY Bilateral 10/31/2022    Procedure: ROBOTIC TOTAL LAPAROSCOPIC HYSTERECTOMY, BILATERAL SALPINGECTOMY, CYSTOSCOPY;  Surgeon: Tala Moreno DO;  Location: [facility] Main OR    LAPAROSCOPIC OOPHORECTOMY Left 2025    Procedure: Laparoscopic left oophorectomy;  Surgeon: Val Bañuelos MD;  Location: [facility] Main OR    OTHER SURGICAL HISTORY      uterine septum revision        Family History            Problem (# of Occurrences) Relation (Name,Age of Onset)     Cancer (2) Maternal Grandfather: lung, Paternal Grandfather: lung     Urolithiasis (1) Mother                Social History   Social History                Socioeconomic History    Marital status: Single       Spouse name: Not on file    Number of children: Not on file    Years of education: Not on file    Highest education level: Not on file   Occupational History    Not on file   Tobacco Use    Smoking status: Never    Smokeless tobacco: Not on file   Substance and Sexual Activity    Alcohol use: No    Drug use: No    Sexual activity: Yes       Partners: Male   Other Topics Concern    Not on file   Social History Narrative    Not on file      Social Determinants of Health      Financial Resource Strain: Not on file   Food Insecurity: Not on file   Transportation Needs: Not on file   Physical  Activity: Not on file   Stress: Not on file   Social Connections: Not on file   Interpersonal Safety: Not on file   Housing Stability: Not on file            Current Outpatient Medications   Medication Sig Dispense Refill    dalfampridine (AMPYRA) 10 MG TB12 12 hr tablet Take 1 tablet (10 mg) by mouth 2 times daily. 60 tablet 11    methylphenidate (RITALIN) 20 MG tablet Take 1 tablet (20 mg) by mouth 3 times daily. (Patient taking differently: Take 20 mg by mouth 3 times daily as needed.) 90 tablet 0    tolterodine (DETROL) 2 MG tablet Take 1 tablet (2 mg) by mouth daily. 30 tablet 1    valACYclovir (VALTREX) 500 MG tablet Take 1 tablet (500 mg) by mouth daily. (Patient taking differently: Take 500 mg by mouth daily as needed.) 90 tablet 3    venlafaxine (EFFEXOR-ER) 225 MG 24 hr tablet Take 225 mg by mouth daily              On examination, patient is alert and cooperative.  Vitals are stable.  She is afebrile.  HEENT is negative.  Extraocular movements are intact.  Face is symmetric.  Tongue is midline neck is supple.  She is able to move her upper extremities functionally.  She has some difficulty with hand use.  Strength is full.  In the lower extremities she is able to lift her lower extremities left side better than the right side.  Right hip flexor strength is in the 4- out of 5.  She is able to extend the knees and flex again right side weaker than the left.  She has difficulty with ankle dorsiflexion on the right compared to the left.  There is increased tone in the flexor digitorum longus bilaterally gastrocnemius right worse than the left as well as some hamstring involvement right worse than the left.  I could not elicit a catch in her quadriceps with knee flexion.     She has mild tenderness over the ulnar nerve at the elbow.  Tinel's was positive.  Right side was worse than the left.     Romberg is positive.  Gait is characterized by spasticity and some scissoring.  There is some involvement of the  tibialis posterior.  There is toe flexion noted as above.  Finger stenosis of on the left upper extremity compared to the right.     Impression: At this point this 34-year-old woman with multiple sclerosis affecting sensation and causing spasticity with fatigue and bladder disturbance.  She has had occasional falls especially when she is carrying stuff over uneven surface.  We we have talked about proper lighting, and also potential for improving bladder function to decrease falls.  She has been taking Detrol 2 mg in the morning and it is helping in reducing the urgency during the daytime.  For her spasticity, we will continue with 400 units of Botox to treat the various muscles on either side of the lower extremities.  I reviewed the benefits and risks of the procedure including onset within 1 to 3 days, peak in 2 weeks and a duration of 12 weeks.  It may take 1-3 rounds to optimize the site as well as the dosage.       For her right-sided tingling, I encouraged her to watch her position and avoid any compression to the ulnar nerve along the inner forearm.  If it persists, additional testing and/or treatment could be considered.  She also uses a single ended cane, I have suggested using the walker as it might be easier and limit any rotational components.        PROCEDURE NOTE: With her informed consent, after explaining the benefits and risks of the procedure, using preservative-free normal saline for dilution 1 cc per 100 units 400 units of Botox were utilized for injecting with EMG guidance as follows.  50 units were injected into the flexor digitorum longus on either side, and 50 units were injected into the tibialis posterior on either side.  50 units were divided amongst the medial and lateral heads of the gastrocnemius and 50 units were injected into the hamstrings on either side.  400 units were utilized in all.  She tolerated the procedure well.     She can ice the region, take Tylenol as needed for pain.   I will see her in follow-up in 2 weeks for Botox and again 4 weeks after that for a follow-up.     20 minutes visit, exclusive of the procedure time, all on the date of encounter, greater than 50% was for counseling on above-mentioned issues.        Solo Rothman MD

## 2025-03-06 NOTE — LETTER
3/6/2025       RE: Mary Evans  [address] [place] [address]     Dear Colleague,    Thank you for referring your patient, Mary Evans, to the [facility] PHYSICAL MEDICINE AND REHABILITATION CLINIC [place] at [facility]. Please see a copy of my visit note below.    Here for evaluation of spasticity and treatment with Botox.    She was last seen by me on 12/12/2024.  She underwent Botox injections at that time.  She has found it very helpful.  She has also been taking Detrol and finds it helping the bladder urgency symptoms.    Interval history is notable for developing an acute abdomen resulting from a torsion of the left ovary which had to be removed emergently.  This was on 1/25/2025.  She is recovering nicely from that.  She continues to use a single ended cane and has an ankle-foot orthosis for the right side that she uses.  She is able to do her home exercise programs.     Patient is a very pleasant 34-year-old woman who reports having MS for the past 15 years.  She has a remitting, relapsing course and experiences significant fatigue.  She has experienced tightness especially in her lower extremities and clumsiness and difficulty using her hands for fine motor tasks.  She has a right ankle-foot orthosis that she sometimes finds it difficult to wear and is not wearing it today.  She has had falls one of them was when she was carrying a laundry basket up the stairs.  She also reports urgency and difficulty controlling her urination.  She denies any bowel issues.  She has been taking Ritalin to deal with her fatigue.  She would like to improve the tightness in her toes ankles as well as the knees.  She denies any particular area of pain or discomfort.     Her last Botox injection was on 8/15/2024.At that time she was noted to have involvement of multiple muscles in the lower extremities resulting in gait disturbance. She received  Botox injections and feels it has been very helpful. She has been using a single ended cane as needed. She continues to have an ankle-foot orthosis on the right side. She has been receiving physical therapy. She has also found Detrol to be very helpful in reducing her bladder urgency and frequency.      She has also noticed some tingling in her right third fourth and fifth digits.  It is worse when she is typing or writing or walking with her cane which she uses on the right side.  She denies any radicular symptoms.  She does not have involvement of the thumb or the index finger on the right.  Left side is not affected.     Past Medical History           Past Medical History:   Diagnosis Date     Multiple sclerosis (H)           Past Surgical History:   Procedure Laterality Date     C/SECTION, LOW TRANSVERSE  2016    breech / dr tala moreno / [facility]       SECTION       DILATION AND CURETTAGE       HYSTERECTOMY, TOTAL, ROBOT-ASSISTED, USING DA AMADO XI, WITH SALPINGECTOMY Bilateral 10/31/2022    Procedure: ROBOTIC TOTAL LAPAROSCOPIC HYSTERECTOMY, BILATERAL SALPINGECTOMY, CYSTOSCOPY;  Surgeon: Tala Moreno DO;  [facility]: [facility] Main OR     LAPAROSCOPIC OOPHORECTOMY Left 2025    Procedure: Laparoscopic left oophorectomy;  Surgeon: Val Bañuelos MD;  Location: [facility] Main OR     OTHER SURGICAL HISTORY      uterine septum revision        Family History            Problem (# of Occurrences) Relation (Name,Age of Onset)     Cancer (2) Maternal Grandfather: lung, Paternal Grandfather: lung     Urolithiasis (1) Mother                Social History   Social History                Socioeconomic History     Marital status: Single       Spouse name: Not on file     Number of children: Not on file     Years of education: Not on file     Highest education level: Not on file   Occupational History     Not on file   Tobacco Use     Smoking status: Never     Smokeless tobacco: Not  on file   Substance and Sexual Activity     Alcohol use: No     Drug use: No     Sexual activity: Yes       Partners: Male   Other Topics Concern     Not on file   Social History Narrative     Not on file      Social Determinants of Health      Financial Resource Strain: Not on file   Food Insecurity: Not on file   Transportation Needs: Not on file   Physical Activity: Not on file   Stress: Not on file   Social Connections: Not on file   Interpersonal Safety: Not on file   Housing Stability: Not on file            Current Outpatient Medications   Medication Sig Dispense Refill     dalfampridine (AMPYRA) 10 MG TB12 12 hr tablet Take 1 tablet (10 mg) by mouth 2 times daily. 60 tablet 11     methylphenidate (RITALIN) 20 MG tablet Take 1 tablet (20 mg) by mouth 3 times daily. (Patient taking differently: Take 20 mg by mouth 3 times daily as needed.) 90 tablet 0     tolterodine (DETROL) 2 MG tablet Take 1 tablet (2 mg) by mouth daily. 30 tablet 1     valACYclovir (VALTREX) 500 MG tablet Take 1 tablet (500 mg) by mouth daily. (Patient taking differently: Take 500 mg by mouth daily as needed.) 90 tablet 3     venlafaxine (EFFEXOR-ER) 225 MG 24 hr tablet Take 225 mg by mouth daily              On examination, patient is alert and cooperative.  Vitals are stable.  She is afebrile.  HEENT is negative.  Extraocular movements are intact.  Face is symmetric.  Tongue is midline neck is supple.  She is able to move her upper extremities functionally.  She has some difficulty with hand use.  Strength is full.  In the lower extremities she is able to lift her lower extremities left side better than the right side.  Right hip flexor strength is in the 4- out of 5.  She is able to extend the knees and flex again right side weaker than the left.  She has difficulty with ankle dorsiflexion on the right compared to the left.  There is increased tone in the flexor digitorum longus bilaterally gastrocnemius right worse than the left as  well as some hamstring involvement right worse than the left.  I could not elicit a catch in her quadriceps with knee flexion.     She has mild tenderness over the ulnar nerve at the elbow.  Tinel's was positive.  Right side was worse than the left.     Romberg is positive.  Gait is characterized by spasticity and some scissoring.  There is some involvement of the tibialis posterior.  There is toe flexion noted as above.  Finger stenosis of on the left upper extremity compared to the right.     Impression: At this point this 34-year-old woman with multiple sclerosis affecting sensation and causing spasticity with fatigue and bladder disturbance.  She has had occasional falls especially when she is carrying stuff over uneven surface.  We we have talked about proper lighting, and also potential for improving bladder function to decrease falls.  She has been taking Detrol 2 mg in the morning and it is helping in reducing the urgency during the daytime.  For her spasticity, we will continue with 400 units of Botox to treat the various muscles on either side of the lower extremities.  I reviewed the benefits and risks of the procedure including onset within 1 to 3 days, peak in 2 weeks and a duration of 12 weeks.  It may take 1-3 rounds to optimize the site as well as the dosage.       For her right-sided tingling, I encouraged her to watch her position and avoid any compression to the ulnar nerve along the inner forearm.  If it persists, additional testing and/or treatment could be considered.  She also uses a single ended cane, I have suggested using the walker as it might be easier and limit any rotational components.        PROCEDURE NOTE: With her informed consent, after explaining the benefits and risks of the procedure, using preservative-free normal saline for dilution 1 cc per 100 units 400 units of Botox were utilized for injecting with EMG guidance as follows.  50 units were injected into the flexor digitorum  longus on either side, and 50 units were injected into the tibialis posterior on either side.  50 units were divided amongst the medial and lateral heads of the gastrocnemius and 50 units were injected into the hamstrings on either side.  400 units were utilized in all.  She tolerated the procedure well.     She can ice the region, take Tylenol as needed for pain.  I will see her in follow-up in 2 weeks for Botox and again 4 weeks after that for a follow-up.     20 minutes visit, exclusive of the procedure time, all on the date of encounter, greater than 50% was for counseling on above-mentioned issues.        Solo Rothman MD       Again, thank you for allowing me to participate in the care of your patient.      Sincerely,    Solo Rothman MD

## 2025-03-12 ENCOUNTER — LAB (OUTPATIENT)
Dept: LAB | Facility: CLINIC | Age: 35
End: 2025-03-12
Payer: COMMERCIAL

## 2025-03-12 DIAGNOSIS — Z51.81 ENCOUNTER FOR THERAPEUTIC DRUG MONITORING: ICD-10-CM

## 2025-03-12 LAB
ALBUMIN SERPL BCG-MCNC: 4.5 G/DL (ref 3.5–5.2)
ALP SERPL-CCNC: 62 U/L (ref 40–150)
ALT SERPL W P-5'-P-CCNC: 10 U/L (ref 0–50)
AST SERPL W P-5'-P-CCNC: 18 U/L (ref 0–45)
BASOPHILS # BLD AUTO: 0 10E3/UL (ref 0–0.2)
BASOPHILS NFR BLD AUTO: 0 %
BILIRUB DIRECT SERPL-MCNC: 0.25 MG/DL (ref 0–0.3)
BILIRUB SERPL-MCNC: 0.6 MG/DL
EOSINOPHIL # BLD AUTO: 0 10E3/UL (ref 0–0.7)
EOSINOPHIL NFR BLD AUTO: 0 %
ERYTHROCYTE [DISTWIDTH] IN BLOOD BY AUTOMATED COUNT: 12.2 % (ref 10–15)
HBV CORE AB SERPL QL IA: NONREACTIVE
HBV SURFACE AB SERPL IA-ACNC: >1000 M[IU]/ML
HBV SURFACE AB SERPL IA-ACNC: REACTIVE M[IU]/ML
HBV SURFACE AG SERPL QL IA: NONREACTIVE
HCG SERPL QL: NEGATIVE
HCT VFR BLD AUTO: 40.6 % (ref 35–47)
HCV AB SERPL QL IA: NONREACTIVE
HGB BLD-MCNC: 14.3 G/DL (ref 11.7–15.7)
HIV 1+2 AB+HIV1 P24 AG SERPL QL IA: NONREACTIVE
IMM GRANULOCYTES # BLD: 0 10E3/UL
IMM GRANULOCYTES NFR BLD: 0 %
LYMPHOCYTES # BLD AUTO: 0.6 10E3/UL (ref 0.8–5.3)
LYMPHOCYTES NFR BLD AUTO: 9 %
MCH RBC QN AUTO: 32.3 PG (ref 26.5–33)
MCHC RBC AUTO-ENTMCNC: 35.2 G/DL (ref 31.5–36.5)
MCV RBC AUTO: 92 FL (ref 78–100)
MONOCYTES # BLD AUTO: 0.1 10E3/UL (ref 0–1.3)
MONOCYTES NFR BLD AUTO: 1 %
NEUTROPHILS # BLD AUTO: 6.1 10E3/UL (ref 1.6–8.3)
NEUTROPHILS NFR BLD AUTO: 90 %
NRBC # BLD AUTO: 0 10E3/UL
NRBC BLD AUTO-RTO: 0 /100
PLATELET # BLD AUTO: 280 10E3/UL (ref 150–450)
PROT SERPL-MCNC: 7.2 G/DL (ref 6.4–8.3)
RBC # BLD AUTO: 4.43 10E6/UL (ref 3.8–5.2)
WBC # BLD AUTO: 6.7 10E3/UL (ref 4–11)

## 2025-03-12 PROCEDURE — 86787 VARICELLA-ZOSTER ANTIBODY: CPT

## 2025-03-12 PROCEDURE — 87389 HIV-1 AG W/HIV-1&-2 AB AG IA: CPT

## 2025-03-12 PROCEDURE — 36415 COLL VENOUS BLD VENIPUNCTURE: CPT

## 2025-03-12 PROCEDURE — 86706 HEP B SURFACE ANTIBODY: CPT

## 2025-03-12 PROCEDURE — 85025 COMPLETE CBC W/AUTO DIFF WBC: CPT

## 2025-03-12 PROCEDURE — 86481 TB AG RESPONSE T-CELL SUSP: CPT

## 2025-03-12 PROCEDURE — 86803 HEPATITIS C AB TEST: CPT

## 2025-03-12 PROCEDURE — 87340 HEPATITIS B SURFACE AG IA: CPT

## 2025-03-12 PROCEDURE — 80076 HEPATIC FUNCTION PANEL: CPT

## 2025-03-12 PROCEDURE — 84703 CHORIONIC GONADOTROPIN ASSAY: CPT

## 2025-03-12 PROCEDURE — 86704 HEP B CORE ANTIBODY TOTAL: CPT

## 2025-03-13 LAB
GAMMA INTERFERON BACKGROUND BLD IA-ACNC: 0 IU/ML
M TB IFN-G BLD-IMP: ABNORMAL
M TB IFN-G CD4+ BCKGRND COR BLD-ACNC: 0.25 IU/ML
MITOGEN IGNF BCKGRD COR BLD-ACNC: 0 IU/ML
MITOGEN IGNF BCKGRD COR BLD-ACNC: 0 IU/ML
QUANTIFERON MITOGEN: 0.25 IU/ML
QUANTIFERON NIL TUBE: 0 IU/ML
QUANTIFERON TB1 TUBE: 0 IU/ML
QUANTIFERON TB2 TUBE: 0
VZV IGG SER QL IA: 16.2 S/CO
VZV IGG SER QL IA: POSITIVE

## 2025-03-17 ENCOUNTER — MYC MEDICAL ADVICE (OUTPATIENT)
Dept: PHARMACY | Facility: CLINIC | Age: 35
End: 2025-03-17
Payer: COMMERCIAL

## 2025-03-17 DIAGNOSIS — G35 MS (MULTIPLE SCLEROSIS) (H): Primary | ICD-10-CM

## 2025-03-24 RX ORDER — CLADRIBINE 10 MG/1
TABLET ORAL
Qty: 6 EACH | Refills: 0 | Status: SHIPPED | OUTPATIENT
Start: 2025-03-24 | End: 2025-03-29

## 2025-03-24 NOTE — TELEPHONE ENCOUNTER
Ordering Mavenclad prescription per CPA with Dr. Sandoval.     Sushila Sung, Pharm.D., MPH  Medication Therapy Management Pharmacist   [facility] Neurology [facility]

## 2025-03-24 NOTE — TELEPHONE ENCOUNTER
Addressing mychart message.    PT CALLING [facility] HE HASN'T BEEN ABLE TO GET APPT W/ [facility]. CALLED [facility] AND THEY NEED DR. GARDNER'S NOTES AND MRI DONE IN SEPT. THEY WILL NOT GET THOSE RECORDS THEMSELVES, WE HAVE TO GET AND GIVE TO THEM. S/W ADA AND SHE SAID [facility] IS ALREADY WORKING ON GETTING THAT. PT MADE AWARE AND HE V/U.

## 2025-04-11 ENCOUNTER — MYC MEDICAL ADVICE (OUTPATIENT)
Dept: PHARMACY | Facility: CLINIC | Age: 35
End: 2025-04-11
Payer: COMMERCIAL

## 2025-04-11 DIAGNOSIS — G35 MS (MULTIPLE SCLEROSIS) (H): Primary | ICD-10-CM

## 2025-04-11 DIAGNOSIS — Z51.81 THERAPEUTIC DRUG MONITORING: ICD-10-CM

## 2025-04-14 NOTE — TELEPHONE ENCOUNTER
Patient completed second year round 1 of Mavenclad 4/11/2025. She will be due for second round in May.     Recommended labs: CBC with differential + LFTs + pregnancy screening before each treatment course as well as a CBC with differential 2 and 6 months after completing each yearly course.     Routing to neurologist to approve labs. Once approved will notify patient.     Sushila Sung, Pharm.D., MPH  Medication Therapy Management Pharmacist   [facility]rology [facility]

## 2025-04-16 NOTE — TELEPHONE ENCOUNTER
Labs ordered per Dr. Sandoval.     Patient notified of recommended labs via [employer].     Sushila Sung, Pharm.D., MPH  Medication Therapy Management Pharmacist   [facility]rology [facility]

## 2025-04-16 NOTE — TELEPHONE ENCOUNTER
Patient had hysterectomy with salpingectomy in 2022. Urine pregnancy labs discontinued.     Also added Hepatic panel.     Sushila Sung, Pharm.D., MPH  Medication Therapy Management Pharmacist   [facility]rology [facility]

## 2025-04-23 ENCOUNTER — LAB (OUTPATIENT)
Dept: LAB | Facility: CLINIC | Age: 35
End: 2025-04-23
Payer: COMMERCIAL

## 2025-04-23 ENCOUNTER — OFFICE VISIT (OUTPATIENT)
Dept: NEUROLOGY | Facility: CLINIC | Age: 35
End: 2025-04-23
Attending: PSYCHIATRY & NEUROLOGY
Payer: COMMERCIAL

## 2025-04-23 ENCOUNTER — MYC REFILL (OUTPATIENT)
Dept: PHYSICAL MEDICINE AND REHAB | Facility: CLINIC | Age: 35
End: 2025-04-23

## 2025-04-23 VITALS
BODY MASS INDEX: 21.73 KG/M2 | OXYGEN SATURATION: 98 % | WEIGHT: 146.7 LBS | HEIGHT: 69 IN | SYSTOLIC BLOOD PRESSURE: 119 MMHG | HEART RATE: 100 BPM | DIASTOLIC BLOOD PRESSURE: 86 MMHG

## 2025-04-23 DIAGNOSIS — G35 MS (MULTIPLE SCLEROSIS) (H): ICD-10-CM

## 2025-04-23 DIAGNOSIS — R26.81 GAIT INSTABILITY: ICD-10-CM

## 2025-04-23 DIAGNOSIS — Z51.81 THERAPEUTIC DRUG MONITORING: ICD-10-CM

## 2025-04-23 DIAGNOSIS — G35 MULTIPLE SCLEROSIS (H): ICD-10-CM

## 2025-04-23 DIAGNOSIS — N31.9 NEUROGENIC BLADDER: ICD-10-CM

## 2025-04-23 DIAGNOSIS — R53.82 CHRONIC FATIGUE: ICD-10-CM

## 2025-04-23 DIAGNOSIS — G35 MULTIPLE SCLEROSIS (H): Primary | ICD-10-CM

## 2025-04-23 DIAGNOSIS — R25.2 SPASTICITY: ICD-10-CM

## 2025-04-23 LAB
ALBUMIN SERPL BCG-MCNC: 4.4 G/DL (ref 3.5–5.2)
ALP SERPL-CCNC: 66 U/L (ref 40–150)
ALT SERPL W P-5'-P-CCNC: 16 U/L (ref 0–50)
AST SERPL W P-5'-P-CCNC: 17 U/L (ref 0–45)
BASOPHILS # BLD AUTO: 0 10E3/UL (ref 0–0.2)
BASOPHILS NFR BLD AUTO: 1 %
BILIRUB DIRECT SERPL-MCNC: 0.22 MG/DL (ref 0–0.3)
BILIRUB SERPL-MCNC: 0.4 MG/DL
EOSINOPHIL # BLD AUTO: 0 10E3/UL (ref 0–0.7)
EOSINOPHIL NFR BLD AUTO: 1 %
ERYTHROCYTE [DISTWIDTH] IN BLOOD BY AUTOMATED COUNT: 12.9 % (ref 10–15)
FERRITIN SERPL-MCNC: 256 NG/ML (ref 6–175)
FOLATE SERPL-MCNC: 7 NG/ML (ref 4.6–34.8)
HCT VFR BLD AUTO: 41 % (ref 35–47)
HGB BLD-MCNC: 14.1 G/DL (ref 11.7–15.7)
IMM GRANULOCYTES # BLD: 0 10E3/UL
IMM GRANULOCYTES NFR BLD: 0 %
IRON SERPL-MCNC: 73 UG/DL (ref 37–145)
LYMPHOCYTES # BLD AUTO: 0.9 10E3/UL (ref 0.8–5.3)
LYMPHOCYTES NFR BLD AUTO: 17 %
MCH RBC QN AUTO: 32.3 PG (ref 26.5–33)
MCHC RBC AUTO-ENTMCNC: 34.4 G/DL (ref 31.5–36.5)
MCV RBC AUTO: 94 FL (ref 78–100)
MONOCYTES # BLD AUTO: 0.4 10E3/UL (ref 0–1.3)
MONOCYTES NFR BLD AUTO: 7 %
NEUTROPHILS # BLD AUTO: 3.7 10E3/UL (ref 1.6–8.3)
NEUTROPHILS NFR BLD AUTO: 74 %
NRBC # BLD AUTO: 0 10E3/UL
NRBC BLD AUTO-RTO: 0 /100
PLATELET # BLD AUTO: 192 10E3/UL (ref 150–450)
PROT SERPL-MCNC: 6.7 G/DL (ref 6.4–8.3)
RBC # BLD AUTO: 4.37 10E6/UL (ref 3.8–5.2)
WBC # BLD AUTO: 5 10E3/UL (ref 4–11)

## 2025-04-23 PROCEDURE — 83540 ASSAY OF IRON: CPT | Performed by: PATHOLOGY

## 2025-04-23 PROCEDURE — 85025 COMPLETE CBC W/AUTO DIFF WBC: CPT | Performed by: PATHOLOGY

## 2025-04-23 PROCEDURE — 82728 ASSAY OF FERRITIN: CPT | Performed by: PATHOLOGY

## 2025-04-23 PROCEDURE — 82607 VITAMIN B-12: CPT | Performed by: PSYCHIATRY & NEUROLOGY

## 2025-04-23 PROCEDURE — 80076 HEPATIC FUNCTION PANEL: CPT | Performed by: PATHOLOGY

## 2025-04-23 PROCEDURE — 82746 ASSAY OF FOLIC ACID SERUM: CPT | Performed by: PSYCHIATRY & NEUROLOGY

## 2025-04-23 PROCEDURE — 99000 SPECIMEN HANDLING OFFICE-LAB: CPT | Performed by: PATHOLOGY

## 2025-04-23 PROCEDURE — 36415 COLL VENOUS BLD VENIPUNCTURE: CPT | Performed by: PATHOLOGY

## 2025-04-23 PROCEDURE — G0463 HOSPITAL OUTPT CLINIC VISIT: HCPCS | Performed by: PSYCHIATRY & NEUROLOGY

## 2025-04-23 RX ORDER — CLADRIBINE 10 MG/1
TABLET ORAL
COMMUNITY

## 2025-04-23 RX ORDER — METHYLPHENIDATE HYDROCHLORIDE 30 MG/1
30 CAPSULE, EXTENDED RELEASE ORAL EVERY MORNING
Qty: 30 CAPSULE | Refills: 0 | Status: SHIPPED | OUTPATIENT
Start: 2025-04-23

## 2025-04-23 ASSESSMENT — PAIN SCALES - GENERAL: PAINLEVEL_OUTOF10: NO PAIN (0)

## 2025-04-23 NOTE — NURSING NOTE
Chief Complaint   Patient presents with    MS     Return          Vitals were taken and medications were reconciled.   Son Gonzalez, EMT  2:22 PM

## 2025-04-23 NOTE — PATIENT INSTRUCTIONS
Blood work today     I encourage use of a walker     Try the extended release methylphenidate     Follow up in 6 months

## 2025-04-23 NOTE — LETTER
4/23/2025       RE: Mary Evans  [address] [place] [address]     Dear Colleague,    Thank you for referring your patient, Mary Evans, to the [facility] MULTIPLE SCLEROSIS CLINIC [place] at [facility]. Please see a copy of my visit note below.    Date of Service: 4/23/2025    [facility] Neurology   MS Clinic Evaluation    Subjective: 34 y/o woman who presents for evaluation of MS     She presents to the visit unaccompanied.    She feels that she has been worse in the past year.  She notes that this corresponds to the time that she has been on Mavenclad, but also acknowledges that she did not have any new lesions on her last scan.    She is following on a daily basis.  She utilizes a wheelchair or a cane to get around.  She often times will use the wheelchair as a walker.  She has not been prescribed a walker.  Her right leg has a tendency to drag.  Falls will occur because she loses balance where she tripped over her leg.    She feels tired all of the time.  She feels that she can sleep all day and still feel tired.  She does find Ritalin helpful, but notes that she becomes irritable as the effect wears off and can be a bit over elated when the medication is effective.  She is currently living with family.  She has to go up 6 stairs to get into the home and has to go up 12 stairs to get into the kitchen.    She is following with rehab medicine.  They have been administering Botox.  They also ordered Detrol which she has found helpful for her bladder symptoms.    She reports some shooting numbness/tingling in the groin region.  This is present on both sides.  It can sometimes run down the inside of the legs to the ankle.  This is similar to something that she experienced in the past, but in the past it would happen with bending of the neck.  It most commonly occurs when she is sitting, driving or laying down, it does not happen when she  is standing.    She did complete her first round of Mavenclad for this year.  Her next round will start on May 6.    She is currently working with physical therapy at [facility] for a back injury.    She has not had any major illnesses.    She is taking vitamin D3 5000 international unit(s) daily.     Disease onset: age 17, cervical sensory myelitis, R ON   LR uncertain     DMD hx:   Avonex 2008, brief, suffered severe flu side effects  betaseron 2008 x several months, radiologic progression   Copaxone several years, interrupted for pregnancy  Copaxone 40 mg - developed hives  Gilenya 2016 - discontinued for pregnancy  Copaxone 20 mg consistently [id number], radiologic progression   tecfidera 2019   ocrevus 2019-present, LD 11/1/2023, clinical and radiologic progression   Mavenclad 3/2024-present, LD 4/2024      No Known Allergies    Current Outpatient Medications   Medication Sig Dispense Refill     Cladribine, 10 Tabs, (MAVENCLAD, 10 TABS,) 10 MG TBPK Take by mouth.       dalfampridine (AMPYRA) 10 MG TB12 12 hr tablet Take 1 tablet (10 mg) by mouth 2 times daily. 60 tablet 11     methylphenidate (RITALIN) 20 MG tablet Take 1 tablet (20 mg) by mouth 3 times daily. 90 tablet 0     tolterodine (DETROL) 2 MG tablet Take 1 tablet (2 mg) by mouth daily. 30 tablet 1     valACYclovir (VALTREX) 500 MG tablet Take 1 tablet (500 mg) by mouth daily. 90 tablet 3     venlafaxine (EFFEXOR-ER) 225 MG 24 hr tablet Take 225 mg by mouth daily       Current Facility-Administered Medications   Medication Dose Route Frequency Provider Last Rate Last Admin     botulinum toxin type A (BOTOX) 100 units injection 400 Units  400 Units Intramuscular Q90 Days Solo Rothman MD   400 Units at 03/06/25 1426        Past medical, surgical, social and family history was personally reviewed. Pertinent details noted above.     Physical Examination:   /86 (BP Location: Left arm, Patient Position: Sitting, Cuff Size: Adult Regular)   Pulse 100   " Ht 1.743 m (5' 8.62\")   Wt 66.5 kg (146 lb 11.2 oz)   SpO2 98%   BMI 21.90 kg/m      General: no acute distress  Cranial nerves:   VFFC   PERRL  EOM full w/no LUIS CARLOS   Face symmetric  Hearing intact  No dysarthria   Motor:   Tone is mildly increased in the lower extremities  Bulk is normal     R L  Deltoid  5 5  Biceps  5 5  Triceps  5 5  Wrist ext 5 5  Finger ext 5 5  Finger abd 5 5    Hip flexion 4- 4+   Knee flexion 4+ 5  Knee ext 5- 5  Ankle d/f 4+ 5    Reflexes: 1+ and symmetric throughout, babinski present on the left  Sensory: vibration is severely reduced in the toes, moderately reduced in the ankles, JPS mildly  reduced in the left toe normal on the right   Coordination: mild ataxia of the RLE  Gait: spastic parapretic gait with more circumduction of the RLE    Tests/Imaging:     Vitamin D 57.7  JCV Ab neg      Hepatic function wnl     MRI Brain  2019 - low lesion burden, multiple small pvl, few jcl  1/2024 - new R parietal lesion  11/2024-no new lesions, gd-     MRI Cervical spine   2018 - study limited by motion artifact, multiple eccentric cord lesions in upper cervical cord with large lesion dorsal cord c2  1/2024 - no new lesions  11/2024-no new lesions, gd-     MRI Thoracic spine   2019 - images done, but no t2 images available for personal review  1/2024 - multiple lesions noted  11/2024-no new lesions, gd-     Assessment: 36 y/o woman with relapsing remitting MS. she is now status post 1 year of Mavenclad and prison through year 2.  She seems to be tolerating treatment.  She is noted to be radiologically stable.  She feels that clinically she has declined.    Examination in clinic today is fairly stable compared to last year.  She is struggling with a lot of fatigue.  I recommended some blood testing to assess for a possible metabolic cause contributing to her fatigue.  Otherwise she can try using extended release methylphenidate to see if this is more agreeable for management of her " fatigue.    She is encouraged to utilize a walker for gait.  She seems to be at very high risk for falls and would benefit from the bilateral assistance.  A seat would also be helpful to allow her to sit when she starts to feel fatigued.    She is advised to monitor the shooting numbness tingling that she is experiencing in her groins.  If no improvement after the second dose of Mavenclad, then EMG could be considered.    Plan:   -Blood work today  - Continue dalfampridine, Detrol  - Proceed with Mavenclad in May  - Trial of methylphenidate extended release  - Walker ordered today  - Follow-up in 6 months    Note was completed with the assistance of Dragon Fluency software which can often result in accidental word substitutions.   The longitudinal plan of care for the diagnosis(es)/condition(s) as documented were addressed during this visit. Due to the added complexity in care, I will continue to support Mary in the subsequent management and with ongoing continuity of care.    30 minutes spent in the care of this patient on the date of service.  Darby Sandoval MD on 4/23/2025 at 2:36 PM              Again, thank you for allowing me to participate in the care of your patient.      Sincerely,    Darby Sandoval MD

## 2025-04-23 NOTE — PROGRESS NOTES
Date of Service: 4/23/2025    [facility]   MS Clinic Evaluation    Subjective: 34 y/o woman who presents for evaluation of MS     She presents to the visit unaccompanied.    She feels that she has been worse in the past year.  She notes that this corresponds to the time that she has been on Mavenclad, but also acknowledges that she did not have any new lesions on her last scan.    She is following on a daily basis.  She utilizes a wheelchair or a cane to get around.  She often times will use the wheelchair as a walker.  She has not been prescribed a walker.  Her right leg has a tendency to drag.  Falls will occur because she loses balance where she tripped over her leg.    She feels tired all of the time.  She feels that she can sleep all day and still feel tired.  She does find Ritalin helpful, but notes that she becomes irritable as the effect wears off and can be a bit over elated when the medication is effective.  She is currently living with family.  She has to go up 6 stairs to get into the home and has to go up 12 stairs to get into the kitchen.    She is following with rehab medicine.  They have been administering Botox.  They also ordered Detrol which she has found helpful for her bladder symptoms.    She reports some shooting numbness/tingling in the groin region.  This is present on both sides.  It can sometimes run down the inside of the legs to the ankle.  This is similar to something that she experienced in the past, but in the past it would happen with bending of the neck.  It most commonly occurs when she is sitting, driving or laying down, it does not happen when she is standing.    She did complete her first round of Mavenclad for this year.  Her next round will start on May 6.    She is currently working with physical therapy at [facility] for a back injury.    She has not had any major illnesses.    She is taking vitamin D3 5000 international unit(s) daily.     Disease onset: age 17, cervical  "sensory myelitis, R ON   LR uncertain     DMD hx:   Avonex 2008, brief, suffered severe flu side effects  betaseron 2008 x several months, radiologic progression   Copaxone several years, interrupted for pregnancy  Copaxone 40 mg - developed hives  Gilenya 2016 - discontinued for pregnancy  Copaxone 20 mg consistently [id number], radiologic progression   tecfidera 2019   ocrevus 2019-present, LD 11/1/2023, clinical and radiologic progression   Mavenclad 3/2024-present, LD 4/2024      No Known Allergies    Current Outpatient Medications   Medication Sig Dispense Refill    Cladribine, 10 Tabs, (MAVENCLAD, 10 TABS,) 10 MG TBPK Take by mouth.      dalfampridine (AMPYRA) 10 MG TB12 12 hr tablet Take 1 tablet (10 mg) by mouth 2 times daily. 60 tablet 11    methylphenidate (RITALIN) 20 MG tablet Take 1 tablet (20 mg) by mouth 3 times daily. 90 tablet 0    tolterodine (DETROL) 2 MG tablet Take 1 tablet (2 mg) by mouth daily. 30 tablet 1    valACYclovir (VALTREX) 500 MG tablet Take 1 tablet (500 mg) by mouth daily. 90 tablet 3    venlafaxine (EFFEXOR-ER) 225 MG 24 hr tablet Take 225 mg by mouth daily       Current Facility-Administered Medications   Medication Dose Route Frequency Provider Last Rate Last Admin    botulinum toxin type A (BOTOX) 100 units injection 400 Units  400 Units Intramuscular Q90 Days Solo Rothman MD   400 Units at 03/06/25 1426        Past medical, surgical, social and family history was personally reviewed. Pertinent details noted above.     Physical Examination:   /86 (BP Location: Left arm, Patient Position: Sitting, Cuff Size: Adult Regular)   Pulse 100   Ht 1.743 m (5' 8.62\")   Wt 66.5 kg (146 lb 11.2 oz)   SpO2 98%   BMI 21.90 kg/m      General: no acute distress  Cranial nerves:   VFFC   PERRL  EOM full w/no LUIS CARLOS   Face symmetric  Hearing intact  No dysarthria   Motor:   Tone is mildly increased in the lower extremities  Bulk is normal "     R L  Deltoid  5 5  Biceps  5 5  Triceps  5 5  Wrist ext 5 5  Finger ext 5 5  Finger abd 5 5    Hip flexion 4- 4+   Knee flexion 4+ 5  Knee ext 5- 5  Ankle d/f 4+ 5    Reflexes: 1+ and symmetric throughout, babinski present on the left  Sensory: vibration is severely reduced in the toes, moderately reduced in the ankles, JPS mildly  reduced in the left toe normal on the right   Coordination: mild ataxia of the RLE  Gait: spastic parapretic gait with more circumduction of the RLE    Tests/Imaging:     Vitamin D 57.7  JCV Ab neg      Hepatic function wnl     MRI Brain  2019 - low lesion burden, multiple small pvl, few jcl  1/2024 - new R parietal lesion  11/2024-no new lesions, gd-     MRI Cervical spine   2018 - study limited by motion artifact, multiple eccentric cord lesions in upper cervical cord with large lesion dorsal cord c2  1/2024 - no new lesions  11/2024-no new lesions, gd-     MRI Thoracic spine   2019 - images done, but no t2 images available for personal review  1/2024 - multiple lesions noted  11/2024-no new lesions, gd-     Assessment: 36 y/o woman with relapsing remitting MS. she is now status post 1 year of Mavenclad and longterm through year 2.  She seems to be tolerating treatment.  She is noted to be radiologically stable.  She feels that clinically she has declined.    Examination in clinic today is fairly stable compared to last year.  She is struggling with a lot of fatigue.  I recommended some blood testing to assess for a possible metabolic cause contributing to her fatigue.  Otherwise she can try using extended release methylphenidate to see if this is more agreeable for management of her fatigue.    She is encouraged to utilize a walker for gait.  She seems to be at very high risk for falls and would benefit from the bilateral assistance.  A seat would also be helpful to allow her to sit when she starts to feel fatigued.    She is advised to monitor the shooting numbness tingling  that she is experiencing in her groins.  If no improvement after the second dose of Mavenclad, then EMG could be considered.    Plan:   -Blood work today  - Continue dalfampridine, Detrol  - Proceed with Mavenclad in May  - Trial of methylphenidate extended release  - Walker ordered today  - Follow-up in 6 months    Note was completed with the assistance of Dragon Fluency software which can often result in accidental word substitutions.   The longitudinal plan of care for the diagnosis(es)/condition(s) as documented were addressed during this visit. Due to the added complexity in care, I will continue to support Mary in the subsequent management and with ongoing continuity of care.    30 minutes spent in the care of this patient on the date of service.  Darby Sandoval MD on 4/23/2025 at 2:36 PM

## 2025-04-24 LAB — VIT B12 SERPL-MCNC: 520 PG/ML (ref 232–1245)

## 2025-04-24 RX ORDER — TOLTERODINE TARTRATE 2 MG/1
2 TABLET, EXTENDED RELEASE ORAL DAILY
Qty: 30 TABLET | Refills: 1 | Status: SHIPPED | OUTPATIENT
Start: 2025-04-24

## 2025-04-25 DIAGNOSIS — G35 MS (MULTIPLE SCLEROSIS) (H): Primary | ICD-10-CM

## 2025-05-01 RX ORDER — CLADRIBINE 10 MG/1
TABLET ORAL
Qty: 6 EACH | Refills: 0 | Status: SHIPPED | OUTPATIENT
Start: 2025-05-01 | End: 2025-05-06

## 2025-05-17 ENCOUNTER — HEALTH MAINTENANCE LETTER (OUTPATIENT)
Age: 35
End: 2025-05-17

## 2025-05-20 ENCOUNTER — TELEPHONE (OUTPATIENT)
Dept: NEUROLOGY | Facility: CLINIC | Age: 35
End: 2025-05-20

## 2025-05-28 DIAGNOSIS — N31.9 NEUROGENIC BLADDER: ICD-10-CM

## 2025-05-28 DIAGNOSIS — R26.81 GAIT INSTABILITY: ICD-10-CM

## 2025-05-28 DIAGNOSIS — G35 MS (MULTIPLE SCLEROSIS) (H): ICD-10-CM

## 2025-05-28 DIAGNOSIS — R25.2 SPASTICITY: ICD-10-CM

## 2025-05-28 RX ORDER — TOLTERODINE TARTRATE 2 MG/1
2 TABLET, EXTENDED RELEASE ORAL DAILY
Qty: 30 TABLET | Refills: 1 | Status: SHIPPED | OUTPATIENT
Start: 2025-05-28

## 2025-05-28 NOTE — TELEPHONE ENCOUNTER
Refill request received from Pharmacy    Medication: tolterodine (DETROL) 2 MG tablet   Directions: Take 1 tablet (2 mg) by mouth daily. - Oral      Last ordered: 05/25/25  Qty: 30  RF: 1  Last OV: 03/06/25  Next OV: None scheduled    Routing to Dr. Rothman to review and sign if appropriate.    Chely Linda RN Care Coordinator  [facility]   [facility] main phone # [phone] fax # [phone]

## 2025-08-14 ENCOUNTER — LAB (OUTPATIENT)
Dept: LAB | Facility: CLINIC | Age: 35
End: 2025-08-14
Payer: COMMERCIAL

## 2025-08-14 DIAGNOSIS — G35 MS (MULTIPLE SCLEROSIS) (H): ICD-10-CM

## 2025-08-14 DIAGNOSIS — Z51.81 THERAPEUTIC DRUG MONITORING: ICD-10-CM

## 2025-08-14 LAB
BASOPHILS # BLD AUTO: <0.04 10E3/UL (ref 0–0.2)
BASOPHILS NFR BLD AUTO: 0.4 %
EOSINOPHIL # BLD AUTO: 0.04 10E3/UL (ref 0–0.7)
EOSINOPHIL NFR BLD AUTO: 0.8 %
ERYTHROCYTE [DISTWIDTH] IN BLOOD BY AUTOMATED COUNT: 12.1 % (ref 10–15)
HCT VFR BLD AUTO: 41.3 % (ref 35–47)
HGB BLD-MCNC: 14.2 G/DL (ref 11.7–15.7)
IMM GRANULOCYTES # BLD: <0.04 10E3/UL
IMM GRANULOCYTES NFR BLD: 0 %
LYMPHOCYTES # BLD AUTO: 0.94 10E3/UL (ref 0.8–5.3)
LYMPHOCYTES NFR BLD AUTO: 19 %
MCH RBC QN AUTO: 33.1 PG (ref 26.5–33)
MCHC RBC AUTO-ENTMCNC: 34.4 G/DL (ref 31.5–36.5)
MCV RBC AUTO: 96.3 FL (ref 78–100)
MONOCYTES # BLD AUTO: 0.39 10E3/UL (ref 0–1.3)
MONOCYTES NFR BLD AUTO: 7.9 %
NEUTROPHILS # BLD AUTO: 3.56 10E3/UL (ref 1.6–8.3)
NEUTROPHILS NFR BLD AUTO: 71.9 %
PLATELET # BLD AUTO: 252 10E3/UL (ref 150–450)
RBC # BLD AUTO: 4.29 10E6/UL (ref 3.8–5.2)
WBC # BLD AUTO: 4.95 10E3/UL (ref 4–11)

## 2025-08-17 ENCOUNTER — MYC REFILL (OUTPATIENT)
Dept: NEUROLOGY | Facility: CLINIC | Age: 35
End: 2025-08-17
Payer: COMMERCIAL

## 2025-08-17 DIAGNOSIS — R53.82 CHRONIC FATIGUE: ICD-10-CM

## 2025-08-17 DIAGNOSIS — G35 MULTIPLE SCLEROSIS (H): ICD-10-CM

## 2025-08-18 RX ORDER — DEXTROAMPHETAMINE SACCHARATE, AMPHETAMINE ASPARTATE, DEXTROAMPHETAMINE SULFATE AND AMPHETAMINE SULFATE 2.5; 2.5; 2.5; 2.5 MG/1; MG/1; MG/1; MG/1
10 TABLET ORAL 3 TIMES DAILY
Qty: 90 TABLET | Refills: 0 | Status: SHIPPED | OUTPATIENT
Start: 2025-08-18

## (undated) DEVICE — SUTURE VICRYL+ 0 27IN CT-1 UND VCP260H

## (undated) DEVICE — ESU CORD MONOPOLAR 10'  E0510

## (undated) DEVICE — GLOVE UNDER INDICATOR PI SZ 6 LF 41660

## (undated) DEVICE — DAVINCI XI OBTURATOR BLADELESS 8MM 470359

## (undated) DEVICE — ENDO OBTURATOR ACCESS PORT BLADELESS 8X100MM IAS8-100LP

## (undated) DEVICE — PROTECTOR ARM STANDARD ONE STEP 40433 (COI)

## (undated) DEVICE — DRSG STERI STRIP 1/2X4" R1547

## (undated) DEVICE — DAVINCI HOT SHEARS TIP COVER  400180

## (undated) DEVICE — DRESSING COVERLET 2 X 3 0340

## (undated) DEVICE — STRAP CATH LEG ADJUSTABLE 0814-8200

## (undated) DEVICE — BLADE KNIFE SURG 11 371111

## (undated) DEVICE — ESU LIGASURE LAPAROSCOPIC BLUNT TIP SEALER 5MMX37CM LF1837

## (undated) DEVICE — DAVINCI XI DRAPE ARM 470015

## (undated) DEVICE — MAT FLOOR SURGICAL 40X38 0702140238

## (undated) DEVICE — TUBING CONMED AIRSEAL SMOKE EVAC INSUFFLATION ASM-EVAC

## (undated) DEVICE — SOL WATER IRRIG 1000ML BOTTLE 2F7114

## (undated) DEVICE — BLADE KNIFE SURG 15 371115

## (undated) DEVICE — LUBRICANT INST ELECTROLUBE EL101

## (undated) DEVICE — SYRINGE 10ML FILL SALINE FLUSH STERILE 306553

## (undated) DEVICE — DRAPE U SPLIT 74X120" 29440

## (undated) DEVICE — SOLUTION IV 2B0304X STRL WATER 1000ML

## (undated) DEVICE — ENDO TROCAR SLEEVE KII ADV FIXATION 05X100MM CFS02

## (undated) DEVICE — NDL INSUFFLATION 13GA 120MM C2201

## (undated) DEVICE — CUSTOM PACK LAP CHOLE SBA5BLCHEA

## (undated) DEVICE — PREP CHLORAPREP 26ML TINTED HI-LITE ORANGE 930815

## (undated) DEVICE — SYSTEM LAPAROVUE VISIBILITY LAPVUE10

## (undated) DEVICE — PLATE GROUNDING ADULT W/CORD 9165L

## (undated) DEVICE — GLOVE SURG PI ULTRA TOUCH M SZ 7 LF 42670

## (undated) DEVICE — CATH FOLEY 16FR 5ML LUBRICATH LATEX 0165L16

## (undated) DEVICE — GLOVE SURG PI ULTRA TOUCH M SZ 6-1/2 LF

## (undated) DEVICE — DAVINCI XI SEAL UNIVERSAL 5-8MM 470361

## (undated) DEVICE — SUCTION STRYKERFLOW II 250-070-500

## (undated) DEVICE — PREP SCRUB SOL EXIDINE 4% CHG 4OZ 29002-404

## (undated) DEVICE — PROTECTOR ARM STANDARD ONE STEP

## (undated) DEVICE — DAVINCI XI HANDPIECE ESU VESSEL SEALER 8MM EXT 480422

## (undated) DEVICE — SUTURE ENDO SURGITIE 21 POLYSORB EL23LN

## (undated) DEVICE — ENDO TROCAR FIRST ENTRY KII FIOS Z-THRD 11X100MM CTF33

## (undated) DEVICE — STPL SKIN SUBCUTICULAR INSORB  2030

## (undated) DEVICE — ENDO POUCH UNIV RETRIEVAL SYSTEM INZII 10MM CD001

## (undated) DEVICE — SU MONOCRYL+ 4-0 18IN PS2 UND MCP496G

## (undated) DEVICE — PAD POS XL 1X20X40IN PINK PIGAZZI

## (undated) DEVICE — TUBING LAP SUCT/IRRIG STRYKER 250070500

## (undated) DEVICE — TROCAR ADV FIXATION NONBLADED 5X100MM

## (undated) DEVICE — DAVINCI XI DRAPE COLUMN 470341

## (undated) DEVICE — SUCTION MANIFOLD NEPTUNE 2 SYS 1 PORT 702-025-000

## (undated) DEVICE — SU WND CLOSURE VLOC 180 ABS 0 12" GS-21 VLOCL0316

## (undated) DEVICE — CUSTOM PACK DA VINCI GYN SMA5BDVHEA

## (undated) DEVICE — TUBING SMOKE EVAC PNEUMOCLEAR HIGH FLOW 0620050250

## (undated) DEVICE — SU VICRYL+ 0 27 UR6 VLT VCP603H

## (undated) DEVICE — ENDO SHEARS RENEW LAP ENDOCUT SCISSOR TIP 16.5MM 3142

## (undated) DEVICE — TUBING IRRIG TUR Y TYPE 96" LF 6543-01

## (undated) DEVICE — ENDO TROCAR FIRST ENTRY KII FIOS Z-THRD 05X100MM CTF03

## (undated) DEVICE — BRIEF STRETCH XL MPS40

## (undated) DEVICE — GLOVE BIOGEL PI ULTRATOUCH G SZ 6.0 42160

## (undated) DEVICE — SYR 50ML SLIP TIP W/O NDL 309654

## (undated) DEVICE — SU DERMABOND ADVANCED .7ML DNX12

## (undated) DEVICE — DRAPE SHEET TABLE COVER KC 42301*

## (undated) DEVICE — DAVINCI OBTURATOR 8MM BLADELESS 420023

## (undated) DEVICE — SOL RINGERS LACTATED 1000ML BAG 2B2324X

## (undated) DEVICE — DECANTER VIAL 2006S

## (undated) RX ORDER — LIDOCAINE HYDROCHLORIDE 10 MG/ML
INJECTION, SOLUTION EPIDURAL; INFILTRATION; INTRACAUDAL; PERINEURAL
Status: DISPENSED
Start: 2025-01-25

## (undated) RX ORDER — DEXAMETHASONE SODIUM PHOSPHATE 10 MG/ML
INJECTION, EMULSION INTRAMUSCULAR; INTRAVENOUS
Status: DISPENSED
Start: 2022-10-31

## (undated) RX ORDER — ONDANSETRON 2 MG/ML
INJECTION INTRAMUSCULAR; INTRAVENOUS
Status: DISPENSED
Start: 2025-01-25

## (undated) RX ORDER — GLYCOPYRROLATE 0.2 MG/ML
INJECTION, SOLUTION INTRAMUSCULAR; INTRAVENOUS
Status: DISPENSED
Start: 2025-01-25

## (undated) RX ORDER — FENTANYL CITRATE 50 UG/ML
INJECTION, SOLUTION INTRAMUSCULAR; INTRAVENOUS
Status: DISPENSED
Start: 2022-10-31

## (undated) RX ORDER — GLYCOPYRROLATE 0.2 MG/ML
INJECTION INTRAMUSCULAR; INTRAVENOUS
Status: DISPENSED
Start: 2022-10-31

## (undated) RX ORDER — LIDOCAINE HYDROCHLORIDE 10 MG/ML
INJECTION, SOLUTION EPIDURAL; INFILTRATION; INTRACAUDAL; PERINEURAL
Status: DISPENSED
Start: 2022-10-31

## (undated) RX ORDER — PROPOFOL 10 MG/ML
INJECTION, EMULSION INTRAVENOUS
Status: DISPENSED
Start: 2025-01-25

## (undated) RX ORDER — FENTANYL CITRATE-0.9 % NACL/PF 10 MCG/ML
PLASTIC BAG, INJECTION (ML) INTRAVENOUS
Status: DISPENSED
Start: 2022-10-31

## (undated) RX ORDER — ONDANSETRON 2 MG/ML
INJECTION INTRAMUSCULAR; INTRAVENOUS
Status: DISPENSED
Start: 2022-10-31

## (undated) RX ORDER — FENTANYL CITRATE 50 UG/ML
INJECTION, SOLUTION INTRAMUSCULAR; INTRAVENOUS
Status: DISPENSED
Start: 2025-01-25

## (undated) RX ORDER — DEXAMETHASONE SODIUM PHOSPHATE 10 MG/ML
INJECTION, SOLUTION INTRAMUSCULAR; INTRAVENOUS
Status: DISPENSED
Start: 2025-01-25

## (undated) RX ORDER — PROPOFOL 10 MG/ML
INJECTION, EMULSION INTRAVENOUS
Status: DISPENSED
Start: 2022-10-31